# Patient Record
Sex: FEMALE | Race: WHITE | Employment: FULL TIME | URBAN - METROPOLITAN AREA
[De-identification: names, ages, dates, MRNs, and addresses within clinical notes are randomized per-mention and may not be internally consistent; named-entity substitution may affect disease eponyms.]

---

## 2017-02-23 ENCOUNTER — OFFICE VISIT (OUTPATIENT)
Dept: FAMILY MEDICINE | Facility: CLINIC | Age: 22
End: 2017-02-23
Payer: COMMERCIAL

## 2017-02-23 VITALS
BODY MASS INDEX: 34.17 KG/M2 | DIASTOLIC BLOOD PRESSURE: 70 MMHG | TEMPERATURE: 98.9 F | WEIGHT: 181 LBS | HEART RATE: 67 BPM | SYSTOLIC BLOOD PRESSURE: 110 MMHG | HEIGHT: 61 IN | OXYGEN SATURATION: 100 %

## 2017-02-23 DIAGNOSIS — B37.2 YEAST INFECTION OF THE SKIN: ICD-10-CM

## 2017-02-23 DIAGNOSIS — F32.0 MAJOR DEPRESSIVE DISORDER, SINGLE EPISODE, MILD (H): ICD-10-CM

## 2017-02-23 DIAGNOSIS — Z00.00 ROUTINE GENERAL MEDICAL EXAMINATION AT A HEALTH CARE FACILITY: Primary | ICD-10-CM

## 2017-02-23 DIAGNOSIS — E28.2 PCOS (POLYCYSTIC OVARIAN SYNDROME): ICD-10-CM

## 2017-02-23 LAB
ERYTHROCYTE [DISTWIDTH] IN BLOOD BY AUTOMATED COUNT: 12 % (ref 10–15)
HBA1C MFR BLD: 5.1 % (ref 4.3–6)
HCT VFR BLD AUTO: 42 % (ref 35–47)
HGB BLD-MCNC: 13.9 G/DL (ref 11.7–15.7)
MCH RBC QN AUTO: 32 PG (ref 26.5–33)
MCHC RBC AUTO-ENTMCNC: 33.1 G/DL (ref 31.5–36.5)
MCV RBC AUTO: 97 FL (ref 78–100)
PLATELET # BLD AUTO: 293 10E9/L (ref 150–450)
RBC # BLD AUTO: 4.35 10E12/L (ref 3.8–5.2)
WBC # BLD AUTO: 9 10E9/L (ref 4–11)

## 2017-02-23 PROCEDURE — 80053 COMPREHEN METABOLIC PANEL: CPT | Performed by: FAMILY MEDICINE

## 2017-02-23 PROCEDURE — 84443 ASSAY THYROID STIM HORMONE: CPT | Performed by: FAMILY MEDICINE

## 2017-02-23 PROCEDURE — 85027 COMPLETE CBC AUTOMATED: CPT | Performed by: FAMILY MEDICINE

## 2017-02-23 PROCEDURE — 36415 COLL VENOUS BLD VENIPUNCTURE: CPT | Performed by: FAMILY MEDICINE

## 2017-02-23 PROCEDURE — 83036 HEMOGLOBIN GLYCOSYLATED A1C: CPT | Performed by: FAMILY MEDICINE

## 2017-02-23 PROCEDURE — 99395 PREV VISIT EST AGE 18-39: CPT | Performed by: FAMILY MEDICINE

## 2017-02-23 RX ORDER — LEVONORGESTREL AND ETHINYL ESTRADIOL 0.15-0.03
1 KIT ORAL DAILY
Qty: 91 TABLET | Refills: 3 | Status: SHIPPED | OUTPATIENT
Start: 2017-02-23 | End: 2017-05-09

## 2017-02-23 RX ORDER — NYSTATIN 100000 U/G
CREAM TOPICAL 3 TIMES DAILY
Qty: 30 G | Refills: 1 | Status: SHIPPED | OUTPATIENT
Start: 2017-02-23 | End: 2017-03-09

## 2017-02-23 RX ORDER — LEVONORGESTREL AND ETHINYL ESTRADIOL 0.15-0.03
1 KIT ORAL DAILY
Qty: 91 TABLET | Refills: 3 | Status: SHIPPED | OUTPATIENT
Start: 2017-02-23 | End: 2017-02-23

## 2017-02-23 NOTE — NURSING NOTE
"Chief Complaint   Patient presents with     Physical       Initial /70  Pulse 67  Temp 98.9  F (37.2  C) (Oral)  Ht 5' 1\" (1.549 m)  Wt 181 lb (82.1 kg)  LMP 02/18/2017 (Approximate)  SpO2 100%  BMI 34.2 kg/m2 Estimated body mass index is 34.2 kg/(m^2) as calculated from the following:    Height as of this encounter: 5' 1\" (1.549 m).    Weight as of this encounter: 181 lb (82.1 kg).  Medication Reconciliation: complete   Margaret Francisco Medical Assistant      "

## 2017-02-23 NOTE — LETTER
My Depression Action Plan  Name: Rayray Contreras   Date of Birth 1995  Date: 2/23/2017    My doctor: Weiler, Karen   My clinic: [facility] [place]  [address]  [place] [address] [phone]          GREEN    ZONE   Good Control    What it looks like:     Things are going generally well. You have normal up s and down s. You may even feel depressed from time to time, but bad moods usually last less than a day.   What you need to do:  1. Continue to care for yourself (see self care plan)  2. Check your depression survival kit and update it as needed  3. Follow your physician s recommendations including any medication.  4. Do not stop taking medication unless you consult with your physician first.           YELLOW         ZONE Getting Worse    What it looks like:     Depression is starting to interfere with your life.     It may be hard to get out of bed; you may be starting to isolate yourself from others.    Symptoms of depression are starting to last most all day and this has happened for several days.     You may have suicidal thoughts but they are not constant.   What you need to do:     1. Call your care team, your response to treatment will improve if you keep your care team informed of your progress. Yellow periods are signs an adjustment may need to be made.     2. Continue your self-care, even if you have to fake it!    3. Talk to someone in your support network    4. Open up your depression survival kit           RED    ZONE Medical Alert - Get Help    What it looks like:     Depression is seriously interfering with your life.     You may experience these or other symptoms: You can t get out of bed most days, can t work or engage in other necessary activities, you have trouble taking care of basic hygiene, or basic responsibilities, thoughts of suicide or death that will not go away, self-injurious behavior.     What you need to do:  1. Call your care team and request a  same-day appointment. If they are not available (weekends or after hours) call your local crisis line, emergency room or 911.      Electronically signed by: Margaret Francisco, February 23, 2017    Depression Self Care Plan / Survival Kit    Self-Care for Depression  Here s the deal. Your body and mind are really not as separate as most people think.  What you do and think affects how you feel and how you feel influences what you do and think. This means if you do things that people who feel good do, it will help you feel better.  Sometimes this is all it takes.  There is also a place for medication and therapy depending on how severe your depression is, so be sure to consult with your medical provider and/ or Behavioral Health Consultant if your symptoms are worsening or not improving.     In order to better manage my stress, I will:    Exercise  Get some form of exercise, every day. This will help reduce pain and release endorphins, the  feel good  chemicals in your brain. This is almost as good as taking antidepressants!  This is not the same as joining a gym and then never going! (they count on that by the way ) It can be as simple as just going for a walk or doing some gardening, anything that will get you moving.      Hygiene   Maintain good hygiene (Get out of bed in the morning, Make your bed, Brush your teeth, Take a shower, and Get dressed like you were going to work, even if you are unemployed).  If your clothes don't fit try to get ones that do.    Diet  I will strive to eat foods that are good for me, drink plenty of water, and avoid excessive sugar, caffeine, alcohol, and other mood-altering substances.  Some foods that are helpful in depression are: complex carbohydrates, B vitamins, flaxseed, fish or fish oil, fresh fruits and vegetables.    Psychotherapy  I agree to participate in Individual Therapy (if recommended).    Medication  If prescribed medications, I agree to take them.  Missing doses can result  in serious side effects.  I understand that drinking alcohol, or other illicit drug use, may cause potential side effects.  I will not stop my medication abruptly without first discussing it with my provider.    Staying Connected With Others  I will stay in touch with my friends, family members, and my primary care provider/team.    Use your imagination  Be creative.  We all have a creative side; it doesn t matter if it s oil painting, sand castles, or mud pies! This will also kick up the endorphins.    Witness Beauty  (AKA stop and smell the roses) Take a look outside, even in mid-winter. Notice colors, textures. Watch the squirrels and birds.     Service to others  Be of service to others.  There is always someone else in need.  By helping others we can  get out of ourselves  and remember the really important things.  This also provides opportunities for practicing all the other parts of the program.    Humor  Laugh and be silly!  Adjust your TV habits for less news and crime-drama and more comedy.    Control your stress  Try breathing deep, massage therapy, biofeedback, and meditation. Find time to relax each day.     My support system    Clinic Contact:  Phone number:    Contact 1:  Phone number:    Contact 2:  Phone number:    Druze/:  Phone number:    Therapist:  Phone number:    Local crisis center:    Phone number:    Other community support:  Phone number:

## 2017-02-23 NOTE — MR AVS SNAPSHOT
After Visit Summary   2/23/2017    Rayray Contreras    MRN: [id number]           Patient Information     Date Of Birth          1995        Visit Information        Provider Department      2/23/2017 2:20 PM Weiler, Karen, MD [facility] [place]        Today's Diagnoses     Routine general medical examination at a health care facility    -  1    PCOS (polycystic ovarian syndrome)        Major depressive disorder, single episode, mild (H)        Yeast infection of the skin           Follow-ups after your visit        Who to contact     If you have questions or need follow up information about today's clinic visit or your schedule please contact [facility] [place] directly at [phone].  Normal or non-critical lab and imaging results will be communicated to you by MyChart, letter or phone within 4 business days after the clinic has received the results. If you do not hear from us within 7 days, please contact the clinic through [email or web address] or phone. If you have a critical or abnormal lab result, we will notify you by phone as soon as possible.  Submit refill requests through [employer] or call your pharmacy and they will forward the refill request to us. Please allow 3 business days for your refill to be completed.          Additional Information About Your Visit        MyChart Information     [employer] gives you secure access to your electronic health record. If you see a primary care provider, you can also send messages to your care team and make appointments. If you have questions, please call your primary care clinic.  If you do not have a primary care provider, please call [phone] and they will assist you.        Care EveryWhere ID     This is your Care EveryWhere ID. This could be used by other organizations to access your [place] medical records  [id number]        Your Vitals Were     Pulse Temperature Height Last Period Pulse Oximetry BMI (Body Mass Index)    67 98.9  F (37.2  C)  "(Oral) 5' 1\" (1.549 m) 02/18/2017 (Approximate) 100% 34.2 kg/m2       Blood Pressure from Last 3 Encounters:   02/23/17 110/70   12/29/16 129/86   08/15/16 112/79    Weight from Last 3 Encounters:   02/23/17 181 lb (82.1 kg)   12/29/16 180 lb (81.6 kg)   08/15/16 184 lb (83.5 kg)              We Performed the Following     CBC with platelets     Comprehensive metabolic panel (BMP + Alb, Alk Phos, ALT, AST, Total. Bili, TP)     DEPRESSION ACTION PLAN (DAP)     Hemoglobin A1c     TSH with free T4 reflex          Today's Medication Changes          These changes are accurate as of: 2/23/17 11:59 PM.  If you have any questions, ask your nurse or doctor.               Start taking these medicines.        Dose/Directions    levonorgestrel-ethinyl estradiol 0.15-0.03 MG per tablet   Commonly known as:  SEASONALE   Used for:  PCOS (polycystic ovarian syndrome)   Started by:  Weiler, Karen, MD        Dose:  1 tablet   Take 1 tablet by mouth daily Must make appointment for future refills.   Quantity:  91 tablet   Refills:  3       nystatin cream   Commonly known as:  MYCOSTATIN   Used for:  Yeast infection of the skin   Started by:  Weiler, Karen, MD        Apply topically 3 times daily for 14 days   Quantity:  30 g   Refills:  1            Where to get your medicines      [facility] [address] [place] [address] AT [facility] [address] [address], [facility] [address]    Hours:  24-hours Phone:  [phone]     levonorgestrel-ethinyl estradiol 0.15-0.03 MG per tablet    nystatin cream                Primary Care Provider Office Phone # Fax #    Karen Weiler, [phone]       [facility] [address]  [place] [address]        Thank you!     Thank you for choosing [facility]  for your care. Our goal is always to provide you with excellent care. Hearing back from our patients is one way we can continue to " improve our services. Please take a few minutes to complete the written survey that you may receive in the mail after your visit with us. Thank you!             Your Updated Medication List - Protect others around you: Learn how to safely use, store and throw away your medicines at www.disposemymeds.org.          This list is accurate as of: 2/23/17 11:59 PM.  Always use your most recent med list.                   Brand Name Dispense Instructions for use    levonorgestrel-ethinyl estradiol 0.15-0.03 MG per tablet    SEASONALE    91 tablet    Take 1 tablet by mouth daily Must make appointment for future refills.       nystatin cream    MYCOSTATIN    30 g    Apply topically 3 times daily for 14 days

## 2017-02-23 NOTE — PROGRESS NOTES
SUBJECTIVE:     CC: Rayray Contreras is an 21 year old woman who presents for preventive health visit.     Physical   Annual:     Getting at least 3 servings of Calcium per day::  NO    Bi-annual eye exam::  Yes    Dental care twice a year::  NO    Sleep apnea or symptoms of sleep apnea::  None    Diet::  Regular (no restrictions)    Frequency of exercise::  None    Taking medications regularly::  Yes    Medication side effects::  None    Additional concerns today::  No      Would like to potentially switch her birth control.  Feels like it is causing some hair growth. Periods have been regular. Periods are not heavy.  No cramping. Has never been sexually active.  Has a boyfriend. Anticipate that she may become sexually active.    Feels like her depression is good. Has not taken Wellbutrin in a while. No SI/HI. Is trying to journal.  Is living at home.     No CP, or SOB.       Today's PHQ-2 Score: Answers for HPI/ROS submitted by the patient on 2/20/2017   PHQ-2 Depressed: Not at all, Not at all  PHQ-2 Score: 0    PHQ-2 ( 1999 Pfizer) 2/20/2017   Q1: Little interest or pleasure in doing things -   Q2: Feeling down, depressed or hopeless -   PHQ-2 Score -   Little interest or pleasure in doing things Not at all   Feeling down, depressed or hopeless Not at all   PHQ-2 Score 0       Abuse: Current or Past(Physical, Sexual or Emotional)- No  Do you feel safe in your environment - Yes    Social History   Substance Use Topics     Smoking status: Never Smoker     Smokeless tobacco: Never Used     Alcohol use No      Comment: once every few months      1 per month     Recent Labs   Lab Test  12/18/14   1148  01/15/11   1030   CHOL  146  172   HDL  55  49*   LDL  80  88   TRIG  53  178*   CHOLHDLRATIO  2.7  3.6       Reviewed orders with patient.  Reviewed health maintenance and updated orders accordingly - Yes    Mammo Decision Support:  Mammogram not appropriate for this patient based on age.    Pertinent mammograms  "are reviewed under the imaging tab.  History of abnormal Pap smear: NO - age 21-29 PAP every 3 years recommended  All Histories reviewed and updated in Epic.  ROS:  C: NEGATIVE for fever, chills, change in weight  I: NEGATIVE for worrisome rashes, moles or lesions  E: NEGATIVE for vision changes or irritation  ENT: NEGATIVE for ear, mouth and throat problems  R: NEGATIVE for significant cough or SOB  B: NEGATIVE for masses, tenderness or discharge  CV: NEGATIVE for chest pain, palpitations or peripheral edema  GI: NEGATIVE for nausea, abdominal pain, heartburn, or change in bowel habits  : NEGATIVE for unusual urinary or vaginal symptoms. Periods are regular.  M: NEGATIVE for significant arthralgias or myalgia  N: NEGATIVE for weakness, dizziness or paresthesias  P: NEGATIVE for changes in mood or affect    Problem list, Medication list, Allergies, and Medical/Social/Surgical histories reviewed in [facility] and updated as appropriate.  OBJECTIVE:     /70  Pulse 67  Temp 98.9  F (37.2  C) (Oral)  Ht 5' 1\" (1.549 m)  Wt 181 lb (82.1 kg)  LMP 02/18/2017 (Approximate)  SpO2 100%  BMI 34.2 kg/m2  EXAM:  GENERAL: healthy, alert and no distress  NECK: no adenopathy, no asymmetry, masses, or scars and thyroid normal to palpation  RESP: lungs clear to auscultation - no rales, rhonchi or wheezes  BREAST: normal without masses, tenderness or nipple discharge and no palpable axillary masses or adenopathy  CV: regular rate and rhythm, normal S1 S2, no S3 or S4, no murmur, click or rub, no peripheral edema and peripheral pulses strong  ABDOMEN: soft, nontender, no hepatosplenomegaly, no masses and bowel sounds normal  MS: no gross musculoskeletal defects noted, no edema    ASSESSMENT/PLAN:         ICD-10-CM    1. Routine general medical examination at a health care facility Z00.00 Doing well   2. PCOS (polycystic ovarian syndrome) E28.2 TSH with free T4 reflex   On OCP's,  Asymptomatic. Will check labs.   " "levonorgestrel-ethinyl estradiol (SEASONALE) 0.15-0.03 MG per tablet     CBC with platelets     Comprehensive metabolic panel (BMP + Alb, Alk Phos, ALT, AST, Total. Bili, TP)     Hemoglobin A1c     DISCONTINUED: levonorgestrel-ethinyl estradiol (SEASONALE) 0.15-0.03 MG per tablet   3. Major depressive disorder, single episode, mild (H) F32.0 Doing well.  Patient does not feel like she needs to be on medication at this time.    4. Yeast infection of the skin B37.2 nystatin (MYCOSTATIN) cream       COUNSELING:  Reviewed preventive health counseling, as reflected in patient instructions       Regular exercise       Healthy diet/nutrition         reports that she has never smoked. She has never used smokeless tobacco.    Estimated body mass index is 34.2 kg/(m^2) as calculated from the following:    Height as of this encounter: 5' 1\" (1.549 m).    Weight as of this encounter: 181 lb (82.1 kg).   Weight management plan: Discussed healthy diet and exercise guidelines and patient will follow up in 12 months in clinic to re-evaluate.    Counseling Resources:  ATP IV Guidelines  Pooled Cohorts Equation Calculator  Breast Cancer Risk Calculator  FRAX Risk Assessment  ICSI Preventive Guidelines  Dietary Guidelines for Americans, 2010  USDA's MyPlate  ASA Prophylaxis  Lung CA Screening    Karen Weiler, MD  [facility]  "

## 2017-02-24 LAB
ALBUMIN SERPL-MCNC: 4.3 G/DL (ref 3.4–5)
ALP SERPL-CCNC: 45 U/L (ref 40–150)
ALT SERPL W P-5'-P-CCNC: 15 U/L (ref 0–50)
ANION GAP SERPL CALCULATED.3IONS-SCNC: 5 MMOL/L (ref 3–14)
AST SERPL W P-5'-P-CCNC: 7 U/L (ref 0–45)
BILIRUB SERPL-MCNC: 0.4 MG/DL (ref 0.2–1.3)
BUN SERPL-MCNC: 12 MG/DL (ref 7–30)
CALCIUM SERPL-MCNC: 9.5 MG/DL (ref 8.5–10.1)
CHLORIDE SERPL-SCNC: 106 MMOL/L (ref 94–109)
CO2 SERPL-SCNC: 29 MMOL/L (ref 20–32)
CREAT SERPL-MCNC: 0.82 MG/DL (ref 0.52–1.04)
GFR SERPL CREATININE-BSD FRML MDRD: 86 ML/MIN/1.7M2
GLUCOSE SERPL-MCNC: 60 MG/DL (ref 70–99)
POTASSIUM SERPL-SCNC: 3.9 MMOL/L (ref 3.4–5.3)
PROT SERPL-MCNC: 8.5 G/DL (ref 6.8–8.8)
SODIUM SERPL-SCNC: 140 MMOL/L (ref 133–144)
TSH SERPL DL<=0.005 MIU/L-ACNC: 0.68 MU/L (ref 0.4–4)

## 2017-04-06 ENCOUNTER — TELEPHONE (OUTPATIENT)
Dept: FAMILY MEDICINE | Facility: CLINIC | Age: 22
End: 2017-04-06

## 2017-05-09 DIAGNOSIS — E28.2 PCOS (POLYCYSTIC OVARIAN SYNDROME): ICD-10-CM

## 2017-05-09 RX ORDER — LEVONORGESTREL AND ETHINYL ESTRADIOL 0.15-0.03
1 KIT ORAL DAILY
Qty: 91 TABLET | Refills: 2 | Status: SHIPPED | OUTPATIENT
Start: 2017-05-09 | End: 2018-03-14

## 2017-05-09 NOTE — TELEPHONE ENCOUNTER
Prescription approved per [facility] Refill Protocol.  Sophie Lugo, RN, BSN  [facility]

## 2017-05-09 NOTE — TELEPHONE ENCOUNTER
Name of caller: Rayray  Relationship to Patient: self    Reason for Call:  Med refill-BC medication    Best phone number to reach pt at is: [phone]  Ok to leave a message with medical info? yes    Pharmacy preferred (if calling for a refill): [facility] [place]

## 2017-05-09 NOTE — TELEPHONE ENCOUNTER
levonorgestrel-ethinyl estradiol (SEASONALE) 0.15-0.03 MG per tablet  New Pharmacy  Last Written Prescription Date: 2/23/2017  Last Fill Quantity: 91 tablet,  # refills: 3   Last Office Visit with FMCLAUDIA, ROSAP or [facility] prescribing provider: 2/23/2017

## 2017-06-26 ENCOUNTER — OFFICE VISIT (OUTPATIENT)
Dept: FAMILY MEDICINE | Facility: CLINIC | Age: 22
End: 2017-06-26
Payer: COMMERCIAL

## 2017-06-26 VITALS
OXYGEN SATURATION: 100 % | HEIGHT: 61 IN | SYSTOLIC BLOOD PRESSURE: 130 MMHG | HEART RATE: 78 BPM | DIASTOLIC BLOOD PRESSURE: 70 MMHG | WEIGHT: 180.7 LBS | TEMPERATURE: 98.1 F | BODY MASS INDEX: 34.11 KG/M2

## 2017-06-26 DIAGNOSIS — M79.641 PAIN OF RIGHT HAND: ICD-10-CM

## 2017-06-26 DIAGNOSIS — R20.0 NUMBNESS OF RIGHT HAND: Primary | ICD-10-CM

## 2017-06-26 PROCEDURE — 99213 OFFICE O/P EST LOW 20 MIN: CPT | Performed by: PHYSICIAN ASSISTANT

## 2017-06-26 NOTE — MR AVS SNAPSHOT
After Visit Summary   6/26/2017    Rayray Contreras    MRN: [id number]           Patient Information     Date Of Birth          1995        Visit Information        Provider Department      6/26/2017 1:40 PM Melanie Parra PA-C [facility]        Today's Diagnoses     Numbness of right hand    -  1      Care Instructions      AOLIKES Wrist Sprains Support Hand Brace Carpal Tunnel Steel Splint-Arthritis,Right Hand color  Available at [facility] also has braces  Carpal Tunnel Syndrome    Carpal tunnel syndrome is a painful condition of the wrist and arm. It is caused by pressure on the median nerve.  The median nerve is one of the nerves that give feeling and movement to the hand. It passes through a tunnel in the wrist called the carpal tunnel. This tunnel is made up of bones and ligaments. Narrowing of this tunnel or swelling of the tissues inside the tunnel puts pressure on the median nerve. This causes numbness, pins and needles, or electric shooting pains in your hand and forearm. Often the pain is worse at night and may wake you when you are asleep.  Carpal tunnel syndrome may occur during pregnancy and with use of birth control pills. It is more common in workers who must often bend their wrists. It is also common in people who work with power tools that cause strong vibrations.  Home care    Rest the painful wrist. Avoid repeated bending of the wrist back and forth. This puts pressure on the median nerve. Avoid using power tools with strong vibrations.    If you were given a splint, wear it at night while you sleep. You may also wear it during the day for comfort.    Move your fingers and wrists often to avoid stiffness.    Elevate your arms on pillows when you lie down.    Try using the unaffected hand more.    Try not to hold your wrists in a bent, downward position.    Sometimes changes in the work place may ease symptoms. If you type most of the day, it may help to  change the position of your keyboard or add a wrist support. Your wrist should be in a neutral position and not bent back when typing.    You may use over-the-counter pain medicine to treat pain and inflammation, unless another medicine was prescribed. Anti-inflammatory pain medicines, such as ibuprofen or naproxen may be more effective than acetaminophen, which treats pain, but not inflammation. If you have chronic liver or kidney disease or ever had a stomach ulcer or GI bleeding, talk with your doctor before using these medicines.    Opioid pain medicine will only give temporary relief and does not treat the problem. If pain continues, you may need a shot of a steroid drug into your wrist.    If the above methods fail, you may need surgery. This will open the carpal tunnel and release the pressure on the trapped nerve.  Follow-up care  Follow up with your healthcare provider, or as advised, if the pain doesn t begin to improve within the next week.  If X-rays were taken, you will be notified of any new findings that may affect your care.  When to seek medical advice  Call your healthcare provider right away if any of these occur:    Pain not improving with the above treatment    Fingers or hand become cold, blue, numb, or tingly    Your whole arm becomes swollen or weak  Date Last Reviewed: 11/23/2015 2000-2017 [employer]. [address] [place] [address], [place] [id number]. All rights reserved. This information is not intended as a substitute for professional medical care. Always follow your healthcare professional's instructions.                Follow-ups after your visit        Additional Services     ORTHO  REFERRAL       [facility] is referring you to the Orthopedic  Services at [facility].       The  Representative will assist you in the coordination of your Orthopedic and Musculoskeletal Care as prescribed by your physician.    The  [facility] Representative will call you within 1 business day to help schedule your appointment, or you may contact the  Representative at:    All areas ~ [phone]     Type of Referral : Non Surgical       Timeframe requested: Within 2 weeks    MAY NEED EMG/NERVE CONDUCTION STUDY TO ASSESS FOR CARPAL TUNNEL    Coverage of these services is subject to the terms and limitations of your health insurance plan.  Please call member services at your health plan with any benefit or coverage questions.      If X-rays, CT or MRI's have been performed, please contact the facility where they were done to arrange for , prior to your scheduled appointment.  Please bring this referral request to your appointment and present it to your specialist.                  Who to contact     If you have questions or need follow up information about today's clinic visit or your schedule please contact [facility] directly at [phone].  Normal or non-critical lab and imaging results will be communicated to you by MyChart, letter or phone within 4 business days after the clinic has received the results. If you do not hear from us within 7 days, please contact the clinic through [employer] or phone. If you have a critical or abnormal lab result, we will notify you by phone as soon as possible.  Submit refill requests through [employer] or call your pharmacy and they will forward the refill request to us. Please allow 3 business days for your refill to be completed.          Additional Information About Your Visit        [employer] Information     [employer] gives you secure access to your electronic health record. If you see a primary care provider, you can also send messages to your care team and make appointments. If you have questions, please call your primary care clinic.  If you do not have a primary care provider, please call [phone] and they will assist you.        Care EveryWhere ID     This is your Care  "EveryWhere ID. This could be used by other organizations to access your [facility] medical records  [id number]        Your Vitals Were     Pulse Temperature Height Last Period Pulse Oximetry Breastfeeding?    78 98.1  F (36.7  C) (Tympanic) 5' 1\" (1.549 m) 05/21/2017 100% No    BMI (Body Mass Index)                   34.14 kg/m2            Blood Pressure from Last 3 Encounters:   06/26/17 130/70   02/23/17 110/70   12/29/16 129/86    Weight from Last 3 Encounters:   06/26/17 180 lb 11.2 oz (82 kg)   02/23/17 181 lb (82.1 kg)   12/29/16 180 lb (81.6 kg)              We Performed the Following     ORTHO  REFERRAL        Primary Care Provider Office Phone # Fax #    Karen Weiler, [phone]       [facility] [address] [facility] [address]        Equal Access to Services     GONZALO BAUTISTA : Hadii aad ku hadasho Soomaali, waaxda luqadaha, qaybta kaalmada adeegyada, waxay idiin hayasiyan bessy [place] . So [facility] [phone].    ATENCIÓN: Si habla español, tiene a acosta disposición servicios gratuitos de asistencia lingüística. Llame al [phone].    We comply with applicable federal civil rights laws and [place] laws. We do not discriminate on the basis of race, color, national origin, age, disability sex, sexual orientation or gender identity.            Thank you!     Thank you for choosing [facility]  for your care. Our goal is always to provide you with excellent care. Hearing back from our patients is one way we can continue to improve our services. Please take a few minutes to complete the written survey that you may receive in the mail after your visit with us. Thank you!             Your Updated Medication List - Protect others around you: Learn how to safely use, store and throw away your medicines at www.disposemymeds.org.          This list is accurate as of: 6/26/17  2:13 PM.  Always use your most recent med list.                   Brand Name " Dispense Instructions for use Diagnosis    levonorgestrel-ethinyl estradiol 0.15-0.03 MG per tablet    SEASONALE    91 tablet    Take 1 tablet by mouth daily Must make appointment for future refills.    PCOS (polycystic ovarian syndrome)

## 2017-06-26 NOTE — PATIENT INSTRUCTIONS
GOLDY Wrist Sprains Support Hand Brace Carpal Tunnel Steel Splint-Arthritis,Right Hand color  Available at [facility] also has braces  Carpal Tunnel Syndrome    Carpal tunnel syndrome is a painful condition of the wrist and arm. It is caused by pressure on the median nerve.  The median nerve is one of the nerves that give feeling and movement to the hand. It passes through a tunnel in the wrist called the carpal tunnel. This tunnel is made up of bones and ligaments. Narrowing of this tunnel or swelling of the tissues inside the tunnel puts pressure on the median nerve. This causes numbness, pins and needles, or electric shooting pains in your hand and forearm. Often the pain is worse at night and may wake you when you are asleep.  Carpal tunnel syndrome may occur during pregnancy and with use of birth control pills. It is more common in workers who must often bend their wrists. It is also common in people who work with power tools that cause strong vibrations.  Home care    Rest the painful wrist. Avoid repeated bending of the wrist back and forth. This puts pressure on the median nerve. Avoid using power tools with strong vibrations.    If you were given a splint, wear it at night while you sleep. You may also wear it during the day for comfort.    Move your fingers and wrists often to avoid stiffness.    Elevate your arms on pillows when you lie down.    Try using the unaffected hand more.    Try not to hold your wrists in a bent, downward position.    Sometimes changes in the work place may ease symptoms. If you type most of the day, it may help to change the position of your keyboard or add a wrist support. Your wrist should be in a neutral position and not bent back when typing.    You may use over-the-counter pain medicine to treat pain and inflammation, unless another medicine was prescribed. Anti-inflammatory pain medicines, such as ibuprofen or naproxen may be more effective than acetaminophen,  which treats pain, but not inflammation. If you have chronic liver or kidney disease or ever had a stomach ulcer or GI bleeding, talk with your doctor before using these medicines.    Opioid pain medicine will only give temporary relief and does not treat the problem. If pain continues, you may need a shot of a steroid drug into your wrist.    If the above methods fail, you may need surgery. This will open the carpal tunnel and release the pressure on the trapped nerve.  Follow-up care  Follow up with your healthcare provider, or as advised, if the pain doesn t begin to improve within the next week.  If X-rays were taken, you will be notified of any new findings that may affect your care.  When to seek medical advice  Call your healthcare provider right away if any of these occur:    Pain not improving with the above treatment    Fingers or hand become cold, blue, numb, or tingly    Your whole arm becomes swollen or weak  Date Last Reviewed: 11/23/2015 2000-2017 [employer]. [address] [place] [address], [place] [address]. All rights reserved. This information is not intended as a substitute for professional medical care. Always follow your healthcare professional's instructions.

## 2017-06-26 NOTE — PROGRESS NOTES
SUBJECTIVE:                                                    Rayray Contreras is a 22 year old female who presents to clinic today for the following health issues:    Joint Pain    Onset: Since 2009     Description:   Location: Right hand   Character: Sharp    Intensity: 8/10    Progression of Symptoms: same    Accompanying Signs & Symptoms:  Other symptoms: numbness    History:   Previous similar pain: YES      Precipitating factors:   Trauma or overuse: YES    Alleviating factors:  Improved by: nothing    Therapies Tried and outcome: Nothing     Patient reports a history of these symptoms since 2009. States symptoms used to be brought on by knitting and by playing video games  Thinks that symptoms may be getting worse  Working as a  now. Notices numbness in her R hand when she uses clippers (vibration).  Reports pain on palmar aspect of her hand, near thumb and index finger  States that pain is tolerable and does not interfere with her work. Thumb movements exacerbate the pain.  Numbness has made it difficult for her to work. Notices numbness in thenar eminence, but also indicates that digits 4 and 5 sometimes feel numb  Denies elbow pain  States that the pain is more frequent than the numbness  Doesn't feel that  strength has decreased    She is R handed    Denies history of neck problems  Denies any history of fractures or injuries of the hand/wirst.    Got a new clippers with a handle so she won't drop it.    Problem list and histories reviewed & adjusted, as indicated.  Additional history: as documented    Patient Active Problem List   Diagnosis     Developmental Delay     Muscle cramp     Obesity     PCOS (polycystic ovarian syndrome)     Major depressive disorder, single episode, mild (H)     Health Care Home     Concussion without loss of consciousness     Past Surgical History:   Procedure Laterality Date     NO HISTORY OF SURGERY         Social History   Substance Use Topics      "Smoking status: Never Smoker     Smokeless tobacco: Never Used     Alcohol use No      Comment: once every few months      Family History   Problem Relation Age of Onset     Colon Cancer Father      DIABETES Maternal Grandmother      DIABETES Maternal Grandfather          Current Outpatient Prescriptions   Medication Sig Dispense Refill     levonorgestrel-ethinyl estradiol (SEASONALE) 0.15-0.03 MG per tablet Take 1 tablet by mouth daily Must make appointment for future refills. 91 tablet 2     Allergies   Allergen Reactions     Chicken-Derived Products (Egg)      Other reaction(s): GI Upset     Lac Bovis      Other reaction(s): GI Upset       Reviewed and updated as needed this visit by clinical staff       Reviewed and updated as needed this visit by Provider         ROS:  CONSTITUTIONAL:NEGATIVE  for chills and fever  INTEGUMENTARY/SKIN: NEGATIVE for worrisome rashes, moles or lesions  MUSCULOSKELETAL: POSITIVE  for hand pain  NEURO: POSITIVE for numbness or tingling    OBJECTIVE:     /70 (BP Location: Right arm, Patient Position: Chair, Cuff Size: Adult Large)  Pulse 78  Temp 98.1  F (36.7  C) (Tympanic)  Ht 5' 1\" (1.549 m)  Wt 180 lb 11.2 oz (82 kg)  LMP 05/21/2017  SpO2 100%  Breastfeeding? No  BMI 34.14 kg/m2  Body mass index is 34.14 kg/(m^2).  GENERAL: healthy, alert and no distress  MS: No pain with wrist flexion/extension. Pain in wrist with abduction/adduction. Carpal tunnel compression test: reports numbness in index finger. Phalen's test: reports numbness in palm of hand and digits 2-4  SKIN: no suspicious lesions or rashes    Diagnostic Test Results:  none     ASSESSMENT/PLAN:     1. Numbness of right hand  Possible carpal tunnel syndrome. Reports numbness in digits 2-4, but states she also sometimes gets numbness in 5th digit. May benefit from EMG/NCS for further evaluation. Recommend wearing wrist brace at night while sleeping, as well as during the day as needed. Discussed that " physical therapy may also be advised to manage symptoms. Will refer to ortho to determine if EMG/NCS is indicated at this time; start using brace in the meantime  - ORTHO  REFERRAL    2. Pain of right hand  Does endorse pain in the thenar eminence. With concomitant numbness, suspect carpal tunnel syndrome. See above.    See Patient Instructions    Melanie Parra PA-C  [facility]

## 2017-06-26 NOTE — NURSING NOTE
"Chief Complaint   Patient presents with     Musculoskeletal Problem       Initial /70 (BP Location: Right arm, Patient Position: Chair, Cuff Size: Adult Large)  Pulse 78  Temp 98.1  F (36.7  C) (Tympanic)  Ht 5' 1\" (1.549 m)  Wt 180 lb 11.2 oz (82 kg)  LMP 05/21/2017  SpO2 100%  Breastfeeding? No  BMI 34.14 kg/m2 Estimated body mass index is 34.14 kg/(m^2) as calculated from the following:    Height as of this encounter: 5' 1\" (1.549 m).    Weight as of this encounter: 180 lb 11.2 oz (82 kg).  Medication Reconciliation: complete     Belen Davies MA     "

## 2017-07-07 ENCOUNTER — OFFICE VISIT (OUTPATIENT)
Dept: ORTHOPEDICS | Facility: CLINIC | Age: 22
End: 2017-07-07
Payer: COMMERCIAL

## 2017-07-07 VITALS
HEIGHT: 62 IN | WEIGHT: 182 LBS | SYSTOLIC BLOOD PRESSURE: 116 MMHG | BODY MASS INDEX: 33.49 KG/M2 | DIASTOLIC BLOOD PRESSURE: 84 MMHG

## 2017-07-07 DIAGNOSIS — R20.0 NUMBNESS OF RIGHT HAND: Primary | ICD-10-CM

## 2017-07-07 PROCEDURE — 99244 OFF/OP CNSLTJ NEW/EST MOD 40: CPT | Performed by: FAMILY MEDICINE

## 2017-07-07 RX ORDER — PREDNISONE 20 MG/1
20 TABLET ORAL DAILY
Qty: 5 TABLET | Refills: 0 | Status: SHIPPED | OUTPATIENT
Start: 2017-07-07 | End: 2017-12-21

## 2017-07-07 NOTE — MR AVS SNAPSHOT
After Visit Summary   7/7/2017    Rayray Contreras    MRN: [id number]           Patient Information     Date Of Birth          1995        Visit Information        Provider Department      7/7/2017 2:00 PM Madelyn Ledezma DO [facility]        Today's Diagnoses     Numbness of right hand    -  1      Care Instructions    Thank you for allowing us to participate in your care today.  Please find below your visit diagnosis and the plan going forward.    1. Numbness of right hand      Hand therapy: [facility] - [phone]  EMG ordered  Prescription Medication as directed: Prednisone. Take it in the AM and with food.  Ok to wear the brace at night / after work as needed    Follow up 5 days after the date of your EMG to discuss results and next steps. Call direct clinic number [[phone]] at any time with questions or concerns.    Madelyn Ledezma DO [facility]  Website: [email or web address]  Twitter: [email or web address]            Follow-ups after your visit        Additional Services     [facility] PT, HAND, AND CHIROPRACTIC REFERRAL       **This order will print in the [facility] Scheduling Office**    Physical Therapy, Hand Therapy and Chiropractic Care are available through:    *[facility]  *[facility]  *[facility]    Call one number to schedule at any of the above locations: [phone].    Your provider has referred you to: Hand Therapy    Indication/Reason for Referral: Carpal Tunnel  Onset of Illness: see chart  Therapy Orders: Evaluate and Treat  Special Programs: None  Special Request: None    Additional Comments for the Therapist or Chiropractor:     Please be aware that coverage of these services is subject to the terms and limitations of your health insurance plan.  Call member services at your health plan with any benefit or coverage questions.      Please  bring the following to your appointment:    *Your personal calendar for scheduling future appointments  *Comfortable clothing            NEUROLOGY ADULT REFERRAL       Your provider has referred you to:  Dr. Onesimo Loyola  [facility]  [address] [id number] [phone]  Toll Free: [phone]  Fax: [phone]    EMG Procedure/Referral: EMG Procedure Only, access for right median/ulnar neuropathy     Please be aware that coverage of these services is subject to the terms and limitations of your health insurance plan.  Call member services at your health plan with any benefit or coverage questions.      Please bring the following with you to your appointment:    (1) Any X-Rays, CTs or MRIs which have been performed.  Contact the facility where they were done to arrange for  prior to your scheduled appointment.  Any new CT, MRI or other procedures ordered by your specialist must be performed at a [facility] facility or coordinated by your clinic's referral office.  (2) List of current medications  (3) This referral request   (4) Any documents/labs given to you for this referral                  Who to contact     If you have questions or need follow up information about today's clinic visit or your schedule please contact [facility] directly at [phone].  Normal or non-critical lab and imaging results will be communicated to you by MyChart, letter or phone within 4 business days after the clinic has received the results. If you do not hear from us within 7 days, please contact the clinic through MyChart or phone. If you have a critical or abnormal lab result, we will notify you by phone as soon as possible.  Submit refill requests through [employer] or call your pharmacy and they will forward the refill request to us. Please allow 3 business days for your refill to be completed.          Additional Information About Your Visit       "  MyChart Information     [employer] gives you secure access to your electronic health record. If you see a primary care provider, you can also send messages to your care team and make appointments. If you have questions, please call your primary care clinic.  If you do not have a primary care provider, please call [phone] and they will assist you.        Care EveryWhere ID     This is your Care EveryWhere ID. This could be used by other organizations to access your [facility] medical records  [id number]        Your Vitals Were     Height Last Period BMI (Body Mass Index)             5' 1.5\" (1.562 m) 05/21/2017 33.83 kg/m2          Blood Pressure from Last 3 Encounters:   07/07/17 116/84   06/26/17 130/70   02/23/17 110/70    Weight from Last 3 Encounters:   07/07/17 182 lb (82.6 kg)   06/26/17 180 lb 11.2 oz (82 kg)   02/23/17 181 lb (82.1 kg)              We Performed the Following     [facility] PT, HAND, AND CHIROPRACTIC REFERRAL     NEUROLOGY ADULT REFERRAL          Today's Medication Changes          These changes are accurate as of: 7/7/17  2:33 PM.  If you have any questions, ask your nurse or doctor.               Start taking these medicines.        Dose/Directions    predniSONE 20 MG tablet   Commonly known as:  DELTASONE   Used for:  Numbness of right hand   Started by:  Madelyn Ledezma DO        Dose:  20 mg   Take 1 tablet (20 mg) by mouth daily   Quantity:  5 tablet   Refills:  0            Where to get your medicines      These medications were sent to [facility] [address] [facility] [employer] [facility] [address] [facility] [address]  [address] [facility] [address], [place] [address]     Phone:  [phone]     predniSONE 20 MG tablet                Primary Care Provider Office Phone # Fax #    Karen Weiler, [phone]       [facility] [address] [facility] [address]        Equal Access to Services     GONZALO BAUTISTA AH: Hadii uzair gosso Soomaali, waaxda luqadaha, qaybta kaalmada camilo burris " xiomara rodriguezcollin laNashlucía ah. So [facility] [phone].    ATENCIÓN: Si habla carlos, tiene a acosta disposición servicios gratuitos de asistencia lingüística. Flavia al [phone].    We comply with applicable federal civil rights laws and [place] laws. We do not discriminate on the basis of race, color, national origin, age, disability sex, sexual orientation or gender identity.            Thank you!     Thank you for choosing [facility]  for your care. Our goal is always to provide you with excellent care. Hearing back from our patients is one way we can continue to improve our services. Please take a few minutes to complete the written survey that you may receive in the mail after your visit with us. Thank you!             Your Updated Medication List - Protect others around you: Learn how to safely use, store and throw away your medicines at www.disposemymeds.org.          This list is accurate as of: 7/7/17  2:33 PM.  Always use your most recent med list.                   Brand Name Dispense Instructions for use Diagnosis    levonorgestrel-ethinyl estradiol 0.15-0.03 MG per tablet    SEASONALE    91 tablet    Take 1 tablet by mouth daily Must make appointment for future refills.    PCOS (polycystic ovarian syndrome)       predniSONE 20 MG tablet    DELTASONE    5 tablet    Take 1 tablet (20 mg) by mouth daily    Numbness of right hand

## 2017-07-07 NOTE — LETTER
7/7/2017         RE: Rayray Contreras  [address] [facility] [address] [place] [id number]        Dear Colleague,    Thank you for referring your patient, Rayray Contreras, to the [facility]. Please see a copy of my visit note below.    ASSESSMENT & PLAN    ICD-10-CM    1. Numbness of right hand R20.0 NEUROLOGY ADULT REFERRAL     [facility], HAND, AND CHIROPRACTIC REFERRAL     predniSONE (DELTASONE) 20 MG tablet   median and ulnar neuropathy  Hand therapy: [facility] - [phone]  EMG ordered  Prescription Medication as directed: Prednisone. Take it in the AM and with food.  Ok to wear the brace at night / after work as needed    Follow up 5 days after the date of your EMG to discuss results and next steps. Call direct clinic number [[phone]] at any time with questions or concerns.    -----    SUBJECTIVE  Rayray Contreras is a/an 22 year old right hand dominant female who is seen in consultation at the request of Dr. Parra for evaluation of right thenar eminance hand pain. The patient is seen with their mother.    Onset: 8 years(s) ago. Reports insidious onset without acute precipitating event. 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   Worsened by: gripping things such as her clippers while at work, playing video games, cold  Better with: streching  Quality: numb in 4-5th digits, tingling sensations  Pain Scale (maximum/current)/10: 8/10/7/10  Treatments tried: no treatment tried to date  Orthopedic history: NO  Relevant surgical history: NO  Patient Social History: works at hair stylist    Patient's past medical, surgical, social, and family histories were reviewed today and no changes are noted.    REVIEW OF SYSTEMS:  10 point ROS is negative other than symptoms noted above in HPI, Past Medical History or as stated below  Constitutional: NEGATIVE for fever, chills, change in weight  Skin: NEGATIVE for worrisome rashes, moles or  "lesions  GI/: NEGATIVE for bowel or bladder changes  Neuro: NEGATIVE for weakness, dizziness or paresthesias    OBJECTIVE:  /84  Ht 5' 1.5\" (1.562 m)  Wt 182 lb (82.6 kg)  LMP 05/21/2017  BMI 33.83 kg/m2   General: healthy, alert and in no distress  HEENT: no scleral icterus or conjunctival erythema  Skin: no suspicious lesions or rash. No jaundice.  CV: regular rhythm by palpation  Resp: normal respiratory effort without conversational dyspnea   Psych: normal mood and affect  Gait: normal steady gait with appropriate coordination and balance  Neuro: normal light touch sensory exam of the bilateral hands although she appreciates numbness in all her digits.  MSK:  RIGHT HAND  Inspection:    No swelling or obvious deformity or asymmetry  Palpation:  Range of Motion:    Full active flexion and extension at MCP, PIP, and DIP joints; normal finger cascade without malrotation.  Wrist pronation, supination, and ulnar/radial deviation normal.  Strength:     full, extension full, flexion full  Special Tests:    Positive: Equivocal Phalen's    Negative: palpable triggering, Tinel's (carpal and cubital tunnel), Finkelstein's    Independent visualization of the below image:  None indicated at this time    Patient's conditions were thoroughly discussed during today's visit with greater than 50% of the visit spent counseling the patient with total time spent face-to-face with the patient being 15 minutes.    Madelyn Ledezma DO [facility]      Again, thank you for allowing me to participate in the care of your patient.        Sincerely,        Madelyn Ledezma, DO    "

## 2017-07-07 NOTE — PROGRESS NOTES
"ASSESSMENT & PLAN    ICD-10-CM    1. Numbness of right hand R20.0 NEUROLOGY ADULT REFERRAL     [facility], HAND, AND CHIROPRACTIC REFERRAL     predniSONE (DELTASONE) 20 MG tablet   median and ulnar neuropathy  Hand therapy: [facility] - [phone]  EMG ordered  Prescription Medication as directed: Prednisone. Take it in the AM and with food.  Ok to wear the brace at night / after work as needed    Follow up 5 days after the date of your EMG to discuss results and next steps. Call direct clinic number [[phone]] at any time with questions or concerns.    -----    SUBJECTIVE  Rayray Contreras is a/an 22 year old right hand dominant female who is seen in consultation at the request of Dr. Parra for evaluation of right thenar eminance hand pain. The patient is seen with their mother.    Onset: 8 years(s) ago. Reports insidious onset without acute precipitating event. 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   Worsened by: gripping things such as her clippers while at work, playing video games, cold  Better with: streching  Quality: numb in 4-5th digits, tingling sensations  Pain Scale (maximum/current)/10: 8/10/7/10  Treatments tried: no treatment tried to date  Orthopedic history: NO  Relevant surgical history: NO  Patient Social History: works at hair stylist    Patient's past medical, surgical, social, and family histories were reviewed today and no changes are noted.    REVIEW OF SYSTEMS:  10 point ROS is negative other than symptoms noted above in HPI, Past Medical History or as stated below  Constitutional: NEGATIVE for fever, chills, change in weight  Skin: NEGATIVE for worrisome rashes, moles or lesions  GI/: NEGATIVE for bowel or bladder changes  Neuro: NEGATIVE for weakness, dizziness or paresthesias    OBJECTIVE:  /84  Ht 5' 1.5\" (1.562 m)  Wt 182 lb (82.6 kg)  LMP 05/21/2017  BMI 33.83 kg/m2   General: healthy, alert and in no distress  HEENT: " no scleral icterus or conjunctival erythema  Skin: no suspicious lesions or rash. No jaundice.  CV: regular rhythm by palpation  Resp: normal respiratory effort without conversational dyspnea   Psych: normal mood and affect  Gait: normal steady gait with appropriate coordination and balance  Neuro: normal light touch sensory exam of the bilateral hands although she appreciates numbness in all her digits.  MSK:  RIGHT HAND  Inspection:    No swelling or obvious deformity or asymmetry  Palpation:  Range of Motion:    Full active flexion and extension at MCP, PIP, and DIP joints; normal finger cascade without malrotation.  Wrist pronation, supination, and ulnar/radial deviation normal.  Strength:     full, extension full, flexion full  Special Tests:    Positive: Equivocal Phalen's    Negative: palpable triggering, Tinel's (carpal and cubital tunnel), Finkelstein's    Independent visualization of the below image:  None indicated at this time    Patient's conditions were thoroughly discussed during today's visit with greater than 50% of the visit spent counseling the patient with total time spent face-to-face with the patient being 15 minutes.    Madelyn Ledezma DO [facility]

## 2017-07-07 NOTE — PATIENT INSTRUCTIONS
Thank you for allowing us to participate in your care today.  Please find below your visit diagnosis and the plan going forward.    1. Numbness of right hand      Hand therapy: [facility] - [phone]  EMG ordered  Prescription Medication as directed: Prednisone. Take it in the AM and with food.  Ok to wear the brace at night / after work as needed    Follow up 5 days after the date of your EMG to discuss results and next steps. Call direct clinic number [[phone]] at any time with questions or concerns.    Madelyn Ledezma DO CAQSM  [facility]  Website: [email or web address]  Twitter: [email or web address]

## 2017-07-12 ENCOUNTER — TRANSFERRED RECORDS (OUTPATIENT)
Dept: HEALTH INFORMATION MANAGEMENT | Facility: CLINIC | Age: 22
End: 2017-07-12

## 2017-07-17 ENCOUNTER — OFFICE VISIT (OUTPATIENT)
Dept: ORTHOPEDICS | Facility: CLINIC | Age: 22
End: 2017-07-17
Payer: COMMERCIAL

## 2017-07-17 VITALS — BODY MASS INDEX: 33.49 KG/M2 | HEIGHT: 62 IN | WEIGHT: 182 LBS

## 2017-07-17 DIAGNOSIS — R20.0 NUMBNESS OF RIGHT HAND: Primary | ICD-10-CM

## 2017-07-17 PROCEDURE — 99213 OFFICE O/P EST LOW 20 MIN: CPT | Performed by: FAMILY MEDICINE

## 2017-07-17 NOTE — NURSING NOTE
"Chief Complaint   Patient presents with     RECHECK       Initial Ht 5' 2\" (1.575 m)  Wt 182 lb (82.6 kg)  LMP 05/21/2017  BMI 33.29 kg/m2 Estimated body mass index is 33.29 kg/(m^2) as calculated from the following:    Height as of this encounter: 5' 2\" (1.575 m).    Weight as of this encounter: 182 lb (82.6 kg).  Medication Reconciliation: complete     Romel Perera ATC    "

## 2017-07-17 NOTE — MR AVS SNAPSHOT
After Visit Summary   7/17/2017    Rayray Contreras    MRN: [id number]           Patient Information     Date Of Birth          1995        Visit Information        Provider Department      7/17/2017 1:00 PM Madelyn Ledezma DO [facility]        Today's Diagnoses     Numbness of right hand    -  1      Care Instructions    Thank you for allowing us to participate in your care today.  Please find below your visit diagnosis and the plan going forward.    1. Numbness of right hand      Discussed EMG which was negative  Recommend following through with hand therapy  May need to wear brace during the day but will see how you progress with hand therapy    Follow up after 4-6 hand therapy sessions if not improving. Call direct clinic number [[phone]] at any time with questions or concerns.    Madelyn Ledezma DO [facility]  Website: [email or web address]  Twitter: [email or web address]            Follow-ups after your visit        Who to contact     If you have questions or need follow up information about today's clinic visit or your schedule please contact [facility] directly at [phone].  Normal or non-critical lab and imaging results will be communicated to you by MyChart, letter or phone within 4 business days after the clinic has received the results. If you do not hear from us within 7 days, please contact the clinic through [employer] or phone. If you have a critical or abnormal lab result, we will notify you by phone as soon as possible.  Submit refill requests through [employer] or call your pharmacy and they will forward the refill request to us. Please allow 3 business days for your refill to be completed.          Additional Information About Your Visit        MyChart Information     [employer] gives you secure access to your electronic health record. If you see a primary care provider, you can also send messages to  "your care team and make appointments. If you have questions, please call your primary care clinic.  If you do not have a primary care provider, please call [phone] and they will assist you.        Care EveryWhere ID     This is your Care EveryWhere ID. This could be used by other organizations to access your [place] medical records  [id number]        Your Vitals Were     Height Last Period BMI (Body Mass Index)             5' 2\" (1.575 m) 05/21/2017 33.29 kg/m2          Blood Pressure from Last 3 Encounters:   07/07/17 116/84   06/26/17 130/70   02/23/17 110/70    Weight from Last 3 Encounters:   07/17/17 182 lb (82.6 kg)   07/07/17 182 lb (82.6 kg)   06/26/17 180 lb 11.2 oz (82 kg)              Today, you had the following     No orders found for display       Primary Care Provider Office Phone # Fax #    Karen Weiler, [phone]       [facility] [address] [facility] [address]        Equal Access to Services     [facility]: Hadii aad ku hadasho Soomaali, waaxda luqadaha, qaybta kaalmada adeegyadanii, camilo rutherford . So [facility] [phone].    ATENCIÓN: Si habla español, tiene a acosta disposición servicios gratuitos de asistencia lingüística. Flavia al [phone].    We comply with applicable federal civil rights laws and [place] laws. We do not discriminate on the basis of race, color, national origin, age, disability sex, sexual orientation or gender identity.            Thank you!     Thank you for choosing [facility]  for your care. Our goal is always to provide you with excellent care. Hearing back from our patients is one way we can continue to improve our services. Please take a few minutes to complete the written survey that you may receive in the mail after your visit with us. Thank you!             Your Updated Medication List - Protect others around you: Learn how to safely use, store and throw away your medicines at " www.disposemymeds.org.          This list is accurate as of: 7/17/17  1:22 PM.  Always use your most recent med list.                   Brand Name Dispense Instructions for use Diagnosis    levonorgestrel-ethinyl estradiol 0.15-0.03 MG per tablet    SEASONALE    91 tablet    Take 1 tablet by mouth daily Must make appointment for future refills.    PCOS (polycystic ovarian syndrome)       predniSONE 20 MG tablet    DELTASONE    5 tablet    Take 1 tablet (20 mg) by mouth daily    Numbness of right hand

## 2017-07-17 NOTE — PROGRESS NOTES
"ASSESSMENT & PLAN    ICD-10-CM    1. Numbness of right hand R20.0    Negative EMG  Recommend following through with hand therapy  May need to wear brace during the day but will see how you progress with hand therapy. Works as  which is likely exacerbating her symptoms.    Follow up after 4-6 hand therapy sessions if not improving. Call direct clinic number [[phone]] at any time with questions or concerns. Instructed to call the office if the condition evolves or worsens.    -----    SUBJECTIVE:  Rayray Contreras is a 22 year old female who is seen in follow-up for numbness of right hand. They were last seen 7/7/2017.     They indicate that their pain level is 7/10. They have tried rest/activity avoidance (not playing as much video games as she likes to), previous imaging (Other EMG) and casting/splinting/bracing which she is only using at night. No significant improvement with prednisone.    Patient's past medical, surgical, social, and family histories were reviewed today and no changes are noted.    REVIEW OF SYSTEMS:  Constitutional: NEGATIVE for fever, chills, change in weight  Skin: NEGATIVE for worrisome rashes, moles or lesions  GI/: NEGATIVE for bowel or bladder changes  Neuro: NEGATIVE for weakness, dizziness or paresthesias    OBJECTIVE:  Ht 5' 2\" (1.575 m)  Wt 182 lb (82.6 kg)  LMP 05/21/2017  BMI 33.29 kg/m2   General: healthy, alert and in no distress  HEENT: no scleral icterus or conjunctival erythema  Skin: no suspicious lesions or rash. No jaundice.  CV: regular rhythm by palpation, no pedal edema  Resp: normal respiratory effort without conversational dyspnea   Psych: normal mood and affect  Gait: normal steady gait with appropriate coordination and balance  MSK:  Deferred    EMG (Dr. Loyola) dated 7/12/17  No abnormalities present.     Patient's conditions were thoroughly discussed during today's visit with greater than 50% of the visit spent counseling the patient with total " time spent face-to-face with the patient being 15 minutes.    Madelyn Ledezma DO [facility]

## 2017-07-17 NOTE — PATIENT INSTRUCTIONS
Thank you for allowing us to participate in your care today.  Please find below your visit diagnosis and the plan going forward.    1. Numbness of right hand      Discussed EMG which was negative  Recommend following through with hand therapy  May need to wear brace during the day but will see how you progress with hand therapy    Follow up after 4-6 hand therapy sessions if not improving. Call direct clinic number [[phone]] at any time with questions or concerns.    Madelyn Ledezma DO CAM  [facility]  Website: [email or web address]  Twitter: [email or web address]

## 2017-11-27 ENCOUNTER — TELEPHONE (OUTPATIENT)
Dept: ORTHOPEDICS | Facility: CLINIC | Age: 22
End: 2017-11-27

## 2017-11-27 NOTE — TELEPHONE ENCOUNTER
Medication request received:        Disp Refills Start End HOMER   predniSONE (DELTASONE) 20 MG tablet 5 tablet 0 7/7/2017  --   Sig: Take 1 tablet (20 mg) by mouth daily     Selected pharmacy in [facility]     Please advise   Case Perry, ATC, ATR

## 2017-12-21 ENCOUNTER — HOSPITAL ENCOUNTER (EMERGENCY)
Facility: CLINIC | Age: 22
Discharge: HOME | End: 2017-12-21
Attending: PHYSICIAN ASSISTANT | Admitting: PHYSICIAN ASSISTANT
Payer: COMMERCIAL

## 2017-12-21 VITALS
SYSTOLIC BLOOD PRESSURE: 134 MMHG | OXYGEN SATURATION: 100 % | HEART RATE: 94 BPM | DIASTOLIC BLOOD PRESSURE: 93 MMHG | RESPIRATION RATE: 18 BRPM | TEMPERATURE: 98.9 F

## 2017-12-21 DIAGNOSIS — R04.0 EPISTAXIS: ICD-10-CM

## 2017-12-21 DIAGNOSIS — R03.0 ELEVATED BLOOD PRESSURE READING: ICD-10-CM

## 2017-12-21 PROCEDURE — 99282 EMERGENCY DEPT VISIT SF MDM: CPT

## 2017-12-21 RX ORDER — OXYMETAZOLINE HYDROCHLORIDE 0.05 G/100ML
2 SPRAY NASAL ONCE
Status: DISCONTINUED | OUTPATIENT
Start: 2017-12-21 | End: 2017-12-21 | Stop reason: HOSPADM

## 2017-12-21 RX ORDER — TRANEXAMIC ACID 100 MG/ML
500 INJECTION, SOLUTION INTRAVENOUS ONCE
Status: DISCONTINUED | OUTPATIENT
Start: 2017-12-21 | End: 2017-12-21

## 2017-12-21 ASSESSMENT — ENCOUNTER SYMPTOMS
DIZZINESS: 0
ABDOMINAL PAIN: 0
LIGHT-HEADEDNESS: 1
FATIGUE: 1

## 2017-12-21 NOTE — ED AVS SNAPSHOT
[facility] Emergency Department    [address]    [facility] [address]    Phone:  [phone]    Fax:  [phone]                                       Rayray Contreras   MRN: [id number]    Department:  [facility] Emergency Department   Date of Visit:  12/21/2017           Patient Information     Date Of Birth          1995        Your diagnoses for this visit were:     Epistaxis     Elevated blood pressure reading        You were seen by Nahed Singleton PA-C.      Follow-up Information     Follow up with Weiler, Karen, MD In 2 days.    Specialty:  Family Practice    Contact information:    [address] [place] [address]  [phone]          Follow up with [facility] Emergency Department.    Specialty:  EMERGENCY MEDICINE    Why:  As needed    Contact information:    [address]  [facility] [address] [phone]        Follow up with [facility].    Contact information:    [address]  [place] [address] [phone]        Discharge Instructions       Use vaseline to nares twice a day.  Invest in a humidifier for your room.  Avoid blowing your nose today and avoid alcohol or warm drinks for the next two days, as this may cause your nose bleed to return.  Follow-up with your primary MD in two days for a recheck.  Return to ED with return of uncontrolled nose bleed lasting longer than 15-30 minutes, or if you feel faint or lightheaded.        Nosebleed (Adult)    Bleeding from the nose most commonly occurs because of injury or drying and cracking of the inner lining of the nose. Most nosebleeds are because of dry air or nose-picking. They can occur during a common cold or an allergy attack. They can also occur on a very hot day, or from dry air in the winter.  If the bleeding site is found, it may be cauterized. This means it is treated to cause a blood clot to form. This may  be done with a chemical, heat, or electricity. If the bleeding continues after the site is cauterized, or if the site cannot be found, packing may be put in your nose. This is to apply pressure and stop the bleeding. The packing may be made of gauze or sponge. A small balloon catheter is sometimes used. These must be removed by your doctor. Some types of packing dissolve on their own.  Home care    If packing was put in your nose, unless told otherwise, do not pull on it or try to remove it yourself. You will be given an appointment to have it removed. You may also have been given antibiotics to prevent a sinus infection. If so, finish all of the medicine.    Do not blow your nose for 12 hours after the bleeding stops. This will allow a strong blood clot to form. Do not pick your nose. This may restart bleeding.    Avoid drinking alcohol and hot liquids for the next 2 days. Alcohol or hot liquids in your mouth can dilate blood vessels in your nose. This can cause bleeding to start again.    Do not take ibuprofen, naproxen, or medicines that contain aspirin. These thin the blood and may cause your nose to bleed. You may take acetaminophen for pain, unless another pain medicine was prescribed.    If the bleeding starts again, sit up and lean forward to prevent swallowing blood. Pinch your nose tightly on both sides, as shown above, for 10 to 15 minutes. Time yourself. Don t release the pressure on your nose until 10 minutes is up. If bleeding does not stop, continue to pinch your nose and call your healthcare provider or return to this facility.    If you have a cold, allergies, or dry nasal membranes, lubricate the nasal passages. Apply a small amount of petroleum jelly inside the nose with a cotton swab twice a day (morning and night).    Avoid overheating your home. This can dry the air and make your condition worse.    Put a humidifier in the room where you sleep. This will add moisture to the air.    Use a saline  nasal spray to keep nasal passages moist.    Do not pick your nose. Keep fingernails trimmed to decrease risk of bleeds.    Do not smoke.  Follow-up care  Follow up with your healthcare provider, or as advised. Nasal packing should be rechecked or removed within 2 to 3 days.  When to seek medical advice  Call your healthcare provider right away if any of these occur.    You have another nosebleed that you cannot control    Dizziness, weakness, or fainting    You become tired or confused    Fever of 100.4 F (38 C) or higher, or as directed by your healthcare provider    Headache    Sinus or facial pain    Shortness of breath or trouble breathing  Date Last Reviewed: 3/22/2015    7247-0739 [employer]. [address] [place] [address], [place] [address]. All rights reserved. This information is not intended as a substitute for professional medical care. Always follow your healthcare professional's instructions.          Nosebleed  The skin inside your nose is fragile and filled with blood vessels. That's why even a slight injury to your nose sometimes may cause bleeding. Hard nose blowing, dry winter air, colds, and nose-picking can also cause nosebleeds. Medicines such as warfarin, aspirin, and other blood thinners can make it more likely to have a nosebleed that is difficult to stop. Normally, nosebleeds aren't a cause for concern. But in some cases, they can mean that you have a more serious health problem. Know when to seek medical care for a nosebleed.  When to go to the emergency room (ER)  Most nosebleeds aren t a medical emergency. In fact, you often can treat them yourself. But see your healthcare provider if you have nosebleeds often. And seek care right away if you:    Have a head injury    Have bleeding that lasts more than 15 to 30 minutes or is severe    Feel weak or faint    Have trouble breathing  What to expect in the ER    You will be examined and may have blood tests.    You may be given  medicated nose drops to stop the nosebleed.    The doctor may pack gauze into your nose to put pressure on the vessel and help stop bleeding.    The bleeding vessel may be cauterized. During this procedure, the vessel is burned with an electrical device or chemical. Your nose is first numbed so you won t feel any pain.    In rare cases, you may need surgery to control the bleeding.  Home care for a nosebleed    Don't blow your nose for 12 hours after the bleeding stops. This will allow a strong blood clot to form. Don't pick your nose. This may restart bleeding.    Don't drink alcohol or hot liquids for the next 2 days. Alcohol and hot liquids can dilate blood vessels in your nose. This can cause bleeding to start again.    Don't take ibuprofen, naproxen, or medicines that contain aspirin. These thin the blood and may cause your nose to bleed. You may take acetaminophen for pain, unless another pain medicine was prescribed.    If the bleeding starts again, sit up and lean forward to prevent swallowing blood. Pinch your nose tightly on both sides for 10 to 15 minutes. Time yourself. Don t release the pressure on your nose until 10 minutes is up. If bleeding doesn't stop, continue to pinch your nose. Call your healthcare provider.    If you have a cold, allergies, or dry nasal membranes, lubricate the nasal passages. Apply a small amount of petroleum jelly inside the nose with a cotton swab twice a day (morning and night).    Don't overheat your home. This can dry the air and make your condition worse.    Put a humidifier in the room where you sleep. This will add moisture to the air.    Use a saline nasal spray to keep nasal passages moist.    Don't pick your nose. Keep fingernails trimmed to decrease risk of bleeds.    Don't smoke.    Follow all other home care instructions from your healthcare provider.    Call your healthcare provider if you have any questions or concerns.  Date Last Reviewed: 10/1/2016     3126-5983 [employer]. [address] [place] [address], [place] [address]. All rights reserved. This information is not intended as a substitute for professional medical care. Always follow your healthcare professional's instructions.            24 Hour Appointment Hotline       To make an appointment at any [facility], call [phone] ([phone]). If you don't have a family doctor or clinic, we will help you find one. [facility] are conveniently located to serve the needs of you and your family.             Review of your medicines      Our records show that you are taking the medicines listed below. If these are incorrect, please call your family doctor or clinic.        Dose / Directions Last dose taken    levonorgestrel-ethinyl estradiol 0.15-0.03 MG per tablet   Commonly known as:  SEASONALE   Dose:  1 tablet   Quantity:  91 tablet        Take 1 tablet by mouth daily Must make appointment for future refills.   Refills:  2                Orders Needing Specimen Collection     None      Pending Results     No orders found from 12/19/2017 to 12/22/2017.            Pending Culture Results     No orders found from 12/19/2017 to 12/22/2017.            Pending Results Instructions     If you had any lab results that were not finalized at the time of your Discharge, you can call the ED Lab Result RN at [phone]. You will be contacted by this team for any positive Lab results or changes in treatment. The nurses are available 7 days a week from 10A to 6:30P.  You can leave a message 24 hours per day and they will return your call.        Test Results From Your Hospital Stay               Clinical Quality Measure: Blood Pressure Screening     Your blood pressure was checked while you were in the emergency department today. The last reading we obtained was  BP: (!) 134/93 . Please read the guidelines below about what these numbers mean and what you should do about them.  If your systolic blood  pressure (the top number) is less than 120 and your diastolic blood pressure (the bottom number) is less than 80, then your blood pressure is normal. There is nothing more that you need to do about it.  If your systolic blood pressure (the top number) is 120-139 or your diastolic blood pressure (the bottom number) is 80-89, your blood pressure may be higher than it should be. You should have your blood pressure rechecked within a year by a primary care provider.  If your systolic blood pressure (the top number) is 140 or greater or your diastolic blood pressure (the bottom number) is 90 or greater, you may have high blood pressure. High blood pressure is treatable, but if left untreated over time it can put you at risk for heart attack, stroke, or kidney failure. You should have your blood pressure rechecked by a primary care provider within the next 4 weeks.  If your provider in the emergency department today gave you specific instructions to follow-up with your doctor or provider even sooner than that, you should follow that instruction and not wait for up to 4 weeks for your follow-up visit.        Thank you for choosing [facility]       Thank you for choosing [facility] for your care. Our goal is always to provide you with excellent care. Hearing back from our patients is one way we can continue to improve our services. Please take a few minutes to complete the written survey that you may receive in the mail after you visit with us. Thank you!        [facility] Information     [facility] gives you secure access to your electronic health record. If you see a primary care provider, you can also send messages to your care team and make appointments. If you have questions, please call your primary care clinic.  If you do not have a primary care provider, please call [phone] and they will assist you.        Care EveryWhere ID     This is your Care EveryWhere ID. This could be used by other organizations to access your  [facility] medical records  [id number]        Equal Access to Services     GONZALO BAUTISTA : Sapna Vang, robles yu, candace burris, camilo pacheco. So [facility] [phone].    ATENCIÓN: Si habla español, tiene a acosta disposición servicios gratuitos de asistencia lingüística. Llame al [phone].    We comply with applicable federal civil rights laws and [place] laws. We do not discriminate on the basis of race, color, national origin, age, disability, sex, sexual orientation, or gender identity.            After Visit Summary       This is your record. Keep this with you and show to your community pharmacist(s) and doctor(s) at your next visit.

## 2017-12-21 NOTE — ED PROVIDER NOTES
History     Chief Complaint:  Epistaxis    HPI   Rayray Contreras is a non-anticoagulated 22 year old female who presents to the emergency department today for evaluation of a nosebleed. The patient reports that this morning she was getting ready for work at 1035 when she developed a nosebleed in her right nare. Since that time the patient reports that she has felt tired and slightly lightheaded and given her inability to control the bleeding she decided to come here to the emergency department. She denies any dizziness, nausea, vomiting, abnormal bruising and notes no sneeze, cough, or trauma that initially caused her nosebleed. Of note, the patient endorses a history of nosebleeds and states that she typically gets them in December.  Last nose bleed December 2016.  States she saw ENT after last nosebleed who recommended vaseline to bilateral nares daily, which she has been doing.      Allergies:  Chicken-Derived Products (Egg)  Lac Bovis    Medications:    Levonorgestrel-ethinyl estradiol     Past Medical History:    Developmental delay   Major depressive disorder, single episode, mild    PCOS (polycystic ovarian syndrome)  Carpal tunnel     Past Surgical History:    Surgical history reviewed. No pertinent surgical history.     Family History:    Father: Colon Cancer  Maternal Grandmother: Diabetes   Maternal Grandfather: Diabetes     Social History:  The patient was accompanied to the ED by her mother.  Smoking Status: Never Smoker  Smokeless Tobacco: Never Used  Alcohol Use: Negative   Marital Status: Single      Review of Systems   Constitutional: Positive for fatigue.   HENT: Positive for nosebleeds.    Gastrointestinal: Negative for abdominal pain.   Neurological: Positive for light-headedness. Negative for dizziness.   All other systems reviewed and are negative.    Physical Exam     Patient Vitals for the past 24 hrs:   BP Temp Temp src Pulse Resp SpO2   12/21/17 1220 (!) 134/93 - - 94 18 100 %    12/21/17 1110 (!) 150/94 98.9  F (37.2  C) Temporal 81 18 100 %     Physical Exam  Constitutional: Alert, attentive, GCS 15.    HENT:  Oropharynx is clear without erythema or exudates, mucous membranes are moist. No blood noted in posterior pharynx. Ears: TMs gray, translucent, with normal light reflex; landmarks present.  External canals normal. Nose: Blood noted in right nare.  Unable to visualize active bleed.  Left nare clear and patent.  Eyes: EOM are normal. Pupils are equal, round, and reactive to light. Conjunctiva pink, no scleral icterus or conjunctival injection  CV: regular rate and rhythm; no murmurs, rubs or gallups.  Radial pulses 2+ bilaterally.  Cap refill <3 seconds.  Respiratory: Effort normal. Lungs clear to auscultation bilaterally. No crackles/rubs/wheezes.  Good air movement.  Neurological: Alert, attentive.  Skin: Skin is warm and dry.  No rashes, bruising or petechiae.  Psychiatric: Normal affect.    Emergency Department Course   Procedures:  Epistaxis Management  Performed by: Jennifer Yancey NP and CATHY Easton    Technique:  Afrin nasal was sprayed in the nares bilaterally. On examination, a small area of slow venous bleeding was isolated to the right anterior septum.  Silver nitrate cautery was then applied to the area of bleeding. A nasal clamp was placed. On re-examination 20 minutes later there is no residual bleeding externally at the naris or in the posterior pharynx.    Complications: none    Interventions:    oxymetazoline (AFRIN) 0.05 % spray 2 spray   silver nitrate (ARZOL) Misc 2 applicators     Emergency Department Course:    1112 Nursing notes and vitals reviewed.    1113 I performed an exam of the patient as documented above.     1130 CATHY Forrest in to evaluate patient    1140 Epistaxis management as described above    I personally reviewed the procedure results with the patient and answered all related questions prior to discharge.    Impression & Plan      Medical Decision  Making:  Rayray Contreras is a 22 year old female who presents for evaluation of epistaxis. Silver nitrate was used to cauterize the bleeding source, see above procedure note.  The bleeding stopped and did not restart here in ED, therefore no nasal packing is indicated.  There are no signs of coagulopathy causing the bleeding or a general medical condition (thrombocytopenia, DIC, leukemia, etc) causing the bleeding today. Patient is appropriate for outpatient management.  She was instructed to increase vaseline to nares to twice daily and invest in a humidifier, which she states she can afford.  She will follow-up with her primary MD as needed and return to ED with recurrence of uncontrolled bleeding or any further concerns. All questions answered prior to discharge.    Diagnosis:    ICD-10-CM    1. Epistaxis R04.0    2. Elevated blood pressure reading R03.0      Disposition:   The patient is discharged to home.    Scribe Disclosure:  I, Anish Estrada, am serving as a scribe at 11:10 AM on 12/21/2017 to document services personally performed by ELKIN Cobian CNP, based on my observations and the provider's statements to me.    Emergency Department Attending Supervision Note  12/21/2017  12:35 PM      I evaluated this patient in conjunction with Jennifer Pizano CNP.       Briefly, the patient presented with epistaxis involving the right nare. No trauma. History of nosebleeds in winter, last one year ago. Not on blood thinners.       On my exam, small amount of oozing to anterior septum of right nare; otherwise well appearing, non toxic, MMM, no pallor.        ED course:  I evaluated the patient. The above procedure was performed by CNP with my oversight. Patient was monitored here for total of 30 min without recurrence in bleeding. She was discharged with all questions answered.     My impression is epistaxis, right nare. This was controlled after silver nitrate cautery. She was monitored here without recurrence of  bleeding. Given this, no indication for nasal packing and I feel she is safe for discharge home. Advised if recurrent bleeding at home, to hold pressure/nasal clamp x 20 minutes and if still bleeding, she is to present here for further management. Low suspicion for significant blood loss and given no blood thinners and well appearance, no indication for blood work. Suspect related to dry nares. Advised to increase her daily vaseline use to 2-3 times daily and follow up with ENT if nosebleeds become more frequent.     Of note, blood pressure slightly elevated here. This improved on recheck. No clinical history concerning for end organ damage. No signs of hypertensive urgency or emergency. Advised to have rechecked with PCP.     Diagnosis:    ICD-10-CM    1. Epistaxis R04.0    2. Elevated blood pressure reading R03.0      Nahed Singleton PA-C    [facility] EMERGENCY DEPARTMENT       Nahed Singleton PA-C  12/21/17 134

## 2017-12-21 NOTE — ED AVS SNAPSHOT
[facility] Emergency Department    [address]    [facility] [address]    Phone:  [phone]    Fax:  [phone]                                       Rayray Contreras   MRN: [id number]    Department:  [facility] Emergency Department   Date of Visit:  12/21/2017           After Visit Summary Signature Page     I have received my discharge instructions, and my questions have been answered. I have discussed any challenges I see with this plan with the nurse or doctor.    ..........................................................................................................................................  Patient/Patient Representative Signature      ..........................................................................................................................................  Patient Representative Print Name and Relationship to Patient    ..................................................               ................................................  Date                                            Time    ..........................................................................................................................................  Reviewed by Signature/Title    ...................................................              ..............................................  Date                                                            Time

## 2017-12-21 NOTE — LETTER
[facility] EMERGENCY DEPARTMENT  [address]  [facility] [address]  Phone: [phone]  Fax: [phone]    December 21, 2017        Rayray Contreras  [address] [facility] [address] [place] [address]          To whom it may concern:    RE: Rayray Contreras    Please excuse Karenia Ben from work today as she was seen here in the ED for nosebleed and advised to do light activity/rest the remainder of the day.     Please contact me for questions or concerns.      Sincerely,  Nahed Singleton PA-C      No name on file.

## 2017-12-21 NOTE — ED NOTES
Patient presents to the ED with epistaxis in right nare x 1 hour. Reports history of frequent bloody noses.

## 2017-12-21 NOTE — LETTER
[facility] EMERGENCY DEPARTMENT  [address]  [facility] [address]  Phone: [phone]  Fax: [phone]    December 21, 2017        Rayray Contreras  [address] [facility] [address] [place] [address]          To whom it may concern:    RE: Rayray Contreras    Please excuse Rayray's friend from work as she accompanied Rayray to the emergency department.     Please contact me for questions or concerns.      Sincerely,  Nahed Singleton PA-C

## 2018-01-20 ENCOUNTER — OFFICE VISIT (OUTPATIENT)
Dept: URGENT CARE | Facility: URGENT CARE | Age: 23
End: 2018-01-20
Payer: COMMERCIAL

## 2018-01-20 VITALS
DIASTOLIC BLOOD PRESSURE: 78 MMHG | OXYGEN SATURATION: 97 % | TEMPERATURE: 98.8 F | BODY MASS INDEX: 33.29 KG/M2 | WEIGHT: 182 LBS | SYSTOLIC BLOOD PRESSURE: 118 MMHG | HEART RATE: 65 BPM

## 2018-01-20 DIAGNOSIS — J01.90 ACUTE SINUSITIS WITH SYMPTOMS > 10 DAYS: ICD-10-CM

## 2018-01-20 DIAGNOSIS — J20.9 ACUTE BRONCHITIS, UNSPECIFIED ORGANISM: Primary | ICD-10-CM

## 2018-01-20 PROCEDURE — 99214 OFFICE O/P EST MOD 30 MIN: CPT | Performed by: FAMILY MEDICINE

## 2018-01-20 RX ORDER — FLUTICASONE PROPIONATE 50 MCG
1-2 SPRAY, SUSPENSION (ML) NASAL DAILY
Qty: 16 G | Refills: 0 | Status: SHIPPED | OUTPATIENT
Start: 2018-01-20 | End: 2018-03-14

## 2018-01-20 RX ORDER — DOXYCYCLINE 100 MG/1
100 CAPSULE ORAL 2 TIMES DAILY
Qty: 20 CAPSULE | Refills: 0 | Status: SHIPPED | OUTPATIENT
Start: 2018-01-20 | End: 2018-01-30

## 2018-01-20 NOTE — MR AVS SNAPSHOT
After Visit Summary   1/20/2018    Rayray Contreras    MRN: [id number]           Patient Information     Date Of Birth          1995        Visit Information        Provider Department      1/20/2018 3:50 PM Kathya Bradley MD [facility]        Today's Diagnoses     Acute bronchitis, unspecified organism    -  1    Acute sinusitis with symptoms > 10 days          Care Instructions      What Is Acute Bronchitis?  Acute bronchitis is when the airways in your lungs (bronchial tubes) become red and swollen (inflamed). It is usually caused by a viral infection. But it can also occur because of a bacteria or allergen. Symptoms include a cough that produces yellow or greenish mucus and can last for days or sometimes weeks.  Inside healthy lungs    Air travels in and out of the lungs through the airways. The linings of these airways produce sticky mucus. This mucus traps particles that enter the lungs. Tiny structures called cilia then sweep the particles out of the airways.     Healthy airway: Airways are normally open. Air moves in and out easily.      Healthy cilia: Tiny, hairlike cilia sweep mucus and particles up and out of the airways.   Lungs with bronchitis  Bronchitis often occurs with a cold or the flu virus. The airways become inflamed (red and swollen). There is a deep hacking cough from the extra mucus. Other symptoms may include:    Wheezing    Chest discomfort    Shortness of breath    Mild fever  A second infection, this time due to bacteria, may then occur. And airways irritated by allergens or smoke are more likely to get infected.        Inflamed airway: Inflammation and extra mucus narrow the airway, causing shortness of breath.      Impaired cilia: Extra mucus impairs cilia, causing congestion and wheezing. Smoking makes the problem worse.   Making a diagnosis  A physical exam, health history, and certain tests help your healthcare provider make the  diagnosis.  Health history  Your healthcare provider will ask you about your symptoms.  The exam  Your provider listens to your chest for signs of congestion. He or she may also check your ears, nose, and throat.  Possible tests    A sputum test for bacteria. This requires a sample of mucus from your lungs.    A nasal or throat swab. This tests to see if you have a bacterial infection.    A chest X-ray. This is done if your healthcare provider thinks you have pneumonia.    Tests to check for an underlying condition. Other tests may be done to check for things such as allergies, asthma, or COPD (chronic obstructive pulmonary disease). You may need to see a specialist for more lung function testing.  Treating a cough  The main treatment for bronchitis is easing symptoms. Avoiding smoke, allergens, and other things that trigger coughing can often help. If the infection is bacterial, you may be given antibiotics. During the illness, it's important to get plenty of sleep. To ease symptoms:    Don t smoke. Also avoid secondhand smoke.    Use a humidifier. Or try breathing in steam from a hot shower. This may help loosen mucus.    Drink a lot of water and juice. They can soothe the throat and may help thin mucus.    Sit up or use extra pillows when in bed. This helps to lessen coughing and congestion.    Ask your provider about using medicine. Ask about using cough medicine, pain and fever medicine, or a decongestant.  Antibiotics  Most cases of bronchitis are caused by cold or flu viruses. They don t need antibiotics to treat them, even if your mucus is thick and green or yellow. Antibiotics don t treat viral illness and antibiotics have not been shown to have any benefit in cases of acute bronchitis. Taking antibiotics when they are not needed increases your risk of getting an infection later that is antibiotic-resistant. Antibiotics can also cause severe cases of diarrhea that require other antibiotics to treat.  It is  important that you accept your healthcare provider's opinion to not use antibiotics. Your provider will prescribe antibiotics if the infection is caused by bacteria. If they are prescribed:    Take all of the medicine. Take the medicine until it is used up, even if symptoms have improved. If you don t, the bronchitis may come back.    Take the medicines as directed. For instance, some medicines should be taken with food.    Ask about side effects. Ask your provider or pharmacist what side effects are common, and what to do about them.  Follow-up care  You should see your provider again in 2 to 3 weeks. By this time, symptoms should have improved. An infection that lasts longer may mean you have a more serious problem.  Prevention    Avoid tobacco smoke. If you smoke, quit. Stay away from smoky places. Ask friends and family not to smoke around you, or in your home or car.    Get checked for allergies.    Ask your provider about getting a yearly flu shot. Also ask about pneumococcal or pneumonia shots.    Wash your hands often. This helps reduce the chance of picking up viruses that cause colds and flu.  Call your healthcare provider if:    Symptoms worsen, or you have new symptoms    Breathing problems worsen or  become severe    Symptoms don t get better within a week, or within 3 days of taking antibiotics   Date Last Reviewed: 2/1/2017 2000-2017 [employer]. [address] [place] [address], [place] [id number]. All rights reserved. This information is not intended as a substitute for professional medical care. Always follow your healthcare professional's instructions.        Acute Sinusitis    Acute sinusitis is irritation and swelling of the sinuses. It is usually caused by a viral infection after a common cold. Your doctor can help you find relief.  What is acute sinusitis?  Sinuses are air-filled spaces in the skull behind the face. They are kept moist and clean by a lining of mucosa. Things such as  pollen, smoke, and chemical fumes can irritate the mucosa. It can then swell up. As a response to irritation, the mucosa makes more mucus and other fluids. Tiny hairlike cilia cover the mucosa. Cilia help carry mucus toward the opening of the sinus. Too much mucus may cause the cilia to stop working. This blocks the sinus opening. A buildup of fluid in the sinuses then causes pain and pressure. It can also encourage bacteria to grow in the sinuses.  Common symptoms of acute sinusitis  You may have:    Facial soreness pain    Headache    Fever    Fluid draining in the back of the throat (postnasal drip)    Congestion    Drainage that is thick and colored, instead of clear    Cough  Diagnosing acute sinusitis  Your doctor will ask about your symptoms and health history. He or she will look at your ear, nose, and throat. You usually won't need to have X-rays taken.    The doctor may take a sample of mucus to check for bacteria. If you have sinusitis that keeps coming back, you may need imaging tests such as X-rays or CAT scans. This will help your doctor check for a structural problem that may be causing the infection.  Treating acute sinusitis  Treatment is aimed at unblocking the sinus opening and helping the cilia work again. You may need to take antihistamine and decongestant medicine. These can reduce inflammation and decrease the amount of fluid your sinuses make. If you have a bacterial infection, you will need to take antibiotic medicine for 10 to 14 days. Take this medicine until it is gone, even if you feel better.  Date Last Reviewed: 10/1/2016    7975-8112 [employer]. [address] [place] [address], [place] [address]. All rights reserved. This information is not intended as a substitute for professional medical care. Always follow your healthcare professional's instructions.                Follow-ups after your visit        Who to contact     If you have questions or need follow up information about  today's clinic visit or your schedule please contact [facility] directly at [phone].  Normal or non-critical lab and imaging results will be communicated to you by [employer], letter or phone within 4 business days after the clinic has received the results. If you do not hear from us within 7 days, please contact the clinic through [employer]Planhart or phone. If you have a critical or abnormal lab result, we will notify you by phone as soon as possible.  Submit refill requests through [employer]volver or call your pharmacy and they will forward the refill request to us. Please allow 3 business days for your refill to be completed.          Additional Information About Your Visit        [employer] Information     [employer] gives you secure access to your electronic health record. If you see a primary care provider, you can also send messages to your care team and make appointments. If you have questions, please call your primary care clinic.  If you do not have a primary care provider, please call [phone] and they will assist you.        Care EveryWhere ID     This is your Care EveryWhere ID. This could be used by other organizations to access your [facility] medical records  [id number]        Your Vitals Were     Pulse Temperature Pulse Oximetry BMI (Body Mass Index)          65 98.8  F (37.1  C) (Oral) 97% 33.29 kg/m2         Blood Pressure from Last 3 Encounters:   01/20/18 118/78   12/21/17 (!) 134/93   07/07/17 116/84    Weight from Last 3 Encounters:   01/20/18 182 lb (82.6 kg)   07/17/17 182 lb (82.6 kg)   07/07/17 182 lb (82.6 kg)              Today, you had the following     No orders found for display         Today's Medication Changes          These changes are accurate as of: 1/20/18  5:43 PM.  If you have any questions, ask your nurse or doctor.               Start taking these medicines.        Dose/Directions    doxycycline 100 MG capsule   Commonly known as:  VIBRAMYCIN   Used for:  Acute sinusitis  with symptoms > 10 days, Acute bronchitis, unspecified organism   Started by:  Kathya Bradley MD        Dose:  100 mg   Take 1 capsule (100 mg) by mouth 2 times daily for 10 days   Quantity:  20 capsule   Refills:  0       fluticasone 50 MCG/ACT spray   Commonly known as:  FLONASE   Used for:  Acute sinusitis with symptoms > 10 days   Started by:  Kathya Bradley MD        Dose:  1-2 spray   Spray 1-2 sprays into both nostrils daily   Quantity:  16 g   Refills:  0            Where to get your medicines      These medications were sent to [facility] [address] [facility] [address] [facility] [address], [facility] [address]    Hours:  24-hours Phone:  [phone]     doxycycline 100 MG capsule    fluticasone 50 MCG/ACT spray                Primary Care Provider Office Phone # Fax #    Karen Weiler, [phone] [address]  [place] [address]        Equal Access to Services     [facility] AH: Hadii aad ku hadasho Soomaali, waaxda luqadaha, qaybta kaalmada adeegyada, waxay idiin hayasiyan bessy [place] . So [facility] [phone].    ATENCIÓN: Si habla español, tiene a acosta disposición servicios gratuitos de asistencia lingüística. Llame al [phone].    We comply with applicable federal civil rights laws and [place] laws. We do not discriminate on the basis of race, color, national origin, age, disability, sex, sexual orientation, or gender identity.            Thank you!     Thank you for choosing [facility]  for your care. Our goal is always to provide you with excellent care. Hearing back from our patients is one way we can continue to improve our services. Please take a few minutes to complete the written survey that you may receive in the mail after your visit with us. Thank you!             Your Updated Medication List - Protect others around you: Learn how to safely use, store and throw away your  medicines at www.disposemymeds.org.          This list is accurate as of: 1/20/18  5:43 PM.  Always use your most recent med list.                   Brand Name Dispense Instructions for use Diagnosis    doxycycline 100 MG capsule    VIBRAMYCIN    20 capsule    Take 1 capsule (100 mg) by mouth 2 times daily for 10 days    Acute sinusitis with symptoms > 10 days, Acute bronchitis, unspecified organism       fluticasone 50 MCG/ACT spray    FLONASE    16 g    Spray 1-2 sprays into both nostrils daily    Acute sinusitis with symptoms > 10 days       levonorgestrel-ethinyl estradiol 0.15-0.03 MG per tablet    SEASONALE    91 tablet    Take 1 tablet by mouth daily Must make appointment for future refills.    PCOS (polycystic ovarian syndrome)

## 2018-01-20 NOTE — PATIENT INSTRUCTIONS
What Is Acute Bronchitis?  Acute bronchitis is when the airways in your lungs (bronchial tubes) become red and swollen (inflamed). It is usually caused by a viral infection. But it can also occur because of a bacteria or allergen. Symptoms include a cough that produces yellow or greenish mucus and can last for days or sometimes weeks.  Inside healthy lungs    Air travels in and out of the lungs through the airways. The linings of these airways produce sticky mucus. This mucus traps particles that enter the lungs. Tiny structures called cilia then sweep the particles out of the airways.     Healthy airway: Airways are normally open. Air moves in and out easily.      Healthy cilia: Tiny, hairlike cilia sweep mucus and particles up and out of the airways.   Lungs with bronchitis  Bronchitis often occurs with a cold or the flu virus. The airways become inflamed (red and swollen). There is a deep hacking cough from the extra mucus. Other symptoms may include:    Wheezing    Chest discomfort    Shortness of breath    Mild fever  A second infection, this time due to bacteria, may then occur. And airways irritated by allergens or smoke are more likely to get infected.        Inflamed airway: Inflammation and extra mucus narrow the airway, causing shortness of breath.      Impaired cilia: Extra mucus impairs cilia, causing congestion and wheezing. Smoking makes the problem worse.   Making a diagnosis  A physical exam, health history, and certain tests help your healthcare provider make the diagnosis.  Health history  Your healthcare provider will ask you about your symptoms.  The exam  Your provider listens to your chest for signs of congestion. He or she may also check your ears, nose, and throat.  Possible tests    A sputum test for bacteria. This requires a sample of mucus from your lungs.    A nasal or throat swab. This tests to see if you have a bacterial infection.    A chest X-ray. This is done if your healthcare  provider thinks you have pneumonia.    Tests to check for an underlying condition. Other tests may be done to check for things such as allergies, asthma, or COPD (chronic obstructive pulmonary disease). You may need to see a specialist for more lung function testing.  Treating a cough  The main treatment for bronchitis is easing symptoms. Avoiding smoke, allergens, and other things that trigger coughing can often help. If the infection is bacterial, you may be given antibiotics. During the illness, it's important to get plenty of sleep. To ease symptoms:    Don t smoke. Also avoid secondhand smoke.    Use a humidifier. Or try breathing in steam from a hot shower. This may help loosen mucus.    Drink a lot of water and juice. They can soothe the throat and may help thin mucus.    Sit up or use extra pillows when in bed. This helps to lessen coughing and congestion.    Ask your provider about using medicine. Ask about using cough medicine, pain and fever medicine, or a decongestant.  Antibiotics  Most cases of bronchitis are caused by cold or flu viruses. They don t need antibiotics to treat them, even if your mucus is thick and green or yellow. Antibiotics don t treat viral illness and antibiotics have not been shown to have any benefit in cases of acute bronchitis. Taking antibiotics when they are not needed increases your risk of getting an infection later that is antibiotic-resistant. Antibiotics can also cause severe cases of diarrhea that require other antibiotics to treat.  It is important that you accept your healthcare provider's opinion to not use antibiotics. Your provider will prescribe antibiotics if the infection is caused by bacteria. If they are prescribed:    Take all of the medicine. Take the medicine until it is used up, even if symptoms have improved. If you don t, the bronchitis may come back.    Take the medicines as directed. For instance, some medicines should be taken with food.    Ask about  side effects. Ask your provider or pharmacist what side effects are common, and what to do about them.  Follow-up care  You should see your provider again in 2 to 3 weeks. By this time, symptoms should have improved. An infection that lasts longer may mean you have a more serious problem.  Prevention    Avoid tobacco smoke. If you smoke, quit. Stay away from smoky places. Ask friends and family not to smoke around you, or in your home or car.    Get checked for allergies.    Ask your provider about getting a yearly flu shot. Also ask about pneumococcal or pneumonia shots.    Wash your hands often. This helps reduce the chance of picking up viruses that cause colds and flu.  Call your healthcare provider if:    Symptoms worsen, or you have new symptoms    Breathing problems worsen or  become severe    Symptoms don t get better within a week, or within 3 days of taking antibiotics   Date Last Reviewed: 2/1/2017 2000-2017 The "[employer]. [address] [place] [address]. All rights reserved. This information is not intended as a substitute for professional medical care. Always follow your healthcare professional's instructions.        Acute Sinusitis    Acute sinusitis is irritation and swelling of the sinuses. It is usually caused by a viral infection after a common cold. Your doctor can help you find relief.  What is acute sinusitis?  Sinuses are air-filled spaces in the skull behind the face. They are kept moist and clean by a lining of mucosa. Things such as pollen, smoke, and chemical fumes can irritate the mucosa. It can then swell up. As a response to irritation, the mucosa makes more mucus and other fluids. Tiny hairlike cilia cover the mucosa. Cilia help carry mucus toward the opening of the sinus. Too much mucus may cause the cilia to stop working. This blocks the sinus opening. A buildup of fluid in the sinuses then causes pain and pressure. It can also encourage bacteria to grow in  the sinuses.  Common symptoms of acute sinusitis  You may have:    Facial soreness pain    Headache    Fever    Fluid draining in the back of the throat (postnasal drip)    Congestion    Drainage that is thick and colored, instead of clear    Cough  Diagnosing acute sinusitis  Your doctor will ask about your symptoms and health history. He or she will look at your ear, nose, and throat. You usually won't need to have X-rays taken.    The doctor may take a sample of mucus to check for bacteria. If you have sinusitis that keeps coming back, you may need imaging tests such as X-rays or CAT scans. This will help your doctor check for a structural problem that may be causing the infection.  Treating acute sinusitis  Treatment is aimed at unblocking the sinus opening and helping the cilia work again. You may need to take antihistamine and decongestant medicine. These can reduce inflammation and decrease the amount of fluid your sinuses make. If you have a bacterial infection, you will need to take antibiotic medicine for 10 to 14 days. Take this medicine until it is gone, even if you feel better.  Date Last Reviewed: 10/1/2016    3638-0860 [employer]. [address] [place] [address], [place] [address]. All rights reserved. This information is not intended as a substitute for professional medical care. Always follow your healthcare professional's instructions.

## 2018-01-21 NOTE — PROGRESS NOTES
SUBJECTIVE:  Chief Complaint   Patient presents with     Urgent Care     URI     cough, chest congestion, back pain, sinus congestion, chronic sinus infections      Rayray Contreras is a 22 year old female here with concerns about  cough  and central chest pain. and sinus pressure and pain.    Patient denies pleuritic chest pain and wheezing.   She states onset of symptoms were 2 week(s) ago.  Her cough is described as persistent, daytime, nightime and productive of yellow sputum.    The patient's respiratory symptoms are moderate and worsening.      The patient's respiratory symptoms are exacerbated by no particular triggers    She has had maxillary, frontal pressure.   Course of the sinus symptoms  is worsening and the severity is moderate  Predisposing factors for developing sinus symptoms include HX of recurrent sinusitis and recent illness.  Current and Associated symptoms:  nasal congestion, facial pain/pressure and headache  Recent treatment has included: OTC meds      Past Medical History:   Diagnosis Date     Developmental delay      Major depressive disorder, single episode, mild (H) 7/15/2010     PCOS (polycystic ovarian syndrome) 7/15/2010       ALLERGIES:  Chicken-derived products (egg) and Lac bovis      Current Outpatient Prescriptions on File Prior to Visit:  levonorgestrel-ethinyl estradiol (SEASONALE) 0.15-0.03 MG per tablet Take 1 tablet by mouth daily Must make appointment for future refills.     No current facility-administered medications on file prior to visit.     Social History   Substance Use Topics     Smoking status: Never Smoker     Smokeless tobacco: Never Used     Alcohol use No      Comment: once every few months        Family History   Problem Relation Age of Onset     Colon Cancer Father      DIABETES Maternal Grandmother      DIABETES Maternal Grandfather        ROS:  CONSTITUTIONAL:NEGATIVE for fever, chills,    INTEGUMENTARY/SKIN: NEGATIVE for worrisome rashes,    EYES: NEGATIVE  for vision changes or irritation  GI: NEGATIVE for nausea, abdominal pain,      OBJECTIVE:  /78  Pulse 65  Temp 98.8  F (37.1  C) (Oral)  Wt 182 lb (82.6 kg)  SpO2 97%  BMI 33.29 kg/m2  Exam:GENERAL APPEARANCE: alert, moderate distress and cooperative  EYES: EOMI,  PERRL, conjunctiva clear  HENT: ear canals and TM's normal.  Nose and mouth without ulcers, erythema or lesions  HENT: frontal sinus tenderness  and maxillary sinus tenderness   NECK: supple, nontender, no lymphadenopathy  RESP: lungs clear to auscultation - no rales, rhonchi or wheezes  CV: regular rates and rhythm, normal S1 S2, no murmur noted  NEURO: Normal strength and tone, sensory exam grossly normal,  normal speech and mentation  SKIN: no suspicious lesions or rashes       ASSESSMENT:  Acute bronchitis, unspecified organism     - doxycycline (VIBRAMYCIN) 100 MG capsule; Take 1 capsule (100 mg) by mouth 2 times daily for 10 days      Symptomatic measures for cough and chest congestion are encouraged, humidified air, plenty of fluids.  Patient may consider OTC expectorant and/or cough suppressant to treat symptoms.    Acute sinusitis with symptoms > 10 days     - doxycycline (VIBRAMYCIN) 100 MG capsule; Take 1 capsule (100 mg) by mouth 2 times daily for 10 days  - fluticasone (FLONASE) 50 MCG/ACT spray; Spray 1-2 sprays into both nostrils daily        We discussed the primary importance of home cares to promote drainage and ventilation of the sinuses to decrease symptoms of sinus pressure and to eliminate infectious drainage from the sinuses.  I encouraged the use of saline nasal spray as needed to promote cleaning of the nasal passages and to promote drainage of the sinuses.  Allergy medications and steroid nasal spray help reduce swelling within the nasal tissue and may help open drainage/ ventilation passages to the sinuses.  Expectorants are recommended rather than decongestants to help promote sinus drainage.        Follow up with  primary clinic if not improving

## 2018-01-23 ENCOUNTER — TELEPHONE (OUTPATIENT)
Dept: FAMILY MEDICINE | Facility: CLINIC | Age: 23
End: 2018-01-23

## 2018-01-23 ENCOUNTER — OFFICE VISIT (OUTPATIENT)
Dept: FAMILY MEDICINE | Facility: CLINIC | Age: 23
End: 2018-01-23
Payer: COMMERCIAL

## 2018-01-23 VITALS
OXYGEN SATURATION: 100 % | WEIGHT: 177 LBS | HEART RATE: 66 BPM | TEMPERATURE: 98.8 F | DIASTOLIC BLOOD PRESSURE: 76 MMHG | BODY MASS INDEX: 32.57 KG/M2 | HEIGHT: 62 IN | SYSTOLIC BLOOD PRESSURE: 110 MMHG

## 2018-01-23 DIAGNOSIS — M94.0 COSTOCHONDRITIS: ICD-10-CM

## 2018-01-23 DIAGNOSIS — J20.9 ACUTE BRONCHITIS, UNSPECIFIED ORGANISM: Primary | ICD-10-CM

## 2018-01-23 PROCEDURE — 99213 OFFICE O/P EST LOW 20 MIN: CPT | Performed by: PHYSICIAN ASSISTANT

## 2018-01-23 NOTE — MR AVS SNAPSHOT
After Visit Summary   1/23/2018    Rayray Contreras    MRN: [id number]           Patient Information     Date Of Birth          1995        Visit Information        Provider Department      1/23/2018 2:20 PM eYsi Dumont PA-C [facility]        Care Instructions      For the muscle aches and pains, can tylenol for the pain.      Costochondritis    Costochondritis is inflammation of a rib or the cartilage that connects a rib to your breastbone (sternum). It causes tenderness, and sometimes chest pain may be sharp or aching, or it may feel like pressure. Pain may get worse with deep breathing, movement, or exercise. In some cases, the pain is mistaken for a heart attack. Despite this, the condition is not serious. Read on to learn more about the condition and how it can be treated.  What causes costochondritis?  The cause of costochondritis is not completely clear, but it may happen after a chest injury, chest infection, or coughing episode. Some physical activities can sometimes lead to costochondritis. Large-breasted women may be more likely to have the condition. Often, the reason for the inflammation is unknown.  Diagnosing costochondritis  There is no test for costochondritis. The condition is diagnosed by the symptoms you have. Your healthcare provider will perform a physical exam. He or she will ask you about your symptoms and examine your chest for tenderness. In some cases, tests are done to rule out more serious problems. These tests may include imaging tests such as chest X-ray, CT scan, or an ECG.  Treating costochondritis  If an underlying cause is found, treatment for that will likely relieve the problem. Costochondritis often goes away on its own. The course of the condition varies from person to person. It usually lasts from weeks to months. In some cases, mild symptoms continue for months to years. To ease symptoms:    Take medicine as directed. These relieve  pain and swelling. Ibuprofen or other NSAIDs are often recommended. In some cases, you may be given prescription medicine, such as muscle relaxants.    Avoid activities that put stress on the chest or spine.    Apply a heating pad (set to warm, not too high, heat) to the breastbone several times a day.    Perform stretching exercises as directed.  Call the healthcare provider right away if you have any of the following:    Pain that is not relieved by medicine    Shortness of breath    Lightheadedness, dizziness, or fainting    Feeling of irregular heartbeat or fast pulse  Anyone with chest pain should see a healthcare provider, especially those who are older and may be at risk for heart disease.   Date Last Reviewed: 10/1/2016    4011-7110 [employer]. [address] [place] [address], [place] [address]. All rights reserved. This information is not intended as a substitute for professional medical care. Always follow your healthcare professional's instructions.                Follow-ups after your visit        Who to contact     If you have questions or need follow up information about today's clinic visit or your schedule please contact [facility] directly at [phone].  Normal or non-critical lab and imaging results will be communicated to you by FTBprohart, letter or phone within 4 business days after the clinic has received the results. If you do not hear from us within 7 days, please contact the clinic through [employer]t or phone. If you have a critical or abnormal lab result, we will notify you by phone as soon as possible.  Submit refill requests through [employer] or call your pharmacy and they will forward the refill request to us. Please allow 3 business days for your refill to be completed.          Additional Information About Your Visit        Mov[employer] Information     [employer] gives you secure access to your electronic health record. If you see a primary care provider, you can also send  "messages to your care team and make appointments. If you have questions, please call your primary care clinic.  If you do not have a primary care provider, please call [phone] and they will assist you.        Care EveryWhere ID     This is your Care EveryWhere ID. This could be used by other organizations to access your [facility] medical records  [id number]        Your Vitals Were     Pulse Temperature Height Pulse Oximetry Breastfeeding? BMI (Body Mass Index)    66 98.8  F (37.1  C) (Oral) 5' 2\" (1.575 m) 100% No 32.37 kg/m2       Blood Pressure from Last 3 Encounters:   01/23/18 110/76   01/20/18 118/78   12/21/17 (!) 134/93    Weight from Last 3 Encounters:   01/23/18 177 lb (80.3 kg)   01/20/18 182 lb (82.6 kg)   07/17/17 182 lb (82.6 kg)              Today, you had the following     No orders found for display       Primary Care Provider Office Phone # Fax #    [facility] [phone] [address]  [place] [address]        Equal Access to Services     [facility] : Hadii aad ku hadasho Sohectorali, waaxda luqadaha, qaybta kaalmada adeegyada, camilo eastonn bessy rutherford . So [facility] [place] [phone].    ATENCIÓN: Si habla español, tiene a acosta disposición servicios gratuitos de asistencia lingüística. Llame al [phone].    We comply with applicable federal civil rights laws and [place] laws. We do not discriminate on the basis of race, color, national origin, age, disability, sex, sexual orientation, or gender identity.            Thank you!     Thank you for choosing [facility]  for your care. Our goal is always to provide you with excellent care. Hearing back from our patients is one way we can continue to improve our services. Please take a few minutes to complete the written survey that you may receive in the mail after your visit with us. Thank you!             Your Updated Medication List - Protect others around you: Learn how to safely use, " store and throw away your medicines at www.disposemymeds.org.          This list is accurate as of: 1/23/18  3:17 PM.  Always use your most recent med list.                   Brand Name Dispense Instructions for use Diagnosis    doxycycline 100 MG capsule    VIBRAMYCIN    20 capsule    Take 1 capsule (100 mg) by mouth 2 times daily for 10 days    Acute sinusitis with symptoms > 10 days, Acute bronchitis, unspecified organism       fluticasone 50 MCG/ACT spray    FLONASE    16 g    Spray 1-2 sprays into both nostrils daily    Acute sinusitis with symptoms > 10 days       levonorgestrel-ethinyl estradiol 0.15-0.03 MG per tablet    SEASONALE    91 tablet    Take 1 tablet by mouth daily Must make appointment for future refills.    PCOS (polycystic ovarian syndrome)

## 2018-01-23 NOTE — TELEPHONE ENCOUNTER
"Patient called,     Feels SOB especially after shoveling yesterday. Coughing but not \"too deep of a cough\". Cough- non productive. No audible wheezing overheard to triage.  Also feels pain underneath bilateral chest after taking a deep breath. Denies heart palpitations or chest pain. Feels body pain but does not believe this is d/t cough. Feels body pain has worsen after shoveling. She is currently at work covering for     Chart reviewed. Seen in  for acute bronchitis and placed on abx. Patient reports no improvement.    Advised patient to be seen in clinic again. If able to be seen in Primary care clinic today or tomorrow (If sx are not worsening). Patient agreed to plan and stated she was trying to do so but was transferred to [facility] because she was seen here. She will schedule through her primary clinic.     Tiana THOMAS, RN, BSN, PHN  [place] [facility]    "

## 2018-01-23 NOTE — NURSING NOTE
"Chief Complaint   Patient presents with     Cough       Initial /76 (BP Location: Left arm, Patient Position: Chair, Cuff Size: Adult Large)  Pulse 66  Temp 98.8  F (37.1  C) (Oral)  Ht 5' 2\" (1.575 m)  Wt 177 lb (80.3 kg)  SpO2 100%  Breastfeeding? No  BMI 32.37 kg/m2 Estimated body mass index is 32.37 kg/(m^2) as calculated from the following:    Height as of this encounter: 5' 2\" (1.575 m).    Weight as of this encounter: 177 lb (80.3 kg).  Medication Reconciliation: complete   Csaba Mlnarik CMA    "

## 2018-01-23 NOTE — PATIENT INSTRUCTIONS
For the muscle aches and pains, can tylenol for the pain.      Costochondritis    Costochondritis is inflammation of a rib or the cartilage that connects a rib to your breastbone (sternum). It causes tenderness, and sometimes chest pain may be sharp or aching, or it may feel like pressure. Pain may get worse with deep breathing, movement, or exercise. In some cases, the pain is mistaken for a heart attack. Despite this, the condition is not serious. Read on to learn more about the condition and how it can be treated.  What causes costochondritis?  The cause of costochondritis is not completely clear, but it may happen after a chest injury, chest infection, or coughing episode. Some physical activities can sometimes lead to costochondritis. Large-breasted women may be more likely to have the condition. Often, the reason for the inflammation is unknown.  Diagnosing costochondritis  There is no test for costochondritis. The condition is diagnosed by the symptoms you have. Your healthcare provider will perform a physical exam. He or she will ask you about your symptoms and examine your chest for tenderness. In some cases, tests are done to rule out more serious problems. These tests may include imaging tests such as chest X-ray, CT scan, or an ECG.  Treating costochondritis  If an underlying cause is found, treatment for that will likely relieve the problem. Costochondritis often goes away on its own. The course of the condition varies from person to person. It usually lasts from weeks to months. In some cases, mild symptoms continue for months to years. To ease symptoms:    Take medicine as directed. These relieve pain and swelling. Ibuprofen or other NSAIDs are often recommended. In some cases, you may be given prescription medicine, such as muscle relaxants.    Avoid activities that put stress on the chest or spine.    Apply a heating pad (set to warm, not too high, heat) to the breastbone several times a  day.    Perform stretching exercises as directed.  Call the healthcare provider right away if you have any of the following:    Pain that is not relieved by medicine    Shortness of breath    Lightheadedness, dizziness, or fainting    Feeling of irregular heartbeat or fast pulse  Anyone with chest pain should see a healthcare provider, especially those who are older and may be at risk for heart disease.   Date Last Reviewed: 10/1/2016    2362-3114 [employer]. [address] [place] [address], [place] [address]. All rights reserved. This information is not intended as a substitute for professional medical care. Always follow your healthcare professional's instructions.

## 2018-01-23 NOTE — PROGRESS NOTES
"  SUBJECTIVE:                                                    Rayray Contreras is a 22 year old female who presents to clinic today for the following health issues:      ED/UC Followup:    Facility: [facility]  Date of visit: 01/20/2018  Reason for visit: cough, chest congestion, back pain, sinus congestion, chronic sinus infections   Chart reviewed. Seen in UC for acute bronchitis and placed on abx. Patient reports no improvement.    The patient does not feel improved since UC visit 3 days ago. She has been taking doxycycline and flonase as prescribed and thinks the doxy is making her nauseated. She has not vomited and denies any loose stools.  The patient is able to eat without vomiting. She continues to have a productive cough and facial congestion. Additionally, the patient has pain below her breasts and upper chest that is worse when she presses on the area or takes a deep breath. She felt short of breath when shoveling yesterday. No fever or chills.        Problem list and histories reviewed & adjusted, as indicated.  Additional history: as documented    ROS:  Constitutional, HEENT, cardiovascular, pulmonary, GI, , musculoskeletal, neuro, skin, endocrine and psych systems are negative, except as otherwise noted.    OBJECTIVE:                                                    /76 (BP Location: Left arm, Patient Position: Chair, Cuff Size: Adult Large)  Pulse 66  Temp 98.8  F (37.1  C) (Oral)  Ht 5' 2\" (1.575 m)  Wt 177 lb (80.3 kg)  SpO2 100%  Breastfeeding? No  BMI 32.37 kg/m2  Body mass index is 32.37 kg/(m^2).  GENERAL: healthy, alert and no distress  HENT: ear canal occluded with cerumen, nose and mouth without ulcers or lesions, maxillary facial tenderness  NECK: no adenopathy, no asymmetry, masses, or scars and thyroid normal to palpation  RESP: lungs clear to auscultation - no rales, rhonchi or wheezes  CV: regular rate and rhythm, normal S1 S2, no S3 or S4, no murmur, " click or rub, no peripheral edema and peripheral pulses strong  MS: pain to palpation over sternum, no gross musculoskeletal defects noted, no edema  LYMPH: no cervical, supraclavicular, or axillary adenopathy    Diagnostic Test Results:  none      ASSESSMENT/PLAN:                                                      There are no diagnoses linked to this encounter.    Acute bronchitis  Costochondritis  Patient has only been on doxycycline for 3 days, so this is too soon to say treatment as failed. Her nausea is mild so recommended she continue the antibiotic course. The patient's pain is likely musculoskeletal due to persistent coughing. Lungs and heart reassuring on exam. Recommended she take tylenol as needed, as patient was told to avoid ibuprofen due to frequent nose bleeds. Patient should follow-up if worsening shortness of breath, chest pain, develops fevers, or no improvement with antibiotic course.     See Patient Instructions    - I have discussed the patient's diagnosis, and my plan of treatment with the patient and/or family. Patient is aware to followup if symptoms do not improve.  Patient has been advised to be seen sooner or seek more immediate care if symptoms change or worsen.  Patient agrees with and understands the plan today.     Kristina MAHONEY acted as a student for me today and accurately reflected my words and actions.    I agree with above History, Review of Systems, Physical exam and Plan.  I have reviewed the content of the documentation and have edited it as needed. I have personally performed the services documented here and the documentation accurately represents those services and the decisions I have made.          Yesi Dumont PA-C    [facility]

## 2018-01-31 ENCOUNTER — OFFICE VISIT (OUTPATIENT)
Dept: FAMILY MEDICINE | Facility: CLINIC | Age: 23
End: 2018-01-31
Payer: COMMERCIAL

## 2018-01-31 VITALS
TEMPERATURE: 99.1 F | DIASTOLIC BLOOD PRESSURE: 80 MMHG | OXYGEN SATURATION: 100 % | BODY MASS INDEX: 32.39 KG/M2 | HEIGHT: 62 IN | HEART RATE: 91 BPM | WEIGHT: 176 LBS | SYSTOLIC BLOOD PRESSURE: 122 MMHG

## 2018-01-31 DIAGNOSIS — R11.0 NAUSEA: Primary | ICD-10-CM

## 2018-01-31 DIAGNOSIS — R10.84 ABDOMINAL PAIN, GENERALIZED: ICD-10-CM

## 2018-01-31 LAB
BASOPHILS # BLD AUTO: 0 10E9/L (ref 0–0.2)
BASOPHILS NFR BLD AUTO: 0.5 %
CRP SERPL-MCNC: <2.9 MG/L (ref 0–8)
DEPRECATED S PYO AG THROAT QL EIA: NORMAL
DIFFERENTIAL METHOD BLD: NORMAL
EOSINOPHIL # BLD AUTO: 0.1 10E9/L (ref 0–0.7)
EOSINOPHIL NFR BLD AUTO: 1.1 %
ERYTHROCYTE [DISTWIDTH] IN BLOOD BY AUTOMATED COUNT: 11.9 % (ref 10–15)
ERYTHROCYTE [SEDIMENTATION RATE] IN BLOOD BY WESTERGREN METHOD: 11 MM/H (ref 0–20)
HCT VFR BLD AUTO: 42.5 % (ref 35–47)
HETEROPH AB SER QL: NEGATIVE
HGB BLD-MCNC: 14.3 G/DL (ref 11.7–15.7)
LIPASE SERPL-CCNC: 135 U/L (ref 73–393)
LYMPHOCYTES # BLD AUTO: 1.8 10E9/L (ref 0.8–5.3)
LYMPHOCYTES NFR BLD AUTO: 21.8 %
MCH RBC QN AUTO: 32.1 PG (ref 26.5–33)
MCHC RBC AUTO-ENTMCNC: 33.6 G/DL (ref 31.5–36.5)
MCV RBC AUTO: 95 FL (ref 78–100)
MONOCYTES # BLD AUTO: 0.8 10E9/L (ref 0–1.3)
MONOCYTES NFR BLD AUTO: 9.4 %
NEUTROPHILS # BLD AUTO: 5.6 10E9/L (ref 1.6–8.3)
NEUTROPHILS NFR BLD AUTO: 67.2 %
PLATELET # BLD AUTO: 285 10E9/L (ref 150–450)
RBC # BLD AUTO: 4.46 10E12/L (ref 3.8–5.2)
SPECIMEN SOURCE: NORMAL
WBC # BLD AUTO: 8.3 10E9/L (ref 4–11)

## 2018-01-31 PROCEDURE — 86308 HETEROPHILE ANTIBODY SCREEN: CPT | Performed by: PHYSICIAN ASSISTANT

## 2018-01-31 PROCEDURE — 80076 HEPATIC FUNCTION PANEL: CPT | Performed by: PHYSICIAN ASSISTANT

## 2018-01-31 PROCEDURE — 86140 C-REACTIVE PROTEIN: CPT | Performed by: PHYSICIAN ASSISTANT

## 2018-01-31 PROCEDURE — 99213 OFFICE O/P EST LOW 20 MIN: CPT | Performed by: PHYSICIAN ASSISTANT

## 2018-01-31 PROCEDURE — 87081 CULTURE SCREEN ONLY: CPT | Performed by: PHYSICIAN ASSISTANT

## 2018-01-31 PROCEDURE — 36415 COLL VENOUS BLD VENIPUNCTURE: CPT | Performed by: PHYSICIAN ASSISTANT

## 2018-01-31 PROCEDURE — 85025 COMPLETE CBC W/AUTO DIFF WBC: CPT | Performed by: PHYSICIAN ASSISTANT

## 2018-01-31 PROCEDURE — 85652 RBC SED RATE AUTOMATED: CPT | Performed by: PHYSICIAN ASSISTANT

## 2018-01-31 PROCEDURE — 87880 STREP A ASSAY W/OPTIC: CPT | Performed by: PHYSICIAN ASSISTANT

## 2018-01-31 PROCEDURE — 83690 ASSAY OF LIPASE: CPT | Performed by: PHYSICIAN ASSISTANT

## 2018-01-31 NOTE — MR AVS SNAPSHOT
"              After Visit Summary   1/31/2018    Rayray Contreras    MRN: [id number]           Patient Information     Date Of Birth          1995        Visit Information        Provider Department      1/31/2018 10:20 AM Yesi Dumont PA-C [facility]        Today's Diagnoses     Nausea    -  1    Abdominal pain, generalized           Follow-ups after your visit        Who to contact     If you have questions or need follow up information about today's clinic visit or your schedule please contact [facility] directly at [phone].  Normal or non-critical lab and imaging results will be communicated to you by [employer], letter or phone within 4 business days after the clinic has received the results. If you do not hear from us within 7 days, please contact the clinic through [employer] or phone. If you have a critical or abnormal lab result, we will notify you by phone as soon as possible.  Submit refill requests through [employer] or call your pharmacy and they will forward the refill request to us. Please allow 3 business days for your refill to be completed.          Additional Information About Your Visit        MyChart Information     Retrieve gives you secure access to your electronic health record. If you see a primary care provider, you can also send messages to your care team and make appointments. If you have questions, please call your primary care clinic.  If you do not have a primary care provider, please call [phone] and they will assist you.        Care EveryWhere ID     This is your Care EveryWhere ID. This could be used by other organizations to access your [facility] medical records  [id number]        Your Vitals Were     Pulse Temperature Height Pulse Oximetry Breastfeeding? BMI (Body Mass Index)    91 99.1  F (37.3  C) (Oral) 5' 2\" (1.575 m) 100% No 32.19 kg/m2       Blood Pressure from Last 3 Encounters:   01/31/18 122/80   01/23/18 110/76   01/20/18 " 118/78    Weight from Last 3 Encounters:   01/31/18 176 lb (79.8 kg)   01/23/18 177 lb (80.3 kg)   01/20/18 182 lb (82.6 kg)              We Performed the Following     Beta strep group A culture     CBC with platelets differential     CRP, inflammation     ESR: Erythrocyte sedimentation rate     Hepatic panel (Albumin, ALT, AST, Bili, Alk Phos, TP)     Lipase     Mononucleosis screen     Strep, Rapid Screen        Primary Care Provider Office Phone # Fax #    [facility] [phone] [address]  [place] [address]        Equal Access to Services     [facility] : Hadii uzair maynard hadasho Soomaali, waaxda luqadaha, qaybta kaalmada [place], camilo rutherford . So [facility] [phone].    ATENCIÓN: Si habla español, tiene a acosta disposición servicios gratuitos de asistencia lingüística. LlTri[facility] [phone].    We comply with applicable federal civil rights laws and [place] laws. We do not discriminate on the basis of race, color, national origin, age, disability, sex, sexual orientation, or gender identity.            Thank you!     Thank you for choosing [facility]  for your care. Our goal is always to provide you with excellent care. Hearing back from our patients is one way we can continue to improve our services. Please take a few minutes to complete the written survey that you may receive in the mail after your visit with us. Thank you!             Your Updated Medication List - Protect others around you: Learn how to safely use, store and throw away your medicines at www.disposemymeds.org.          This list is accurate as of 1/31/18 11:44 AM.  Always use your most recent med list.                   Brand Name Dispense Instructions for use Diagnosis    fluticasone 50 MCG/ACT spray    FLONASE    16 g    Spray 1-2 sprays into both nostrils daily    Acute sinusitis with symptoms > 10 days       levonorgestrel-ethinyl estradiol 0.15-0.03 MG per  tablet    SEASONALE    91 tablet    Take 1 tablet by mouth daily Must make appointment for future refills.    PCOS (polycystic ovarian syndrome)

## 2018-01-31 NOTE — NURSING NOTE
"Chief Complaint   Patient presents with     Cough       Initial /80 (BP Location: Right arm, Patient Position: Chair, Cuff Size: Adult Large)  Pulse 91  Temp 99.1  F (37.3  C) (Oral)  Ht 5' 2\" (1.575 m)  Wt 176 lb (79.8 kg)  SpO2 100%  Breastfeeding? No  BMI 32.19 kg/m2 Estimated body mass index is 32.19 kg/(m^2) as calculated from the following:    Height as of this encounter: 5' 2\" (1.575 m).    Weight as of this encounter: 176 lb (79.8 kg).  Medication Reconciliation: complete   Csaba Mlnarik CMA    "

## 2018-01-31 NOTE — LETTER
[facility]  [address] [place] [address]                                                                                                       [phone]    January 31, 2018    Rayray Contreras  [address]  [place] [address]      To Whom it May Concern:    The above patient is unable to attend work for 01.30.2018-01.31.2018 due to a medical issue. Please contact me with questions or concerns.      Sincerely,      Yesi Dumont PA-C

## 2018-01-31 NOTE — PROGRESS NOTES
"  SUBJECTIVE:                                                    Rayray Contreras is a 22 year old female who presents to clinic today for the following health issues:    The patient was seen in  on 1/20/18 and started on Doxycycline for acute bronchtitis. She was then seen in clinic on 1/23/18 as symptoms had not improved. She was thought to have costochondritis due to persistent coughing. The patient had mild nausea at this time, which she associated to the Doxycycline.     Currently, the patient presents with worsening nausea. She has been able to eat (oatmeal and [employer] nuggets yesterday) without vomiting but feels nauseated throughout the entire day. The patient had a fever of 102F on Monday, normal Tuesday, and 101F this AM. She had a loose stool this morning but was not watery or with blood. The patient continues to have a cough and facial pain. Her cough is dry and slightly improved. She feels fatigued but denies body aches or urinary symptoms. The patent's mother and sister have both recently had similar symptoms and recovering from the \"stomach flu\".     Problem list and histories reviewed & adjusted, as indicated.  Additional history: as documented    ROS:  Constitutional, HEENT, cardiovascular, pulmonary, GI, , musculoskeletal, neuro, skin, endocrine and psych systems are negative, except as otherwise noted.    OBJECTIVE:                                                    /80 (BP Location: Right arm, Patient Position: Chair, Cuff Size: Adult Large)  Pulse 91  Temp 99.1  F (37.3  C) (Oral)  Ht 5' 2\" (1.575 m)  Wt 176 lb (79.8 kg)  SpO2 100%  Breastfeeding? No  BMI 32.19 kg/m2  Body mass index is 32.19 kg/(m^2).  GENERAL: healthy, alert and no distress  HENT: ear canals and TM's normal, nose and mouth without ulcers or lesions  NECK: no adenopathy, no asymmetry, masses, or scars and thyroid normal to palpation  RESP: lungs clear to auscultation - no rales, rhonchi or " wheezes  CV: regular rate and rhythm, normal S1 S2, no S3 or S4, no murmur, click or rub, no peripheral edema and peripheral pulses strong  ABDOMEN: soft, diffuse tenderness to palpation mostly in lower quadrants, no hepatosplenomegaly, no masses and bowel sounds normal  MS: no gross musculoskeletal defects noted, no edema  BACK: no CVA tenderness, no paralumbar tenderness    Diagnostic Test Results:  Results for orders placed or performed in visit on 01/31/18 (from the past 24 hour(s))   CBC with platelets differential   Result Value Ref Range    WBC 8.3 4.0 - 11.0 10e9/L    RBC Count 4.46 3.8 - 5.2 10e12/L    Hemoglobin 14.3 11.7 - 15.7 g/dL    Hematocrit 42.5 35.0 - 47.0 %    MCV 95 78 - 100 fl    MCH 32.1 26.5 - 33.0 pg    MCHC 33.6 31.5 - 36.5 g/dL    RDW 11.9 10.0 - 15.0 %    Platelet Count 285 150 - 450 10e9/L    Diff Method Automated Method     % Neutrophils 67.2 %    % Lymphocytes 21.8 %    % Monocytes 9.4 %    % Eosinophils 1.1 %    % Basophils 0.5 %    Absolute Neutrophil 5.6 1.6 - 8.3 10e9/L    Absolute Lymphocytes 1.8 0.8 - 5.3 10e9/L    Absolute Monocytes 0.8 0.0 - 1.3 10e9/L    Absolute Eosinophils 0.1 0.0 - 0.7 10e9/L    Absolute Basophils 0.0 0.0 - 0.2 10e9/L   ESR: Erythrocyte sedimentation rate   Result Value Ref Range    Sed Rate 11 0 - 20 mm/h   Mononucleosis screen   Result Value Ref Range    Mononucleosis Screen Negative NEG^Negative   Strep, Rapid Screen   Result Value Ref Range    Specimen Description Throat     Rapid Strep A Screen       NEGATIVE: No Group A streptococcal antigen detected by immunoassay, await culture report.        ASSESSMENT/PLAN:                                                      Rayray was seen today for cough.    Diagnoses and all orders for this visit:    Abdominal pain, generalized  Nausea  Patient likely has a mild gastroenteritis due to recent exposure. She has persistent nausea, although is able to eat without problem. Exam findings today are unremarkable and  she does not have evidence of an acute abdomen.  The CBC and ESR normal. Strep and Mono also negative. Advised patient to rest, stay hydrated and monitor symptoms. She should follow-up if unable to eat/drink , vomiting, or fever persists. Otherwise expect patient's symptoms should resolve with time. Will await hepatic panel and lipase and update patient on results.   -     Strep, Rapid Screen  -     Lipase  -     CBC with platelets differential  -     Hepatic panel (Albumin, ALT, AST, Bili, Alk Phos, TP)  -     ESR: Erythrocyte sedimentation rate  -     CRP, inflammation  -     Mononucleosis screen  -     Beta strep group A culture    See Patient Instructions    -- I have discussed the patient's diagnosis, and my plan of treatment with the patient and/or family. Patient is aware to followup if symptoms do not improve.  Patient has been advised to be seen sooner or seek more immediate care if symptoms change or worsen.  Patient agrees with and understands the plan today.     Kristina MAHOENY acted as a student for me today and accurately reflected my words and actions.    I agree with above History, Review of Systems, Physical exam and Plan.  I have reviewed the content of the documentation and have edited it as needed. I have personally performed the services documented here and the documentation accurately represents those services and the decisions I have made.            Yesi Dumont PA-C    [facility]

## 2018-02-01 LAB
ALBUMIN SERPL-MCNC: 4.5 G/DL (ref 3.4–5)
ALP SERPL-CCNC: 33 U/L (ref 40–150)
ALT SERPL W P-5'-P-CCNC: 120 U/L (ref 0–50)
AST SERPL W P-5'-P-CCNC: 47 U/L (ref 0–45)
BACTERIA SPEC CULT: NORMAL
BILIRUB DIRECT SERPL-MCNC: 0.2 MG/DL (ref 0–0.2)
BILIRUB SERPL-MCNC: 0.6 MG/DL (ref 0.2–1.3)
PROT SERPL-MCNC: 8.5 G/DL (ref 6.8–8.8)
SPECIMEN SOURCE: NORMAL

## 2018-02-05 DIAGNOSIS — R74.8 ELEVATED LIVER ENZYMES: Primary | ICD-10-CM

## 2018-02-06 ENCOUNTER — OFFICE VISIT (OUTPATIENT)
Dept: FAMILY MEDICINE | Facility: CLINIC | Age: 23
End: 2018-02-06
Payer: COMMERCIAL

## 2018-02-06 VITALS
HEART RATE: 85 BPM | OXYGEN SATURATION: 98 % | DIASTOLIC BLOOD PRESSURE: 82 MMHG | BODY MASS INDEX: 32.59 KG/M2 | TEMPERATURE: 96.9 F | SYSTOLIC BLOOD PRESSURE: 118 MMHG | HEIGHT: 62 IN | WEIGHT: 177.13 LBS

## 2018-02-06 DIAGNOSIS — R19.7 DIARRHEA, UNSPECIFIED TYPE: ICD-10-CM

## 2018-02-06 DIAGNOSIS — R79.89 ELEVATED LFTS: ICD-10-CM

## 2018-02-06 DIAGNOSIS — R82.998 DARK BROWN URINE: ICD-10-CM

## 2018-02-06 DIAGNOSIS — R11.0 NAUSEA: ICD-10-CM

## 2018-02-06 DIAGNOSIS — Z32.00 PREGNANCY EXAMINATION OR TEST, PREGNANCY UNCONFIRMED: Primary | ICD-10-CM

## 2018-02-06 LAB
ALBUMIN UR-MCNC: NEGATIVE MG/DL
APPEARANCE UR: ABNORMAL
BACTERIA #/AREA URNS HPF: ABNORMAL /HPF
BETA HCG QUAL IFA URINE: NEGATIVE
BILIRUB UR QL STRIP: NEGATIVE
COLOR UR AUTO: YELLOW
FLUAV+FLUBV AG SPEC QL: NEGATIVE
FLUAV+FLUBV AG SPEC QL: NEGATIVE
GLUCOSE UR STRIP-MCNC: NEGATIVE MG/DL
HAV IGG SER QL IA: REACTIVE
HAV IGM SERPL QL IA: NONREACTIVE
HBV CORE AB SERPL QL IA: NONREACTIVE
HBV SURFACE AB SERPL IA-ACNC: 17.12 M[IU]/ML
HBV SURFACE AG SERPL QL IA: NONREACTIVE
HGB UR QL STRIP: ABNORMAL
KETONES UR STRIP-MCNC: NEGATIVE MG/DL
LEUKOCYTE ESTERASE UR QL STRIP: NEGATIVE
LIPASE SERPL-CCNC: 135 U/L (ref 73–393)
NITRATE UR QL: NEGATIVE
NON-SQ EPI CELLS #/AREA URNS LPF: ABNORMAL /LPF
PH UR STRIP: 6.5 PH (ref 5–7)
RBC #/AREA URNS AUTO: ABNORMAL /HPF
SOURCE: ABNORMAL
SP GR UR STRIP: 1.02 (ref 1–1.03)
SPECIMEN SOURCE: NORMAL
UROBILINOGEN UR STRIP-ACNC: 0.2 EU/DL (ref 0.2–1)
WBC #/AREA URNS AUTO: ABNORMAL /HPF

## 2018-02-06 PROCEDURE — 80076 HEPATIC FUNCTION PANEL: CPT | Performed by: PHYSICIAN ASSISTANT

## 2018-02-06 PROCEDURE — 86665 EPSTEIN-BARR CAPSID VCA: CPT | Mod: 59 | Performed by: PHYSICIAN ASSISTANT

## 2018-02-06 PROCEDURE — 84703 CHORIONIC GONADOTROPIN ASSAY: CPT | Performed by: PHYSICIAN ASSISTANT

## 2018-02-06 PROCEDURE — 86706 HEP B SURFACE ANTIBODY: CPT | Performed by: PHYSICIAN ASSISTANT

## 2018-02-06 PROCEDURE — 82977 ASSAY OF GGT: CPT | Performed by: PHYSICIAN ASSISTANT

## 2018-02-06 PROCEDURE — 86704 HEP B CORE ANTIBODY TOTAL: CPT | Performed by: PHYSICIAN ASSISTANT

## 2018-02-06 PROCEDURE — 86645 CMV ANTIBODY IGM: CPT | Performed by: PHYSICIAN ASSISTANT

## 2018-02-06 PROCEDURE — 36415 COLL VENOUS BLD VENIPUNCTURE: CPT | Performed by: PHYSICIAN ASSISTANT

## 2018-02-06 PROCEDURE — 81001 URINALYSIS AUTO W/SCOPE: CPT | Performed by: PHYSICIAN ASSISTANT

## 2018-02-06 PROCEDURE — 83690 ASSAY OF LIPASE: CPT | Performed by: PHYSICIAN ASSISTANT

## 2018-02-06 PROCEDURE — 86803 HEPATITIS C AB TEST: CPT | Performed by: PHYSICIAN ASSISTANT

## 2018-02-06 PROCEDURE — 86708 HEPATITIS A ANTIBODY: CPT | Performed by: PHYSICIAN ASSISTANT

## 2018-02-06 PROCEDURE — 86665 EPSTEIN-BARR CAPSID VCA: CPT | Performed by: PHYSICIAN ASSISTANT

## 2018-02-06 PROCEDURE — 87804 INFLUENZA ASSAY W/OPTIC: CPT | Performed by: PHYSICIAN ASSISTANT

## 2018-02-06 PROCEDURE — 86644 CMV ANTIBODY: CPT | Performed by: PHYSICIAN ASSISTANT

## 2018-02-06 PROCEDURE — 87340 HEPATITIS B SURFACE AG IA: CPT | Performed by: PHYSICIAN ASSISTANT

## 2018-02-06 PROCEDURE — 99214 OFFICE O/P EST MOD 30 MIN: CPT | Performed by: PHYSICIAN ASSISTANT

## 2018-02-06 PROCEDURE — 80048 BASIC METABOLIC PNL TOTAL CA: CPT | Performed by: PHYSICIAN ASSISTANT

## 2018-02-06 PROCEDURE — 86709 HEPATITIS A IGM ANTIBODY: CPT | Performed by: PHYSICIAN ASSISTANT

## 2018-02-06 ASSESSMENT — ANXIETY QUESTIONNAIRES
5. BEING SO RESTLESS THAT IT IS HARD TO SIT STILL: NOT AT ALL
3. WORRYING TOO MUCH ABOUT DIFFERENT THINGS: SEVERAL DAYS
1. FEELING NERVOUS, ANXIOUS, OR ON EDGE: SEVERAL DAYS
GAD7 TOTAL SCORE: 3
2. NOT BEING ABLE TO STOP OR CONTROL WORRYING: NOT AT ALL
IF YOU CHECKED OFF ANY PROBLEMS ON THIS QUESTIONNAIRE, HOW DIFFICULT HAVE THESE PROBLEMS MADE IT FOR YOU TO DO YOUR WORK, TAKE CARE OF THINGS AT HOME, OR GET ALONG WITH OTHER PEOPLE: NOT DIFFICULT AT ALL
6. BECOMING EASILY ANNOYED OR IRRITABLE: SEVERAL DAYS
7. FEELING AFRAID AS IF SOMETHING AWFUL MIGHT HAPPEN: NOT AT ALL

## 2018-02-06 ASSESSMENT — PATIENT HEALTH QUESTIONNAIRE - PHQ9: 5. POOR APPETITE OR OVEREATING: NOT AT ALL

## 2018-02-06 NOTE — NURSING NOTE
"Chief Complaint   Patient presents with     Nausea     Nausea ~18 days. Wants pregnancy test. LMP unknown. Diarrhea ~1 week.        Initial /82  Pulse 85  Temp 96.9  F (36.1  C) (Tympanic)  Ht 5' 2\" (1.575 m)  Wt 177 lb 2 oz (80.3 kg)  LMP  (LMP Unknown)  SpO2 98%  BMI 32.4 kg/m2 Estimated body mass index is 32.4 kg/(m^2) as calculated from the following:    Height as of this encounter: 5' 2\" (1.575 m).    Weight as of this encounter: 177 lb 2 oz (80.3 kg).  Medication Reconciliation: complete    "

## 2018-02-06 NOTE — PROGRESS NOTES
SUBJECTIVE:   Rayray Contreras is a 22 year old female who presents to clinic today for the following health issues:    Nausea  Rayray presents to clinic reporting symptoms of nausea that have been ongoing for approximately 2-3 weeks. She has been seen by Yesi Dumont on 01/23 and 01/31 and had a negative workup for strep, rapid mono, CBC, lipase, and CRP. She did have slightly elevated LFT and she denies significant alcohol intake. She reports that since the nausea onset it has not fluctuated in severity and she has not had any occurrences of emesis. She reports frequent dry heaving. She has been treating her symptoms with sprite and gingerale but has been unsuccessful. In addition to the nausea she reports symptoms of diarrhea that onset 1 week ago, stomach and back discomfort, and upper abdominal/chest discomfort. Her symptoms seem to be aggravated when laying down and reports sharp stabbing pains in her stomach and back. She had fevers measured at home last week but has been afebrile for at least one week. She states that she has been having normal urination but that it might be more cloudy than normal. She denies dark or cola colored urine. She also denies abnormal colored stools but they have been loose. 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 She still has both her gallbladder and appendix.     Liver Function Studies -   Recent Labs   Lab Test  01/31/18   1110   PROTTOTAL  8.5   ALBUMIN  4.5   BILITOTAL  0.6   ALKPHOS  33*   AST  47*   ALT  120*     Problem list and histories reviewed & adjusted, as indicated.  Additional history: as documented    Patient Active Problem List   Diagnosis     Developmental Delay     Muscle cramp     Obesity     PCOS (polycystic ovarian syndrome)     Major depressive disorder, single episode, mild (H)     Health Care Home     Concussion without loss of consciousness     Past Surgical History:   Procedure  Laterality Date     NO HISTORY OF SURGERY         Social History   Substance Use Topics     Smoking status: Never Smoker     Smokeless tobacco: Never Used     Alcohol use No      Comment: once every few months      Family History   Problem Relation Age of Onset     Arthritis Mother      DIABETES Maternal Grandmother      Colon Cancer Maternal Grandmother      passed from - around 80 years old     Arthritis Maternal Grandmother      DIABETES Maternal Grandfather          Current Outpatient Prescriptions   Medication Sig Dispense Refill     lidocaine (viscous) 15 mL, alum & mag hydroxide-simethicone 15 mL GI Cocktail Take 30 mLs by mouth once for 1 dose 30 mL 0     omeprazole (PRILOSEC) 20 MG CR capsule Take 1 capsule (20 mg) by mouth 2 times daily (before meals) for 14 days 28 capsule 0     fluticasone (FLONASE) 50 MCG/ACT spray Spray 1-2 sprays into both nostrils daily 16 g 0     levonorgestrel-ethinyl estradiol (SEASONALE) 0.15-0.03 MG per tablet Take 1 tablet by mouth daily Must make appointment for future refills. 91 tablet 2     Allergies   Allergen Reactions     Chicken-Derived Products (Egg)      Other reaction(s): GI Upset     Lac Bovis      Other reaction(s): GI Upset       Reviewed and updated as needed this visit by clinical staff  Tobacco  Allergies  Meds  Med Hx  Surg Hx  Fam Hx  Soc Hx      Reviewed and updated as needed this visit by Provider  Tobacco  Med Hx  Surg Hx  Fam Hx  Soc Hx        ROS:  Constitutional, HEENT, cardiovascular, pulmonary, GI, , musculoskeletal, neuro, skin, endocrine and psych systems are negative, except as otherwise noted.    This document serves as a record of the services and decisions personally performed and made by Jennifer Aguilar PA-C. It was created on her behalf by Juvenal Robles, a trained medical scribe. The creation of this document is based on the provider's statements to the medical scribe.  Juvenal Robles 10:22 AM February 6,  "2018    OBJECTIVE:   /82  Pulse 85  Temp 96.9  F (36.1  C) (Tympanic)  Ht 5' 2\" (1.575 m)  Wt 177 lb 2 oz (80.3 kg)  LMP  (LMP Unknown)  SpO2 98%  BMI 32.4 kg/m2  Body mass index is 32.4 kg/(m^2).  GENERAL: healthy, alert and no distress  HENT: partial cerumen impaction bilaterally, otherwise ear canals and TM's normal, nose and mouth without ulcers or lesions  NECK: no adenopathy, no asymmetry, masses, or scars and thyroid normal to palpation  RESP: lungs clear to auscultation - no rales, rhonchi or wheezes  CV: regular rate and rhythm, normal S1 S2, no S3 or S4, no murmur, click or rub, no peripheral edema and peripheral pulses strong  ABDOMEN: epigastric and LLQ abdominal tenderness, otherwise soft, nontender, no hepatosplenomegaly, no masses and bowel sounds normal  SKIN: no suspicious lesions or rashes   PSYCH: mentation appears normal, affect normal/bright     Diagnostic Test Results:  Results for orders placed or performed in visit on 02/06/18 (from the past 24 hour(s))   Influenza A/B antigen   Result Value Ref Range    Influenza A/B Agn Specimen Nasopharyngeal     Influenza A Negative NEG^Negative    Influenza B Negative NEG^Negative   Beta HCG qual IFA urine - [facility] [place]   Result Value Ref Range    Beta HCG Qual IFA Urine Negative NEG^Negative      *UA reflex to Microscopic and Culture ([place] and [facility] (except [place] and [place])   Result Value Ref Range    Color Urine Yellow     Appearance Urine Slightly Cloudy     Glucose Urine Negative NEG^Negative mg/dL    Bilirubin Urine Negative NEG^Negative    Ketones Urine Negative NEG^Negative mg/dL    Specific Gravity Urine 1.020 1.003 - 1.035    Blood Urine Small (A) NEG^Negative    pH Urine 6.5 5.0 - 7.0 pH    Protein Albumin Urine Negative NEG^Negative mg/dL    Urobilinogen Urine 0.2 0.2 - 1.0 EU/dL    Nitrite Urine Negative NEG^Negative    Leukocyte Esterase Urine Negative NEG^Negative    Source Midstream Urine    Urine " Microscopic   Result Value Ref Range    WBC Urine O - 2 OTO2^O - 2 /HPF    RBC Urine 2-5 (A) OTO2^O - 2 /HPF    Squamous Epithelial /LPF Urine Many (A) FEW^Few /LPF    Bacteria Urine Few (A) NEG^Negative /HPF       ASSESSMENT/PLAN:   Rayray was seen today for nausea.    Diagnoses and all orders for this visit:    Pregnancy examination or test, pregnancy unconfirmed  Urine pregnancy screen negative.  -     Beta HCG qual IFA urine - [facility] and [place]    Nausea, Elevated LFTs, Diarrhea, unspecified type, Dark brown urine  GI cocktail given at clinic, patient did not report improvement of symptoms. Start omeprazole in an attempt to treat nausea and abdominal discomfort. Discussed with patient her previous labs indicated elevated LFTs and provided possible explanations. Will provide further workup for further diagnostics, patient voiced agreement. Will monitor labs and update patient with results as they become available. Abdominal CT ordered for further diagnostic imaging. Follow up as needed.  -     Influenza A/B antigen  -     lidocaine (viscous) 15 mL, alum & mag hydroxide-simethicone 15 mL GI Cocktail; Take 30 mLs by mouth once for 1 dose  -     Basic metabolic panel  (Ca, Cl, CO2, Creat, Gluc, K, Na, BUN)  -     CT Abdomen Pelvis w Contrast; Future  -     omeprazole (PRILOSEC) 20 MG CR capsule; Take 1 capsule (20 mg) by mouth 2 times daily (before meals) for 14 days  -     Hepatic panel (Albumin, ALT, AST, Bili, Alk Phos, TP)  -     Lipase  -     GGT  -     EBV Capsid Antibody IgG  -     EBV Capsid Antibody IgM  -     CMV Antibody IgG  -     CMV antibody IgM  -     Hepatitis A Antibody IgG  -     Hepatitis B Surface Antibody  -     Hepatitis B core antibody  -     Hepatitis B surface antigen  -     Hepatitis C Screen Reflex to HCV RNA Quant and Genotype  -     Hepatitis A antibody IgM  -     CT Abdomen Pelvis w Contrast; Future  -     *UA reflex to Microscopic and Culture ([place] and [facility] (except  [place]    The information in this document, created by the medical scribe for me, accurately reflects the services I personally performed and the decisions made by me. I have reviewed and approved this document for accuracy prior to leaving the patient care area.  February 6, 2018 10:22 AM    Jennifer Aguilar PA-C  [facility] [place]

## 2018-02-06 NOTE — PROGRESS NOTES
Note to staff: Please call the patient to explain results.    The results from your recent lab work are within normal limits.    -All of your labs are normal, but the liver numbers are mildly elevated.  I recommend that we have this re-checked in one month.  Please make a lab only appointment to have this lab re-checked in about 4 weeks.      Please followup in clinic for an office visit sooner if symptoms are not improving, or if they are changed or worsened in any way.        Thank you for choosing [facility] for your health care needs,      Yesi Dumont PA-C

## 2018-02-06 NOTE — MR AVS SNAPSHOT
After Visit Summary   2/6/2018    Rayray Contreras    MRN: [id number]           Patient Information     Date Of Birth          1995        Visit Information        Provider Department      2/6/2018 10:20 AM Jennifer Aguilar PA-C [facility]        Today's Diagnoses     Pregnancy examination or test, pregnancy unconfirmed    -  1    Nausea        Elevated LFTs        Diarrhea, unspecified type        Dark brown urine           Follow-ups after your visit        Your next 10 appointments already scheduled     Feb 07, 2018  1:30 PM CST   (Arrive by 1:15 PM)   CT ABDOMEN PELVIS W CONTRAST with [id number]   [facility] ([facility])    [address]  [place] [address] [phone]           Please bring any scans or X-rays taken at other hospitals, if similar tests were done. Also bring a list of your medicines, including vitamins, minerals and over-the-counter drugs. It is safest to leave personal items at home.  Be sure to tell your doctor:   If you have any allergies.   If there s any chance you are pregnant.   If you are breastfeeding.   If you have any special needs.  You may have contrast for this exam. To prepare:   Do not eat or drink for 2 hours before your exam. If you need to take medicine, you may take it with small sips of water. (We may ask you to take liquid medicine as well.)   The day before your exam, drink extra fluids at least six 8-ounce glasses (unless your doctor tells you to restrict your fluids).  Patients over 70 or patients with diabetes or kidney problems:   If you haven t had a blood test (creatinine test) within the last 30 days, go to your clinic or Diagnostic Imaging Department for this test.  If you have diabetes:   If your kidney function is normal, continue taking your metformin (Avandamet, Glucophage, Glucovance, Metaglip) on the day of your exam.   If your kidney function is  abnormal, wait 48 hours before restarting this medicine.  You will have oral contrast for this exam:   You will drink the contrast at home. Get this from your clinic or Diagnostic Imaging Department. Please follow the directions given.  Please wear loose clothing, such as a sweat suit or jogging clothes. Avoid snaps, zippers and other metal. We may ask you to undress and put on a hospital gown.  If you have any questions, please call the Imaging Department where you will have your exam.              Future tests that were ordered for you today     Open Future Orders        Priority Expected Expires Ordered    CT Abdomen Pelvis w Contrast Routine  2/6/2019 2/6/2018    Comprehensive metabolic panel Routine  2/5/2019 2/5/2018            Who to contact     If you have questions or need follow up information about today's clinic visit or your schedule please contact [facility] directly at [phone].  Normal or non-critical lab and imaging results will be communicated to you by MyChart, letter or phone within 4 business days after the clinic has received the results. If you do not hear from us within 7 days, please contact the clinic through [employer] or phone. If you have a critical or abnormal lab result, we will notify you by phone as soon as possible.  Submit refill requests through [employer] or call your pharmacy and they will forward the refill request to us. Please allow 3 business days for your refill to be completed.          Additional Information About Your Visit        [employer] Information     [employer] gives you secure access to your electronic health record. If you see a primary care provider, you can also send messages to your care team and make appointments. If you have questions, please call your primary care clinic.  If you do not have a primary care provider, please call [phone] and they will assist you.        Care EveryWhere ID     This is your Care EveryWhere ID. This could be used  "by other organizations to access your [facility] medical records  [id number]        Your Vitals Were     Pulse Temperature Height Last Period Pulse Oximetry BMI (Body Mass Index)    85 96.9  F (36.1  C) (Tympanic) 5' 2\" (1.575 m) (LMP Unknown) 98% 32.4 kg/m2       Blood Pressure from Last 3 Encounters:   02/06/18 118/82   01/31/18 122/80   01/23/18 110/76    Weight from Last 3 Encounters:   02/06/18 177 lb 2 oz (80.3 kg)   01/31/18 176 lb (79.8 kg)   01/23/18 177 lb (80.3 kg)              We Performed the Following     *UA reflex to Microscopic and Culture ([place] and [facility] (except [place] and [place])     Basic metabolic panel  (Ca, Cl, CO2, Creat, Gluc, K, Na, BUN)     Beta HCG qual IFA urine - [facility] and [place]     CMV Antibody IgG     CMV antibody IgM     EBV Capsid Antibody IgG     EBV Capsid Antibody IgM     GGT     Hepatic panel (Albumin, ALT, AST, Bili, Alk Phos, TP)     Hepatitis A Antibody IgG     Hepatitis A antibody IgM     Hepatitis B core antibody     Hepatitis B Surface Antibody     Hepatitis B surface antigen     Hepatitis C Screen Reflex to HCV RNA Quant and Genotype     Influenza A/B antigen     Lipase     Urine Microscopic          Today's Medication Changes          These changes are accurate as of 2/6/18 12:43 PM.  If you have any questions, ask your nurse or doctor.               Start taking these medicines.        Dose/Directions    lidocaine (viscous) 15 mL, alum & mag hydroxide-simethicone 15 mL GI Cocktail   Used for:  Nausea   Started by:  Jennifer Aguilar PA-C        Dose:  30 mL   Take 30 mLs by mouth once for 1 dose   Quantity:  30 mL   Refills:  0       omeprazole 20 MG CR capsule   Commonly known as:  priLOSEC   Used for:  Nausea   Started by:  Jennifer Aguilar PA-C        Dose:  20 mg   Take 1 capsule (20 mg) by mouth 2 times daily (before meals) for 14 days   Quantity:  28 capsule   Refills:  0            Where to get your medicines      These medications " were sent to [id number] - [place], [place] - [address] [place] [address] [address] [facility] [address], [place] [address]    Hours:  24-hours Phone:  [phone]     omeprazole 20 MG CR capsule         Some of these will need a paper prescription and others can be bought over the counter.  Ask your nurse if you have questions.     You don't need a prescription for these medications     lidocaine (viscous) 15 mL, alum & mag hydroxide-simethicone 15 mL GI Cocktail                Primary Care Provider Office Phone # Fax #    [facility] [phone] [address] [id number]        Equal Access to Services     GONZALO BAUTISTA : Hadii uzair maynard hadasho Sohectorali, waaxda luqadaha, qaybta kaalmada adeegyada, camilo pacheco. So [facility] [phone].    ATENCIÓN: Si habla español, tiene a acosta disposición servicios gratuitos de asistencia lingüística. [facility] [phone].    We comply with applicable federal civil rights laws and [place] laws. We do not discriminate on the basis of race, color, national origin, age, disability, sex, sexual orientation, or gender identity.            Thank you!     Thank you for choosing [facility]  for your care. Our goal is always to provide you with excellent care. Hearing back from our patients is one way we can continue to improve our services. Please take a few minutes to complete the written survey that you may receive in the mail after your visit with us. Thank you!             Your Updated Medication List - Protect others around you: Learn how to safely use, store and throw away your medicines at www.disposemymeds.org.          This list is accurate as of 2/6/18 12:43 PM.  Always use your most recent med list.                   Brand Name Dispense Instructions for use Diagnosis    fluticasone 50 MCG/ACT spray    FLONASE    16 g    Spray 1-2 sprays into both nostrils daily     Acute sinusitis with symptoms > 10 days       levonorgestrel-ethinyl estradiol 0.15-0.03 MG per tablet    SEASONALE    91 tablet    Take 1 tablet by mouth daily Must make appointment for future refills.    PCOS (polycystic ovarian syndrome)       lidocaine (viscous) 15 mL, alum & mag hydroxide-simethicone 15 mL GI Cocktail     30 mL    Take 30 mLs by mouth once for 1 dose    Nausea       omeprazole 20 MG CR capsule    priLOSEC    28 capsule    Take 1 capsule (20 mg) by mouth 2 times daily (before meals) for 14 days    Nausea

## 2018-02-07 ENCOUNTER — HOSPITAL ENCOUNTER (OUTPATIENT)
Dept: CT IMAGING | Facility: CLINIC | Age: 23
Discharge: HOME | End: 2018-02-07
Attending: PHYSICIAN ASSISTANT | Admitting: PHYSICIAN ASSISTANT
Payer: COMMERCIAL

## 2018-02-07 DIAGNOSIS — R11.0 NAUSEA: ICD-10-CM

## 2018-02-07 DIAGNOSIS — R19.7 DIARRHEA, UNSPECIFIED TYPE: ICD-10-CM

## 2018-02-07 DIAGNOSIS — R82.998 DARK BROWN URINE: ICD-10-CM

## 2018-02-07 DIAGNOSIS — R79.89 ELEVATED LFTS: ICD-10-CM

## 2018-02-07 LAB
ALBUMIN SERPL-MCNC: 4.4 G/DL (ref 3.4–5)
ALP SERPL-CCNC: 35 U/L (ref 40–150)
ALT SERPL W P-5'-P-CCNC: 93 U/L (ref 0–50)
ANION GAP SERPL CALCULATED.3IONS-SCNC: 9 MMOL/L (ref 3–14)
AST SERPL W P-5'-P-CCNC: 42 U/L (ref 0–45)
BILIRUB DIRECT SERPL-MCNC: 0.1 MG/DL (ref 0–0.2)
BILIRUB SERPL-MCNC: 0.4 MG/DL (ref 0.2–1.3)
BUN SERPL-MCNC: 9 MG/DL (ref 7–30)
CALCIUM SERPL-MCNC: 9.4 MG/DL (ref 8.5–10.1)
CHLORIDE SERPL-SCNC: 108 MMOL/L (ref 94–109)
CMV IGG SERPL QL IA: <0.2 AI (ref 0–0.8)
CMV IGM SERPL QL IA: <0.2 AI (ref 0–0.8)
CO2 SERPL-SCNC: 24 MMOL/L (ref 20–32)
CREAT SERPL-MCNC: 0.64 MG/DL (ref 0.52–1.04)
EBV VCA IGG SER QL IA: <0.2 AI (ref 0–0.8)
EBV VCA IGM SER QL IA: <0.2 AI (ref 0–0.8)
GFR SERPL CREATININE-BSD FRML MDRD: >90 ML/MIN/1.7M2
GGT SERPL-CCNC: 55 U/L (ref 0–40)
GLUCOSE SERPL-MCNC: 72 MG/DL (ref 70–99)
HCV AB SERPL QL IA: NONREACTIVE
POTASSIUM SERPL-SCNC: 4.2 MMOL/L (ref 3.4–5.3)
PROT SERPL-MCNC: 8.4 G/DL (ref 6.8–8.8)
SODIUM SERPL-SCNC: 141 MMOL/L (ref 133–144)

## 2018-02-07 PROCEDURE — 74177 CT ABD & PELVIS W/CONTRAST: CPT

## 2018-02-07 PROCEDURE — 25000128 H RX IP 250 OP 636: Performed by: PHYSICIAN ASSISTANT

## 2018-02-07 RX ORDER — IOPAMIDOL 755 MG/ML
500 INJECTION, SOLUTION INTRAVASCULAR ONCE
Status: COMPLETED | OUTPATIENT
Start: 2018-02-07 | End: 2018-02-07

## 2018-02-07 RX ADMIN — IOPAMIDOL 89 ML: 755 INJECTION, SOLUTION INTRAVENOUS at 13:49

## 2018-02-07 RX ADMIN — SODIUM CHLORIDE 51 ML: 9 INJECTION, SOLUTION INTRAVENOUS at 13:49

## 2018-02-07 ASSESSMENT — ANXIETY QUESTIONNAIRES: GAD7 TOTAL SCORE: 3

## 2018-02-07 ASSESSMENT — PATIENT HEALTH QUESTIONNAIRE - PHQ9: SUM OF ALL RESPONSES TO PHQ QUESTIONS 1-9: 8

## 2018-02-08 RX ORDER — ONDANSETRON 4 MG/1
4-8 TABLET, ORALLY DISINTEGRATING ORAL EVERY 8 HOURS PRN
Qty: 20 TABLET | Refills: 0 | Status: SHIPPED | OUTPATIENT
Start: 2018-02-08 | End: 2018-03-14

## 2018-02-08 NOTE — PROGRESS NOTES
Rayray  Here are your recent results.  Your liver function is improving and your do not have any evidence of any active infections (and you show immunity to Hepatitis A and B).  Your CT scan was also unremarkable.  How are you feeling?  Any improvement on the new medication?  If you have any questions please do not hesitate to contact our office via phone ([phone]) or MyChart.    Jennifer Aguilar, MS, PA-C  [facility] [place]

## 2018-02-08 NOTE — PROGRESS NOTES
Rayray  Here are your recent results.  Your CT is essentially normal.  If you pain persists please make a follow up appointment.   If you have any questions please do not hesitate to contact our office via phone ([phone]) or MyChart.    Jennifer Aguilar, MS, PAJimC  [facility] [place]

## 2018-02-12 DIAGNOSIS — R19.7 DIARRHEA, UNSPECIFIED TYPE: ICD-10-CM

## 2018-02-12 LAB
C COLI+JEJUNI+LARI FUSA STL QL NAA+PROBE: ABNORMAL
C DIFF TOX B STL QL: ABNORMAL
EC STX1 GENE STL QL NAA+PROBE: ABNORMAL
EC STX2 GENE STL QL NAA+PROBE: ABNORMAL
ENTERIC PATHOGEN COMMENT: ABNORMAL
G LAMBLIA AG STL QL IA: NORMAL
NOROV GI+II ORF1-ORF2 JNC STL QL NAA+PR: ABNORMAL
RVA NSP5 STL QL NAA+PROBE: ABNORMAL
SALMONELLA SP RPOD STL QL NAA+PROBE: ABNORMAL
SHIGELLA SP+EIEC IPAH STL QL NAA+PROBE: ABNORMAL
SPECIMEN SOURCE: ABNORMAL
SPECIMEN SOURCE: NORMAL
V CHOL+PARA RFBL+TRKH+TNAA STL QL NAA+PR: ABNORMAL
Y ENTERO RECN STL QL NAA+PROBE: ABNORMAL

## 2018-02-12 PROCEDURE — 87209 SMEAR COMPLEX STAIN: CPT | Performed by: PHYSICIAN ASSISTANT

## 2018-02-12 PROCEDURE — 87338 HPYLORI STOOL AG IA: CPT | Performed by: PHYSICIAN ASSISTANT

## 2018-02-12 PROCEDURE — 87177 OVA AND PARASITES SMEARS: CPT | Performed by: PHYSICIAN ASSISTANT

## 2018-02-13 ENCOUNTER — TELEPHONE (OUTPATIENT)
Dept: FAMILY MEDICINE | Facility: CLINIC | Age: 23
End: 2018-02-13

## 2018-02-13 DIAGNOSIS — R19.7 DIARRHEA: Primary | ICD-10-CM

## 2018-02-13 LAB
H PYLORI AG STL QL IA: NORMAL
O+P STL MICRO: NORMAL
O+P STL MICRO: NORMAL
SPECIMEN SOURCE: NORMAL
SPECIMEN SOURCE: NORMAL

## 2018-02-13 NOTE — TELEPHONE ENCOUNTER
Patient notified by phone.  She said she is still having diarrhea.  She will  new containers for stool culture. Order placed. Lab only scheduled for Friday drop off.  She said she is still having cramps and Ibuprofen not helping. Advised heat or Tylenol as needed.  Patient verbalized understanding and agreed with plan.    KEITH Jamison, RN, [facility]) [phone]

## 2018-02-13 NOTE — TELEPHONE ENCOUNTER
Name of caller: Rayray  Relationship of Patient: Self    Reason for Call: Patient received a call from triage, RN unavailable. Pt would like another call back.    Best phone number to reach pt at is: [phone]  Ok to leave a message with medical info? Yes    Pharmacy preferred (if calling for a refill): [facility]Patient [facility]

## 2018-02-13 NOTE — PROGRESS NOTES
Triage: please ensure pt is aware of results and recommendations:     Rayray  Here are your recent results.  All results are normal (however, your bacterial stool sample could not be evaluated due to contamination) - if she is still experiencing diarrhea I would advise that she recollect that specimen.  If that stool sample is normal the next step would be GI for further evaluation.  If you have any questions please do not hesitate to contact our office via phone ([phone]) or MyChart.    Jennifer Aguilar, MS, PA-C  [facility] [place]

## 2018-02-13 NOTE — TELEPHONE ENCOUNTER
Detailed message left for patient with lab result note.    Margo Linder, KEITH, RN, N  [facility]) [phone]

## 2018-02-16 DIAGNOSIS — R19.7 DIARRHEA: ICD-10-CM

## 2018-02-16 DIAGNOSIS — R19.7 DIARRHEA, UNSPECIFIED TYPE: ICD-10-CM

## 2018-02-16 PROCEDURE — 87329 GIARDIA AG IA: CPT | Performed by: PHYSICIAN ASSISTANT

## 2018-02-16 PROCEDURE — 87506 IADNA-DNA/RNA PROBE TQ 6-11: CPT | Performed by: PHYSICIAN ASSISTANT

## 2018-02-19 DIAGNOSIS — R11.0 NAUSEA: Primary | ICD-10-CM

## 2018-02-19 DIAGNOSIS — R19.7 DIARRHEA: ICD-10-CM

## 2018-02-19 DIAGNOSIS — M54.9 BACK PAIN: ICD-10-CM

## 2018-02-19 LAB
G LAMBLIA AG STL QL IA: NORMAL
SPECIMEN SOURCE: NORMAL

## 2018-02-23 ENCOUNTER — OFFICE VISIT (OUTPATIENT)
Dept: FAMILY MEDICINE | Facility: CLINIC | Age: 23
End: 2018-02-23
Payer: COMMERCIAL

## 2018-02-23 VITALS
DIASTOLIC BLOOD PRESSURE: 90 MMHG | HEART RATE: 61 BPM | SYSTOLIC BLOOD PRESSURE: 130 MMHG | WEIGHT: 178.5 LBS | HEIGHT: 62 IN | TEMPERATURE: 98.5 F | BODY MASS INDEX: 32.85 KG/M2

## 2018-02-23 DIAGNOSIS — Z23 NEED FOR PROPHYLACTIC VACCINATION AND INOCULATION AGAINST INFLUENZA: ICD-10-CM

## 2018-02-23 DIAGNOSIS — R10.84 ABDOMINAL PAIN, GENERALIZED: Primary | ICD-10-CM

## 2018-02-23 DIAGNOSIS — Z87.448 HISTORY OF BLOOD IN URINE: ICD-10-CM

## 2018-02-23 PROCEDURE — 99214 OFFICE O/P EST MOD 30 MIN: CPT | Mod: 25 | Performed by: PHYSICIAN ASSISTANT

## 2018-02-23 PROCEDURE — 90471 IMMUNIZATION ADMIN: CPT | Performed by: PHYSICIAN ASSISTANT

## 2018-02-23 PROCEDURE — 90686 IIV4 VACC NO PRSV 0.5 ML IM: CPT | Performed by: PHYSICIAN ASSISTANT

## 2018-02-23 NOTE — PROGRESS NOTES
SUBJECTIVE:                                                    Rayray Contreras is a 22 year old female who presents to clinic today for the following health issues:      Recheck labs, and flu shot.  Patient reports that her chart keeps warning her to get the flu shot, so she would like this done today.    She does have an intolerance to eggs listed on her allergies.  She reports that the reaction is increased need to have a bowel movement with eating eggs.  She says she gets the flu shot yearly and has not had a reaction to it.  She denies any swelling, rashes or hives with eggs or flu shots in the past.      Patient reports that she is still having some days of nausea.  She says that some days it is worse than others.  She takes the Zofran for the nausea.  She is not taking the Zofran every day.  She is eating and drinking, but she sometimes feels nauseated after eating and drinking.      She reports that she never did try the omeprazole as advised by Jennifer Aguilar.      Wt Readings from Last 4 Encounters:   02/23/18 178 lb 8 oz (81 kg)   02/06/18 177 lb 2 oz (80.3 kg)   01/31/18 176 lb (79.8 kg)   01/23/18 177 lb (80.3 kg)     Patient has not thrown up with her symptoms.      Click here for [place] stool scale.  She reports that her stools are a type 4 on the stool chart.    She says that when she has a bowel movement, she has some discomfort in her back.    She has not noticed blood in her stool.     She has not had fevers, but she sometimes thinks her forehead does get warm.  She denies having any chills.      Problem list and histories reviewed & adjusted, as indicated.  Additional history: as documented      ROS:  Constitutional, HEENT, cardiovascular, pulmonary, GI, , musculoskeletal, neuro, skin, endocrine and psych systems are negative, except as otherwise noted.    OBJECTIVE:                                                    /90 (BP Location: Right arm, Patient Position: Sitting, Cuff Size:  "Adult Regular)  Pulse 61  Temp 98.5  F (36.9  C) (Tympanic)  Ht 5' 2\" (1.575 m)  Wt 178 lb 8 oz (81 kg)  LMP 02/20/2018  PF 98 L/min  BMI 32.65 kg/m2  Body mass index is 32.65 kg/(m^2).  GENERAL: healthy, alert and no distress  EYES: Eyes grossly normal to inspection, PERRL and conjunctivae and sclerae normal  RESP: lungs clear to auscultation - no rales, rhonchi or wheezes  CV: regular rate and rhythm, normal S1 S2, no S3 or S4, no murmur, click or rub, no peripheral edema and peripheral pulses strong  ABDOMEN: soft, nontender, no hepatosplenomegaly, no masses and bowel sounds normal, reported diffuse tenderness to palpation of the abdomen, no guarding or rebound tenderness.    MS: no gross musculoskeletal defects noted, no edema  NEURO: Normal strength and tone, mentation intact and speech normal  BACK: no CVA tenderness, no paralumbar tenderness  PSYCH: mentation appears normal, affect normal/bright    Diagnostic Test Results:  Labs - pending     ASSESSMENT/PLAN:                                                      Rayray was seen today for recheck.    Diagnoses and all orders for this visit:    Abdominal pain, generalized  -     GASTROENTEROLOGY ADULT REF CONSULT ONLY  -     omeprazole (PRILOSEC) 20 MG CR capsule; Take 1 capsule (20 mg) by mouth daily    Need for prophylactic vaccination and inoculation against influenza  -     HC FLU VAC PRESRV FREE QUAD SPLIT VIR 3+YRS IM  [[id number]]  -          ADMIN VACCINE, FIRST [[id number]]    History of blood in urine  -     *UA reflex to Microscopic and Culture ([facility] (except [place] and [place]); Future    Other orders  -     Cancel: Lipid panel reflex to direct LDL Fasting; Future      - Exam today is unremarkable.  Patient does report to having some tenderness to palpation of her abdomen (generalized) and her lower back.  She has no guarding, wincing, or rebound tenderness noted on exam.  She also has normal range of motion and strength of " her back.    - Patient advised to try the Omeprazole.  She can take one tablet once daily about 30 minutes before eating anything for the day.  An Rx was sent to her pharmacy as she reports she does not have the prescription from Jennifer Aguilar anymore.   - Referral made for GI today for further evaluation as labs appear to be within normal limits.    - Patient's recent UA shows mild RBC, therefore repeat UA advised today.  Patient is currently menstruating and therefore was advised to have the UA repeated when she is not menstruating.  Order was placed to future status.      - Flu shot administered today.  Patient has had several vaccine in the past and reports to tolerating them without concern.  Patient states that listed allergy is a sensitivity to eggs that causes her to have increased BM's.  She denies rashes, hives, swelling or anaphylaxis.    - Patient was asked to wait 15 post injection to monitor for tolerance, she did fine.  She left the clinic without concerns.      - Patient advised to followup if symptoms do not improve.  She should seek more immediate medical attention if symptoms change or worsen in any way.      - I have discussed the patient's diagnosis, and my plan of treatment with the patient and/or family. Patient is aware to followup if symptoms do not improve.  Patient has been advised to be seen sooner or seek more immediate care if symptoms change or worsen.  Patient agrees with and understands the plan today.     See Patient Instructions:  Lets have you repeat the UA after you are done menstruating.  You can make a lab only appointment for this.     Please followup with GI for the continued complaints.      Also, lets have you try the omeprazole medication.  I sent this to your pharmacy.  You can take one pill about 30 minutes before eating anything in the morning.  Take it with water, not juice.      Please followup in clinic for your routine exam when due, and be seen sooner if  needed.    Please seek immediate medical attention if symptoms change or worsen in any way.            Yesi Dumont PA-C    [facility]

## 2018-02-23 NOTE — PROGRESS NOTES
Injectable Influenza Immunization Documentation    1.  Is the person to be vaccinated sick today?   No    2. Does the person to be vaccinated have an allergy to a component   of the vaccine?  Yes  Egg Allergy Algorithm Link    3. Has the person to be vaccinated ever had a serious reaction   to influenza vaccine in the past?   No    4. Has the person to be vaccinated ever had Guillain-Barré syndrome?   No    Form completed by Tonia Haney MA

## 2018-02-23 NOTE — LETTER
My Depression Action Plan  Name: Rayray Contreras   Date of Birth 1995  Date: 2/23/2018    My doctor: [facility]   My clinic: [address] [id number] [phone]          GREEN    ZONE   Good Control    What it looks like:     Things are going generally well. You have normal up s and down s. You may even feel depressed from time to time, but bad moods usually last less than a day.   What you need to do:  1. Continue to care for yourself (see self care plan)  2. Check your depression survival kit and update it as needed  3. Follow your physician s recommendations including any medication.  4. Do not stop taking medication unless you consult with your physician first.           YELLOW         ZONE Getting Worse    What it looks like:     Depression is starting to interfere with your life.     It may be hard to get out of bed; you may be starting to isolate yourself from others.    Symptoms of depression are starting to last most all day and this has happened for several days.     You may have suicidal thoughts but they are not constant.   What you need to do:     1. Call your care team, your response to treatment will improve if you keep your care team informed of your progress. Yellow periods are signs an adjustment may need to be made.     2. Continue your self-care, even if you have to fake it!    3. Talk to someone in your support network    4. Open up your depression survival kit           RED    ZONE Medical Alert - Get Help    What it looks like:     Depression is seriously interfering with your life.     You may experience these or other symptoms: You can t get out of bed most days, can t work or engage in other necessary activities, you have trouble taking care of basic hygiene, or basic responsibilities, thoughts of suicide or death that will not go away, self-injurious behavior.     What you need to do:  1. Call  your care team and request a same-day appointment. If they are not available (weekends or after hours) call your local crisis line, emergency room or 911.      Electronically signed by: Crista Beard, February 23, 2018    Depression Self Care Plan / Survival Kit    Self-Care for Depression  Here s the deal. Your body and mind are really not as separate as most people think.  What you do and think affects how you feel and how you feel influences what you do and think. This means if you do things that people who feel good do, it will help you feel better.  Sometimes this is all it takes.  There is also a place for medication and therapy depending on how severe your depression is, so be sure to consult with your medical provider and/ or Behavioral Health Consultant if your symptoms are worsening or not improving.     In order to better manage my stress, I will:    Exercise  Get some form of exercise, every day. This will help reduce pain and release endorphins, the  feel good  chemicals in your brain. This is almost as good as taking antidepressants!  This is not the same as joining a gym and then never going! (they count on that by the way ) It can be as simple as just going for a walk or doing some gardening, anything that will get you moving.      Hygiene   Maintain good hygiene (Get out of bed in the morning, Make your bed, Brush your teeth, Take a shower, and Get dressed like you were going to work, even if you are unemployed).  If your clothes don't fit try to get ones that do.    Diet  I will strive to eat foods that are good for me, drink plenty of water, and avoid excessive sugar, caffeine, alcohol, and other mood-altering substances.  Some foods that are helpful in depression are: complex carbohydrates, B vitamins, flaxseed, fish or fish oil, fresh fruits and vegetables.    Psychotherapy  I agree to participate in Individual Therapy (if recommended).    Medication  If prescribed medications, I agree to  take them.  Missing doses can result in serious side effects.  I understand that drinking alcohol, or other illicit drug use, may cause potential side effects.  I will not stop my medication abruptly without first discussing it with my provider.    Staying Connected With Others  I will stay in touch with my friends, family members, and my primary care provider/team.    Use your imagination  Be creative.  We all have a creative side; it doesn t matter if it s oil painting, sand castles, or mud pies! This will also kick up the endorphins.    Witness Beauty  (AKA stop and smell the roses) Take a look outside, even in mid-winter. Notice colors, textures. Watch the squirrels and birds.     Service to others  Be of service to others.  There is always someone else in need.  By helping others we can  get out of ourselves  and remember the really important things.  This also provides opportunities for practicing all the other parts of the program.    Humor  Laugh and be silly!  Adjust your TV habits for less news and crime-drama and more comedy.    Control your stress  Try breathing deep, massage therapy, biofeedback, and meditation. Find time to relax each day.     My support system    Clinic Contact:  Phone number:    Contact 1:  Phone number:    Contact 2:  Phone number:    Islam/:  Phone number:    Therapist:  Phone number:    Local crisis center:    Phone number:    Other community support:  Phone number:

## 2018-02-23 NOTE — MR AVS SNAPSHOT
After Visit Summary   2/23/2018    Rayray Contreras    MRN: [id number]           Patient Information     Date Of Birth          1995        Visit Information        Provider Department      2/23/2018 10:20 AM Yesi Dumont PA-C [facility] [place]        Today's Diagnoses     Abdominal pain, generalized    -  1    Need for prophylactic vaccination and inoculation against influenza        History of blood in urine          Care Instructions    Lets have you repeat the UA after you are done menstruating.  You can make a lab only appointment for this.     Please followup with GI for the continued complaints.      Also, lets have you try the omeprazole medication.  I sent this to your pharmacy.  You can take one pill about 30 minutes before eating anything in the morning.  Take it with water, not juice.      Please followup in clinic for your routine exam when due, and be seen sooner if needed.    Please seek immediate medical attention if symptoms change or worsen in any way.              Follow-ups after your visit        Additional Services     GASTROENTEROLOGY ADULT REF CONSULT ONLY       Preferred Location: [facility], [facility] [phone] and [place] [facility] [phone]      Please be aware that coverage of these services is subject to the terms and limitations of your health insurance plan.  Call member services at your health plan with any benefit or coverage questions.  Any procedures must be performed at a [facility] facility OR coordinated by your clinic's referral office.    Please bring the following with you to your appointment:    (1) Any X-Rays, CTs or MRIs which have been performed.  Contact the facility where they were done to arrange for  prior to your scheduled appointment.    (2) List of current medications   (3) This referral request   (4) Any documents/labs given to you for this referral                  Future tests that were ordered for you today     Open Future  "Orders        Priority Expected Expires Ordered    *UA reflex to Microscopic and Culture ([facility] (except [place] and [place]) Routine  2/23/2019 2/23/2018            Who to contact     If you have questions or need follow up information about today's clinic visit or your schedule please contact [facility] directly at [phone].  Normal or non-critical lab and imaging results will be communicated to you by MyChart, letter or phone within 4 business days after the clinic has received the results. If you do not hear from us within 7 days, please contact the clinic through [employer] or phone. If you have a critical or abnormal lab result, we will notify you by phone as soon as possible.  Submit refill requests through [employer] or call your pharmacy and they will forward the refill request to us. Please allow 3 business days for your refill to be completed.          Additional Information About Your Visit        [employer] Information     [employer] gives you secure access to your electronic health record. If you see a primary care provider, you can also send messages to your care team and make appointments. If you have questions, please call your primary care clinic.  If you do not have a primary care provider, please call [phone] and they will assist you.        Care EveryWhere ID     This is your Care EveryWhere ID. This could be used by other organizations to access your [facility] medical records  [id number]        Your Vitals Were     Pulse Temperature Height Last Period Peak Flow BMI (Body Mass Index)    61 98.5  F (36.9  C) (Tympanic) 5' 2\" (1.575 m) 02/20/2018 98 L/min 32.65 kg/m2       Blood Pressure from Last 3 Encounters:   02/23/18 130/90   02/06/18 118/82   01/31/18 122/80    Weight from Last 3 Encounters:   02/23/18 178 lb 8 oz (81 kg)   02/06/18 177 lb 2 oz (80.3 kg)   01/31/18 176 lb (79.8 kg)              We Performed the Following          ADMIN VACCINE, FIRST " [[id number]]     GASTROENTEROLOGY ADULT REF CONSULT ONLY     HC FLU VAC PRESRV FREE QUAD SPLIT VIR 3+YRS IM  [[id number]]          Today's Medication Changes          These changes are accurate as of 2/23/18 10:32 AM.  If you have any questions, ask your nurse or doctor.               Start taking these medicines.        Dose/Directions    omeprazole 20 MG CR capsule   Commonly known as:  priLOSEC   Used for:  Abdominal pain, generalized   Started by:  Yesi Dumont, JORGE        Dose:  20 mg   Take 1 capsule (20 mg) by mouth daily   Quantity:  30 capsule   Refills:  1            Where to get your medicines      These medications were sent to [facility] [id number] AT [facility] [address], [facility] [address]    Hours:  24-hours Phone:  [phone]     omeprazole 20 MG CR capsule                Primary Care Provider Office Phone # Fax #    [facility] [phone] [address]  [place] [address]        Equal Access to Services     [facility]: Hadii aad ku hadasho Soomaali, waaxda luqadaha, qaybta kaalmada adeegyada, waxay idiin hayasiyan bessy [place] . So [facility] [phone].    ATENCIÓN: Si habla español, tiene a acosta disposición servicios gratuitos de asistencia lingüística. Llame al [phone].    We comply with applicable federal civil rights laws and [place] laws. We do not discriminate on the basis of race, color, national origin, age, disability, sex, sexual orientation, or gender identity.            Thank you!     Thank you for choosing [facility]  for your care. Our goal is always to provide you with excellent care. Hearing back from our patients is one way we can continue to improve our services. Please take a few minutes to complete the written survey that you may receive in the mail after your visit with us. Thank you!             Your Updated Medication List - Protect others  around you: Learn how to safely use, store and throw away your medicines at www.disposemymeds.org.          This list is accurate as of 2/23/18 10:32 AM.  Always use your most recent med list.                   Brand Name Dispense Instructions for use Diagnosis    fluticasone 50 MCG/ACT spray    FLONASE    16 g    Spray 1-2 sprays into both nostrils daily    Acute sinusitis with symptoms > 10 days       levonorgestrel-ethinyl estradiol 0.15-0.03 MG per tablet    SEASONALE    91 tablet    Take 1 tablet by mouth daily Must make appointment for future refills.    PCOS (polycystic ovarian syndrome)       omeprazole 20 MG CR capsule    priLOSEC    30 capsule    Take 1 capsule (20 mg) by mouth daily    Abdominal pain, generalized       ondansetron 4 MG ODT tab    ZOFRAN ODT    20 tablet    Take 1-2 tablets (4-8 mg) by mouth every 8 hours as needed for nausea    Nausea

## 2018-02-23 NOTE — NURSING NOTE
"Chief Complaint   Patient presents with     RECHECK       Initial /90 (BP Location: Right arm, Patient Position: Sitting, Cuff Size: Adult Regular)  Pulse 61  Temp 98.5  F (36.9  C) (Tympanic)  Ht 5' 2\" (1.575 m)  Wt 178 lb 8 oz (81 kg)  LMP 02/20/2018  PF 98 L/min  BMI 32.65 kg/m2 Estimated body mass index is 32.65 kg/(m^2) as calculated from the following:    Height as of this encounter: 5' 2\" (1.575 m).    Weight as of this encounter: 178 lb 8 oz (81 kg).  Medication Reconciliation: complete     Tonia Haney MA     "

## 2018-02-23 NOTE — PATIENT INSTRUCTIONS
Lets have you repeat the UA after you are done menstruating.  You can make a lab only appointment for this.     Please followup with GI for the continued complaints.      Also, lets have you try the omeprazole medication.  I sent this to your pharmacy.  You can take one pill about 30 minutes before eating anything in the morning.  Take it with water, not juice.      Please followup in clinic for your routine exam when due, and be seen sooner if needed.    Please seek immediate medical attention if symptoms change or worsen in any way.

## 2018-03-14 ENCOUNTER — OFFICE VISIT (OUTPATIENT)
Dept: FAMILY MEDICINE | Facility: CLINIC | Age: 23
End: 2018-03-14
Payer: COMMERCIAL

## 2018-03-14 VITALS
OXYGEN SATURATION: 99 % | TEMPERATURE: 98.4 F | BODY MASS INDEX: 32.2 KG/M2 | SYSTOLIC BLOOD PRESSURE: 118 MMHG | HEART RATE: 69 BPM | WEIGHT: 175 LBS | DIASTOLIC BLOOD PRESSURE: 74 MMHG | HEIGHT: 62 IN

## 2018-03-14 DIAGNOSIS — Z83.3 FAMILY HISTORY OF DIABETES MELLITUS: ICD-10-CM

## 2018-03-14 DIAGNOSIS — Z87.448 HISTORY OF BLOOD IN URINE: ICD-10-CM

## 2018-03-14 DIAGNOSIS — K21.9 GASTROESOPHAGEAL REFLUX DISEASE, ESOPHAGITIS PRESENCE NOT SPECIFIED: ICD-10-CM

## 2018-03-14 DIAGNOSIS — E28.2 PCOS (POLYCYSTIC OVARIAN SYNDROME): ICD-10-CM

## 2018-03-14 DIAGNOSIS — Z00.00 ROUTINE GENERAL MEDICAL EXAMINATION AT A HEALTH CARE FACILITY: Primary | ICD-10-CM

## 2018-03-14 LAB
ALBUMIN UR-MCNC: 30 MG/DL
APPEARANCE UR: CLEAR
BACTERIA #/AREA URNS HPF: ABNORMAL /HPF
BASOPHILS # BLD AUTO: 0 10E9/L (ref 0–0.2)
BASOPHILS NFR BLD AUTO: 0.4 %
BILIRUB UR QL STRIP: ABNORMAL
COLOR UR AUTO: YELLOW
DIFFERENTIAL METHOD BLD: ABNORMAL
EOSINOPHIL # BLD AUTO: 0.2 10E9/L (ref 0–0.7)
EOSINOPHIL NFR BLD AUTO: 1.4 %
ERYTHROCYTE [DISTWIDTH] IN BLOOD BY AUTOMATED COUNT: 12.1 % (ref 10–15)
GLUCOSE BLD-MCNC: 67 MG/DL (ref 70–99)
GLUCOSE UR STRIP-MCNC: NEGATIVE MG/DL
HCT VFR BLD AUTO: 45 % (ref 35–47)
HGB BLD-MCNC: 15.1 G/DL (ref 11.7–15.7)
HGB UR QL STRIP: NEGATIVE
KETONES UR STRIP-MCNC: 15 MG/DL
LEUKOCYTE ESTERASE UR QL STRIP: NEGATIVE
LYMPHOCYTES # BLD AUTO: 3.3 10E9/L (ref 0.8–5.3)
LYMPHOCYTES NFR BLD AUTO: 29 %
MCH RBC QN AUTO: 32.1 PG (ref 26.5–33)
MCHC RBC AUTO-ENTMCNC: 33.6 G/DL (ref 31.5–36.5)
MCV RBC AUTO: 96 FL (ref 78–100)
MONOCYTES # BLD AUTO: 1.1 10E9/L (ref 0–1.3)
MONOCYTES NFR BLD AUTO: 9.5 %
MUCOUS THREADS #/AREA URNS LPF: PRESENT /LPF
NEUTROPHILS # BLD AUTO: 6.8 10E9/L (ref 1.6–8.3)
NEUTROPHILS NFR BLD AUTO: 59.7 %
NITRATE UR QL: NEGATIVE
NON-SQ EPI CELLS #/AREA URNS LPF: ABNORMAL /LPF
PH UR STRIP: 6 PH (ref 5–7)
PLATELET # BLD AUTO: 304 10E9/L (ref 150–450)
RBC # BLD AUTO: 4.7 10E12/L (ref 3.8–5.2)
RBC #/AREA URNS AUTO: ABNORMAL /HPF
SOURCE: ABNORMAL
SP GR UR STRIP: 1.02 (ref 1–1.03)
UROBILINOGEN UR STRIP-ACNC: 0.2 EU/DL (ref 0.2–1)
WBC # BLD AUTO: 11.4 10E9/L (ref 4–11)
WBC #/AREA URNS AUTO: ABNORMAL /HPF

## 2018-03-14 PROCEDURE — 84443 ASSAY THYROID STIM HORMONE: CPT | Performed by: PHYSICIAN ASSISTANT

## 2018-03-14 PROCEDURE — 36415 COLL VENOUS BLD VENIPUNCTURE: CPT | Performed by: PHYSICIAN ASSISTANT

## 2018-03-14 PROCEDURE — 82947 ASSAY GLUCOSE BLOOD QUANT: CPT | Performed by: PHYSICIAN ASSISTANT

## 2018-03-14 PROCEDURE — 99395 PREV VISIT EST AGE 18-39: CPT | Performed by: PHYSICIAN ASSISTANT

## 2018-03-14 PROCEDURE — 81001 URINALYSIS AUTO W/SCOPE: CPT | Performed by: PHYSICIAN ASSISTANT

## 2018-03-14 PROCEDURE — 80061 LIPID PANEL: CPT | Performed by: PHYSICIAN ASSISTANT

## 2018-03-14 PROCEDURE — 85025 COMPLETE CBC W/AUTO DIFF WBC: CPT | Performed by: PHYSICIAN ASSISTANT

## 2018-03-14 RX ORDER — LEVONORGESTREL AND ETHINYL ESTRADIOL 0.15-0.03
1 KIT ORAL DAILY
Qty: 91 TABLET | Refills: 3 | Status: SHIPPED | OUTPATIENT
Start: 2018-03-14 | End: 2018-12-13

## 2018-03-14 NOTE — PROGRESS NOTES
SUBJECTIVE:   CC: Rayray Contreras is an 22 year old woman who presents for preventive health visit.     Physical   Annual:     Getting at least 3 servings of Calcium per day::  NO    Bi-annual eye exam::  NO    Dental care twice a year::  NO    Sleep apnea or symptoms of sleep apnea::  None    Diet::  Regular (no restrictions)    Frequency of exercise::  1 day/week    Duration of exercise::  Less than 15 minutes    Taking medications regularly::  Yes    Medication side effects::  None    Additional concerns today::  YES          Only concern today is there is a family history with grandparents of heart attack and stroke.  Patient solange not know at what ages these events occurred.  Her Mom's dad is alive at age 83 and Dad's mom is alive at age 85.  Mom's mom passed away from colon cancer and she does not know what her dad's dad  of.        She has been taking the omeprazole for the stomach issues and says that symptoms are improving.  She has not gotten in to see GI yet, but will make that appointment today.      She decided to be seen today because she needs a refill of her birth control.       Medication Followup of [place]    Taking Medication as prescribed: yes    Side Effects:  None    Medication Helping Symptoms:  yes      Today's PHQ-2 Score: 0-0  PHQ-2 (  [employer]) 3/11/2018   Q1: Little interest or pleasure in doing things 0   Q2: Feeling down, depressed or hopeless 0   PHQ-2 Score 0   Q1: Little interest or pleasure in doing things Not at all   Q2: Feeling down, depressed or hopeless Not at all   PHQ-2 Score 0       Abuse: Current or Past(Physical, Sexual or Emotional)- No  Do you feel safe in your environment - Yes    Social History   Substance Use Topics     Smoking status: Never Smoker     Smokeless tobacco: Never Used     Alcohol use No      Comment: once every few months      No flowsheet data found.    Reviewed orders with patient.  Reviewed health maintenance and updated orders accordingly  "- Yes  Labs reviewed in EPIC    Mammogram not appropriate for this patient based on age.    Pertinent mammograms are reviewed under the imaging tab.  History of abnormal Pap smear:   Last 3 Pap Results:   PAP (no units)   Date Value   10/13/2015 NIL       Reviewed and updated as needed this visit by clinical staff         Reviewed and updated as needed this visit by Provider            Review of Systems  C: NEGATIVE for fever, chills, change in weight  I: NEGATIVE for worrisome rashes, moles or lesions  E: NEGATIVE for vision changes or irritation  ENT: NEGATIVE for ear, mouth and throat problems  R: NEGATIVE for significant cough or SOB  B: NEGATIVE for masses, tenderness or discharge  CV: NEGATIVE for chest pain, palpitations or peripheral edema  GI: NEGATIVE for nausea, abdominal pain, heartburn, or change in bowel habits  : NEGATIVE for unusual urinary or vaginal symptoms. Periods are regular.  M: NEGATIVE for significant arthralgias or myalgia  N: NEGATIVE for weakness, dizziness or paresthesias  P: NEGATIVE for changes in mood or affect     OBJECTIVE:   /74 (BP Location: Left arm, Patient Position: Chair, Cuff Size: Adult Large)  Pulse 69  Temp 98.4  F (36.9  C) (Oral)  Ht 5' 2\" (1.575 m)  Wt 175 lb (79.4 kg)  LMP 02/20/2018  SpO2 99%  Breastfeeding? No  BMI 32.01 kg/m2  Physical Exam  GENERAL: healthy, alert and no distress  EYES: Eyes grossly normal to inspection, PERRL and conjunctivae and sclerae normal  HENT: ear canals and TM's normal, nose and mouth without ulcers or lesions  NECK: no adenopathy, no asymmetry, masses, or scars and thyroid normal to palpation  RESP: lungs clear to auscultation - no rales, rhonchi or wheezes  BREAST: normal without masses, tenderness or nipple discharge and no palpable axillary masses or adenopathy  CV: regular rate and rhythm, normal S1 S2, no S3 or S4, no murmur, click or rub, no peripheral edema and peripheral pulses strong  ABDOMEN: soft, nontender, no " "hepatosplenomegaly, no masses and bowel sounds normal  MS: no gross musculoskeletal defects noted, no edema  SKIN: no suspicious lesions or rashes  NEURO: Normal strength and tone, mentation intact and speech normal  PSYCH: mentation appears normal, affect normal/bright    ASSESSMENT/PLAN:   1. Routine general medical examination at a health care facility    - Lipid panel reflex to direct LDL Fasting  - TSH with free T4 reflex  - CBC with platelets and differential  - Urine Microscopic    2. History of blood in urine    - *UA reflex to Microscopic and Culture ([facility] (except [place] and [place])    3. Family history of diabetes mellitus    - Glucose, whole blood    4. PCOS (polycystic ovarian syndrome)    - levonorgestrel-ethinyl estradiol (SEASONALE) 0.15-0.03 MG per tablet; Take 1 tablet by mouth daily Must make appointment for future refills.  Dispense: 91 tablet; Refill: 3    5. Gastroesophageal reflux disease, esophagitis presence not specified    - Patient will continue the Omeprazole as she is improved on this medication.    - Patient has referral to GI, and will make appointment to see them as soon as possible.  She does report improvement of symptoms since last office visit.        COUNSELING:  Reviewed preventive health counseling, as reflected in patient instructions         reports that she has never smoked. She has never used smokeless tobacco.    Estimated body mass index is 32.65 kg/(m^2) as calculated from the following:    Height as of 2/23/18: 5' 2\" (1.575 m).    Weight as of 2/23/18: 178 lb 8 oz (81 kg).   Weight management plan: Discussed healthy diet and exercise guidelines and patient will follow up in 12 months in clinic to re-evaluate.    Counseling Resources:  ATP IV Guidelines  Pooled Cohorts Equation Calculator  Breast Cancer Risk Calculator  FRAX Risk Assessment  ICSI Preventive Guidelines  Dietary Guidelines for Americans, 2010  USDA's MyPlate  ASA " Prophylaxis  Lung CA Screening    Yesi Dumont PA-C  [facility]  Answers for HPI/ROS submitted by the patient on 3/11/2018   PHQ-2 Score: 0    Answers for HPI/ROS submitted by the patient on 3/11/2018   PHQ-2 Score: 0

## 2018-03-14 NOTE — MR AVS SNAPSHOT
After Visit Summary   3/14/2018    Rayray Contreras    MRN: [id number]           Patient Information     Date Of Birth          1995        Visit Information        Provider Department      3/14/2018 2:20 PM Yesi Dumont PA-C [facility]        Today's Diagnoses     Routine general medical examination at a health care facility    -  1    History of blood in urine        Family history of diabetes mellitus        PCOS (polycystic ovarian syndrome)          Care Instructions      Preventive Health Recommendations  Female Ages 18 to 25     Yearly exam:     See your health care provider every year in order to  o Review health changes.   o Discuss preventive care.    o Review your medicines if your doctor has prescribed any.      You should be tested each year for STDs (sexually transmitted diseases).       After age 20, talk to your provider about how often you should have cholesterol testing.      Starting at age 21, get a Pap test every three years. If you have an abnormal result, your doctor may have you test more often.      If you are at risk for diabetes, you should have a diabetes test (fasting glucose).     Shots:     Get a flu shot each year.     Get a tetanus shot every 10 years.     Consider getting the shot (vaccine) that prevents cervical cancer (Gardasil).    Nutrition:     Eat at least 5 servings of fruits and vegetables each day.    Eat whole-grain bread, whole-wheat pasta and brown rice instead of white grains and rice.    Talk to your provider about Calcium and Vitamin D.     Lifestyle    Exercise at least 150 minutes a week each week (30 minutes a day, 5 days a week). This will help you control your weight and prevent disease.    Limit alcohol to one drink per day.    No smoking.     Wear sunscreen to prevent skin cancer.    See your dentist every six months for an exam and cleaning.          Follow-ups after your visit        Who to contact     If you have  "questions or need follow up information about today's clinic visit or your schedule please contact [facility] directly at [phone].  Normal or non-critical lab and imaging results will be communicated to you by MyChart, letter or phone within 4 business days after the clinic has received the results. If you do not hear from us within 7 days, please contact the clinic through [employer] or phone. If you have a critical or abnormal lab result, we will notify you by phone as soon as possible.  Submit refill requests through [employer] or call your pharmacy and they will forward the refill request to us. Please allow 3 business days for your refill to be completed.          Additional Information About Your Visit        [employer][x+1] Information     [employer] gives you secure access to your electronic health record. If you see a primary care provider, you can also send messages to your care team and make appointments. If you have questions, please call your primary care clinic.  If you do not have a primary care provider, please call [phone] and they will assist you.        Care EveryWhere ID     This is your Care EveryWhere ID. This could be used by other organizations to access your [facility] medical records  [id number]        Your Vitals Were     Pulse Temperature Height Last Period Pulse Oximetry Breastfeeding?    69 98.4  F (36.9  C) (Oral) 5' 2\" (1.575 m) 02/20/2018 99% No    BMI (Body Mass Index)                   32.01 kg/m2            Blood Pressure from Last 3 Encounters:   03/14/18 118/74   02/23/18 130/90   02/06/18 118/82    Weight from Last 3 Encounters:   03/14/18 175 lb (79.4 kg)   02/23/18 178 lb 8 oz (81 kg)   02/06/18 177 lb 2 oz (80.3 kg)              We Performed the Following     *UA reflex to Microscopic and Culture ([place] [facility] (except [place] and [place])     CBC with platelets and differential     Glucose, whole blood     Lipid panel reflex to direct LDL Fasting  "    TSH with free T4 reflex     Urine Microscopic          Where to get your medicines      These medications were sent to [facility] [address] [address] AT [address] [address], [facility] [address]    Hours:  24-hours Phone:  [phone]     levonorgestrel-ethinyl estradiol 0.15-0.03 MG per tablet          Primary Care Provider Office Phone # Fax #    [facility] [phone] [address]  [place] [address]        Equal Access to Services     GONZALO BAUTISTA : Hadii uzair maynard hadasho Soomaali, waaxda luqadaha, qaybta kaalmada bessyyada, camilo rutherford . So [facility] [phone].    ATENCIÓN: Si habla español, tiene a acosta disposición servicios gratuitos de asistencia lingüística. [facility] [phone].    We comply with applicable federal civil rights laws and [place] laws. We do not discriminate on the basis of race, color, national origin, age, disability, sex, sexual orientation, or gender identity.            Thank you!     Thank you for choosing [facility]  for your care. Our goal is always to provide you with excellent care. Hearing back from our patients is one way we can continue to improve our services. Please take a few minutes to complete the written survey that you may receive in the mail after your visit with us. Thank you!             Your Updated Medication List - Protect others around you: Learn how to safely use, store and throw away your medicines at www.disposemymeds.org.          This list is accurate as of 3/14/18  3:11 PM.  Always use your most recent med list.                   Brand Name Dispense Instructions for use Diagnosis    levonorgestrel-ethinyl estradiol 0.15-0.03 MG per tablet    SEASONALE    91 tablet    Take 1 tablet by mouth daily Must make appointment for future refills.    PCOS (polycystic ovarian syndrome)       omeprazole 20 MG CR capsule     priLOSEC    30 capsule    Take 1 capsule (20 mg) by mouth daily    Abdominal pain, generalized

## 2018-03-14 NOTE — NURSING NOTE
"Chief Complaint   Patient presents with     Physical       Initial /74 (BP Location: Left arm, Patient Position: Chair, Cuff Size: Adult Large)  Pulse 69  Temp 98.4  F (36.9  C) (Oral)  Ht 5' 2\" (1.575 m)  Wt 175 lb (79.4 kg)  LMP 02/20/2018  SpO2 99%  Breastfeeding? No  BMI 32.01 kg/m2 Estimated body mass index is 32.01 kg/(m^2) as calculated from the following:    Height as of this encounter: 5' 2\" (1.575 m).    Weight as of this encounter: 175 lb (79.4 kg).  Medication Reconciliation: complete   Csaba Mlnarik CMA    "

## 2018-03-15 LAB
CHOLEST SERPL-MCNC: 179 MG/DL
HDLC SERPL-MCNC: 60 MG/DL
LDLC SERPL CALC-MCNC: 107 MG/DL
NONHDLC SERPL-MCNC: 119 MG/DL
TRIGL SERPL-MCNC: 62 MG/DL
TSH SERPL DL<=0.005 MIU/L-ACNC: 0.79 MU/L (ref 0.4–4)

## 2018-03-17 DIAGNOSIS — D72.829 LEUKOCYTOSIS, UNSPECIFIED TYPE: Primary | ICD-10-CM

## 2018-03-17 NOTE — PROGRESS NOTES
Diana Seo ,    The results from your recent lab work are within normal limits.    - The white blood cell count is mildly elevated.  Please followup in clinic if you have symptoms of infection or being ill.  Also, we will re-check this lab in about 2 weeks.  Please make a lab only appointment to have another CBC done for about 2 weeks from now.     - Please be seen sooner if needed.        Thank you for choosing [facility] for your health care needs,      Yesi Dumont PA-C

## 2018-04-19 ENCOUNTER — MYC MEDICAL ADVICE (OUTPATIENT)
Dept: ORTHOPEDICS | Facility: CLINIC | Age: 23
End: 2018-04-19

## 2018-04-19 DIAGNOSIS — R20.0 NUMBNESS OF RIGHT HAND: Primary | ICD-10-CM

## 2018-04-20 NOTE — TELEPHONE ENCOUNTER
Please see [employer] message requesting second opinion with neurology.     Order started and pending for completion.     ESTELLA Buckner RN

## 2018-05-01 ENCOUNTER — TRANSFERRED RECORDS (OUTPATIENT)
Dept: HEALTH INFORMATION MANAGEMENT | Facility: CLINIC | Age: 23
End: 2018-05-01

## 2018-05-15 ENCOUNTER — OFFICE VISIT (OUTPATIENT)
Dept: FAMILY MEDICINE | Facility: CLINIC | Age: 23
End: 2018-05-15
Payer: COMMERCIAL

## 2018-05-15 VITALS
SYSTOLIC BLOOD PRESSURE: 110 MMHG | HEART RATE: 64 BPM | OXYGEN SATURATION: 99 % | BODY MASS INDEX: 32.57 KG/M2 | TEMPERATURE: 98.9 F | HEIGHT: 62 IN | DIASTOLIC BLOOD PRESSURE: 68 MMHG | WEIGHT: 177 LBS

## 2018-05-15 DIAGNOSIS — Z12.4 SCREENING FOR MALIGNANT NEOPLASM OF CERVIX: Primary | ICD-10-CM

## 2018-05-15 DIAGNOSIS — R10.84 ABDOMINAL PAIN, GENERALIZED: ICD-10-CM

## 2018-05-15 PROCEDURE — 99213 OFFICE O/P EST LOW 20 MIN: CPT | Performed by: PHYSICIAN ASSISTANT

## 2018-05-15 PROCEDURE — G0145 SCR C/V CYTO,THINLAYER,RESCR: HCPCS | Performed by: PHYSICIAN ASSISTANT

## 2018-05-15 ASSESSMENT — ANXIETY QUESTIONNAIRES
3. WORRYING TOO MUCH ABOUT DIFFERENT THINGS: NOT AT ALL
GAD7 TOTAL SCORE: 2
2. NOT BEING ABLE TO STOP OR CONTROL WORRYING: SEVERAL DAYS
1. FEELING NERVOUS, ANXIOUS, OR ON EDGE: NOT AT ALL
6. BECOMING EASILY ANNOYED OR IRRITABLE: SEVERAL DAYS
IF YOU CHECKED OFF ANY PROBLEMS ON THIS QUESTIONNAIRE, HOW DIFFICULT HAVE THESE PROBLEMS MADE IT FOR YOU TO DO YOUR WORK, TAKE CARE OF THINGS AT HOME, OR GET ALONG WITH OTHER PEOPLE: NOT DIFFICULT AT ALL
5. BEING SO RESTLESS THAT IT IS HARD TO SIT STILL: NOT AT ALL
7. FEELING AFRAID AS IF SOMETHING AWFUL MIGHT HAPPEN: NOT AT ALL

## 2018-05-15 ASSESSMENT — PATIENT HEALTH QUESTIONNAIRE - PHQ9: 5. POOR APPETITE OR OVEREATING: NOT AT ALL

## 2018-05-15 NOTE — PROGRESS NOTES
"  SUBJECTIVE:                                                    Rayray Contreras is a 22 year old female who presents to clinic today for the following health issues:      Due for pap. Also requesting thyroid checked, has been having pain on Right side of neck - had upper endoscopy 05/01/2018 - pain ever since.    Patient denies nay new symptoms.      Lab Results   Component Value Date    PAP NIL 10/13/2015       Problem list and histories reviewed & adjusted, as indicated.  Additional history: as documented      ROS:  Constitutional, HEENT, cardiovascular, pulmonary, GI, , musculoskeletal, neuro, skin, endocrine and psych systems are negative, except as otherwise noted.    OBJECTIVE:                                                    /68 (BP Location: Left arm, Patient Position: Chair, Cuff Size: Adult Regular)  Pulse 64  Temp 98.9  F (37.2  C) (Oral)  Ht 5' 2\" (1.575 m)  Wt 177 lb (80.3 kg)  LMP 04/27/2018 (Approximate)  SpO2 99%  Breastfeeding? No  BMI 32.37 kg/m2  Body mass index is 32.37 kg/(m^2).  GENERAL: healthy, alert and no distress  EYES: Eyes grossly normal to inspection, PERRL and conjunctivae and sclerae normal  ABDOMEN: soft, nontender, no hepatosplenomegaly, no masses and bowel sounds normal   (female): normal female external genitalia, normal urethral meatus, vaginal mucosa, normal cervix/adnexa/uterus without masses or discharge  MS: no gross musculoskeletal defects noted, no edema  NEURO: Normal strength and tone, mentation intact and speech normal  PSYCH: mentation appears normal, affect normal/bright    Diagnostic Test Results:  PAP - pending     ASSESSMENT/PLAN:                                                      Rayray was seen today for gyn exam.    Diagnoses and all orders for this visit:    Screening for malignant neoplasm of cervix  -     Pap imaged thin layer screen only - recommended age 21 - 24 years    Abdominal pain, generalized    Other orders  -     Cancel: " TSH with free T4 reflex    - Patient had a normal TSH done in March.  No new symptoms and therefore can cancel this order.  She did not realize that this has been done.   - PAP done today.      -- I have discussed the patient's diagnosis, and my plan of treatment with the patient and/or family. Patient is aware to followup if symptoms do not improve.  Patient has been advised to be seen sooner or seek more immediate care if symptoms change or worsen.  Patient agrees with and understands the plan today.      See Patient Instructions        Yesi Dumont PA-C    [facility]

## 2018-05-15 NOTE — MR AVS SNAPSHOT
After Visit Summary   5/15/2018    Rayray Contreras    MRN: [id number]           Patient Information     Date Of Birth          1995        Visit Information        Provider Department      5/15/2018 9:20 AM Yesi Dumont PA-C [facility]        Today's Diagnoses     Screening for malignant neoplasm of cervix    -  1    Abdominal pain, generalized           Follow-ups after your visit        Follow-up notes from your care team     Return in about 1 year (around 5/15/2019) for Physical Exam, Routine Visit.      Who to contact     If you have questions or need follow up information about today's clinic visit or your schedule please contact [facility] directly at [phone].  Normal or non-critical lab and imaging results will be communicated to you by MyChart, letter or phone within 4 business days after the clinic has received the results. If you do not hear from us within 7 days, please contact the clinic through [employer] or phone. If you have a critical or abnormal lab result, we will notify you by phone as soon as possible.  Submit refill requests through [employer] or call your pharmacy and they will forward the refill request to us. Please allow 3 business days for your refill to be completed.          Additional Information About Your Visit        MyChart Information     [employer] gives you secure access to your electronic health record. If you see a primary care provider, you can also send messages to your care team and make appointments. If you have questions, please call your primary care clinic.  If you do not have a primary care provider, please call [phone] and they will assist you.        Care EveryWhere ID     This is your Care EveryWhere ID. This could be used by other organizations to access your [facility] medical records  [id number]        Your Vitals Were     Pulse Temperature Height Last Period Pulse Oximetry Breastfeeding?    64 98.9  F  "(37.2  C) (Oral) 5' 2\" (1.575 m) 04/27/2018 (Approximate) 99% No    BMI (Body Mass Index)                   32.37 kg/m2            Blood Pressure from Last 3 Encounters:   05/15/18 110/68   03/14/18 118/74   02/23/18 130/90    Weight from Last 3 Encounters:   05/15/18 177 lb (80.3 kg)   03/14/18 175 lb (79.4 kg)   02/23/18 178 lb 8 oz (81 kg)              We Performed the Following     Pap imaged thin layer screen only - recommended age 21 - 24 years          Today's Medication Changes          These changes are accurate as of 5/15/18 11:59 PM.  If you have any questions, ask your nurse or doctor.               These medicines have changed or have updated prescriptions.        Dose/Directions    omeprazole 20 MG CR capsule   Commonly known as:  priLOSEC   This may have changed:  how much to take   Used for:  Abdominal pain, generalized   Changed by:  Yesi Dumont, JORGE        Dose:  40 mg   Take 2 capsules (40 mg) by mouth daily   Quantity:  30 capsule   Refills:  1                Primary Care Provider Office Phone # Fax #    [facility] [phone] [address]  [place] [address]        Equal Access to Services     GONZALO BAUTISTA AH: Hadii uzair maynard hadasho Sohectorali, waaxda luqadaha, qaybta kaalmada adeegyada, waxay ed pacheco. So [facility] [phone].    ATENCIÓN: Si habla español, tiene a acosta disposición servicios gratuitos de asistencia lingüística. Llame al [phone].    We comply with applicable federal civil rights laws and [place] laws. We do not discriminate on the basis of race, color, national origin, age, disability, sex, sexual orientation, or gender identity.            Thank you!     Thank you for choosing [facility]  for your care. Our goal is always to provide you with excellent care. Hearing back from our patients is one way we can continue to improve our services. Please take a few minutes to complete the written survey " that you may receive in the mail after your visit with us. Thank you!             Your Updated Medication List - Protect others around you: Learn how to safely use, store and throw away your medicines at www.disposemymeds.org.          This list is accurate as of 5/15/18 11:59 PM.  Always use your most recent med list.                   Brand Name Dispense Instructions for use Diagnosis    levonorgestrel-ethinyl estradiol 0.15-0.03 MG per tablet    SEASONALE    91 tablet    Take 1 tablet by mouth daily Must make appointment for future refills.    PCOS (polycystic ovarian syndrome)       omeprazole 20 MG CR capsule    priLOSEC    30 capsule    Take 2 capsules (40 mg) by mouth daily    Abdominal pain, generalized

## 2018-05-16 ASSESSMENT — PATIENT HEALTH QUESTIONNAIRE - PHQ9: SUM OF ALL RESPONSES TO PHQ QUESTIONS 1-9: 3

## 2018-05-16 ASSESSMENT — ANXIETY QUESTIONNAIRES: GAD7 TOTAL SCORE: 2

## 2018-05-17 LAB
COPATH REPORT: NORMAL
PAP: NORMAL

## 2018-05-29 ENCOUNTER — MYC REFILL (OUTPATIENT)
Dept: FAMILY MEDICINE | Facility: CLINIC | Age: 23
End: 2018-05-29

## 2018-05-29 DIAGNOSIS — E28.2 PCOS (POLYCYSTIC OVARIAN SYNDROME): ICD-10-CM

## 2018-05-29 RX ORDER — LEVONORGESTREL AND ETHINYL ESTRADIOL 0.15-0.03
1 KIT ORAL DAILY
Qty: 91 TABLET | Refills: 3 | Status: CANCELLED | OUTPATIENT
Start: 2018-05-29

## 2018-05-29 NOTE — TELEPHONE ENCOUNTER
Message from [employer]:  Original authorizing provider: JORGE Colon would like a refill of the following medications:  levonorgestrel-ethinyl estradiol (SEASONALE) 0.15-0.03 MG per tablet [Yesi Dumont PA-C]    Preferred pharmacy: [facility] [address] [place] [address] [address] AT [address] [address] & [facility]    Comment:

## 2018-05-29 NOTE — TELEPHONE ENCOUNTER
Refills available at pharmacy.  Patient advised to contact pharmacy.    Margo Linder, KEITH, RN, N  [facility]) [phone]

## 2018-05-30 NOTE — TELEPHONE ENCOUNTER
"Requested Prescriptions   Pending Prescriptions Disp Refills     QUASENSE 0.15-0.03 MG per tablet [Pharmacy Med Name: QUASENSE TABLETS 91S]  Patient previously received : levonorgestrel-ethinyl estradiol (SEASONALE) 0.15-0.03 MG per tablet  Last Written Prescription Date:  3/14/2018  Last Fill Quantity: 91,  # refills: 3   Last office visit: 5/15/2018 with prescribing provider:  Julia     Future Office Visit:     91 tablet 0     Sig: TAKE 1 TABLET BY MOUTH EVERY DAY      Contraceptives Protocol Passed    5/29/2018  2:03 PM       Passed - Patient is not a current smoker if age is 35 or older       Passed - Recent (12 mo) or future (30 days) visit within the authorizing provider's specialty    Patient had office visit in the last 12 months or has a visit in the next 30 days with authorizing provider or within the authorizing provider's specialty.  See \"Patient Info\" tab in inbasket, or \"Choose Columns\" in Meds & Orders section of the refill encounter.           Passed - No active pregnancy on record       Passed - No positive pregnancy test in past 12 months          "

## 2018-05-30 NOTE — TELEPHONE ENCOUNTER
"Requested Prescriptions   Pending Prescriptions Disp Refills     QUASENSE 0.15-0.03 MG per tablet [Pharmacy Med Name: QUASENSE TABLETS 91S] 91 tablet 0      Last Written Prescription Date:  3.14.18  Last Fill Quantity: 91,  # refills: 3   Last Office Visit: 5/15/2018   Future Office Visit:      Sig: TAKE 1 TABLET BY MOUTH EVERY DAY    Contraceptives Protocol Passed    5/30/2018 10:19 AM       Passed - Patient is not a current smoker if age is 35 or older       Passed - Recent (12 mo) or future (30 days) visit within the authorizing provider's specialty    Patient had office visit in the last 12 months or has a visit in the next 30 days with authorizing provider or within the authorizing provider's specialty.  See \"Patient Info\" tab in inbasket, or \"Choose Columns\" in Meds & Orders section of the refill encounter.           Passed - No active pregnancy on record       Passed - No positive pregnancy test in past 12 months            "

## 2018-06-01 NOTE — TELEPHONE ENCOUNTER
Pt should not need a refill. No pharmacy change. Denied.  Elise Fleming RN  [facility]

## 2018-12-06 ENCOUNTER — TELEPHONE (OUTPATIENT)
Dept: OTHER | Facility: CLINIC | Age: 23
End: 2018-12-06

## 2018-12-06 NOTE — TELEPHONE ENCOUNTER
12/6/18      Call Regarding Onboarding [facility] other    Attempt 1    Message on voicemail    Comments: 0 dep      Outreach   Imelda Joiner

## 2018-12-13 ENCOUNTER — MYC REFILL (OUTPATIENT)
Dept: FAMILY MEDICINE | Facility: CLINIC | Age: 23
End: 2018-12-13

## 2018-12-13 DIAGNOSIS — E28.2 PCOS (POLYCYSTIC OVARIAN SYNDROME): ICD-10-CM

## 2018-12-14 RX ORDER — LEVONORGESTREL AND ETHINYL ESTRADIOL 0.15-0.03
1 KIT ORAL DAILY
Qty: 91 TABLET | Refills: 3 | OUTPATIENT
Start: 2018-12-14

## 2018-12-14 RX ORDER — LEVONORGESTREL AND ETHINYL ESTRADIOL 0.15-0.03
1 KIT ORAL DAILY
Qty: 91 TABLET | Refills: 0 | Status: SHIPPED | OUTPATIENT
Start: 2018-12-14 | End: 2019-08-26 | Stop reason: ALTCHOICE

## 2018-12-14 NOTE — TELEPHONE ENCOUNTER
"Requested Prescriptions   Pending Prescriptions Disp Refills     levonorgestrel-ethinyl estradiol (SEASONALE) 0.15-0.03 MG tablet 91 tablet 3     Sig: Take 1 tablet by mouth daily Must make appointment for future refills.    Contraceptives Protocol Passed - 12/13/2018 10:03 PM       Passed - Patient is not a current smoker if age is 35 or older       Passed - Recent (12 mo) or future (30 days) visit within the authorizing provider's specialty    Patient had office visit in the last 12 months or has a visit in the next 30 days with authorizing provider or within the authorizing provider's specialty.  See \"Patient Info\" tab in inbasket, or \"Choose Columns\" in Meds & Orders section of the refill encounter.             Passed - No active pregnancy on record       Passed - No positive pregnancy test in past 12 months        Prescription approved per [facility] Refill Protocol.  Elise Fleming RN  [facility]    "

## 2018-12-17 NOTE — TELEPHONE ENCOUNTER
12/17/2018    Call Regarding [employer]boarding [employer] other     Attempt 2    Message on voicemail    Comments:       Outreach   Raquel Maier

## 2018-12-27 NOTE — TELEPHONE ENCOUNTER
12/27/2018    Call Regarding Onboarding [employer]    Attempt 3    Message on voicemail     Comments:       Outreach   CWR

## 2019-01-02 ENCOUNTER — TRANSFERRED RECORDS (OUTPATIENT)
Dept: HEALTH INFORMATION MANAGEMENT | Facility: CLINIC | Age: 24
End: 2019-01-02

## 2019-01-25 ENCOUNTER — NURSE TRIAGE (OUTPATIENT)
Dept: NURSING | Facility: CLINIC | Age: 24
End: 2019-01-25

## 2019-01-25 NOTE — TELEPHONE ENCOUNTER
Rayray had her nose cauterized at an [facility] yesterday. She said her nose has continued to bleed since. She wanted to know what she should do.  I suggested she follow the discharge instructions given her at the hospital.    She stated understanding and agreement.  Elysia TAYLOR RN [facility]

## 2019-03-14 DIAGNOSIS — E28.2 PCOS (POLYCYSTIC OVARIAN SYNDROME): ICD-10-CM

## 2019-03-15 RX ORDER — LEVONORGESTREL AND ETHINYL ESTRADIOL 0.15-0.03
KIT ORAL
Qty: 91 TABLET | Refills: 0 | Status: SHIPPED | OUTPATIENT
Start: 2019-03-15 | End: 2019-06-16

## 2019-03-15 NOTE — TELEPHONE ENCOUNTER
"Requested Prescriptions   Pending Prescriptions Disp Refills     INTROVALE 0.15-0.03 MG tablet [Pharmacy Med Name: INTROVALE TABLETS] 91 tablet 0      Last Written Prescription Date:  12.14.18  Last Fill Quantity: 91 tablet,  # refills: 0   Last office visit: 5/15/2018 with prescribing provider:  CATHY Trinidad       Future Office Visit:       Sig: TAKE 1 TABLET BY MOUTH DAILY    Contraceptives Protocol Passed - 3/14/2019 12:41 PM       Passed - Patient is not a current smoker if age is 35 or older       Passed - Recent (12 mo) or future (30 days) visit within the authorizing provider's specialty    Patient had office visit in the last 12 months or has a visit in the next 30 days with authorizing provider or within the authorizing provider's specialty.  See \"Patient Info\" tab in inbasket, or \"Choose Columns\" in Meds & Orders section of the refill encounter.             Passed - Medication is active on med list       Passed - No active pregnancy on record       Passed - No positive pregnancy test in past 12 months        "

## 2019-03-17 DIAGNOSIS — E28.2 PCOS (POLYCYSTIC OVARIAN SYNDROME): ICD-10-CM

## 2019-03-19 RX ORDER — LEVONORGESTREL AND ETHINYL ESTRADIOL 0.15-0.03
KIT ORAL
Qty: 91 TABLET | Refills: 0 | OUTPATIENT
Start: 2019-03-19

## 2019-03-20 NOTE — TELEPHONE ENCOUNTER
Order was sent on 3/15/2019.    Margo Linder, KEITH, RN, N  [facility]) [phone]

## 2019-04-15 ENCOUNTER — TRANSFERRED RECORDS (OUTPATIENT)
Dept: HEALTH INFORMATION MANAGEMENT | Facility: CLINIC | Age: 24
End: 2019-04-15

## 2019-06-16 ENCOUNTER — MYC REFILL (OUTPATIENT)
Dept: FAMILY MEDICINE | Facility: CLINIC | Age: 24
End: 2019-06-16

## 2019-06-16 DIAGNOSIS — E28.2 PCOS (POLYCYSTIC OVARIAN SYNDROME): ICD-10-CM

## 2019-06-16 NOTE — LETTER
[facility]  [address] [place] [address], [place] [address]                                                                                                       [phone]    June 24, 2019    Rayray Contreras  [address]  [place] [address]      To Whom it May Concern:    We have been calling you regarding a recent refill request we received for Introvale.  Unfortunately, we were unable to reach you.  We are notifying you that you are due for an Office Visit prior to your next refill.  You can schedule this appointment via [employer] or by calling the clinic at [phone].      Sincerely,  [facility]

## 2019-06-17 NOTE — TELEPHONE ENCOUNTER
"Requested Prescriptions   Pending Prescriptions Disp Refills     levonorgestrel-ethinyl estradiol (INTROVALE) 0.15-0.03 MG tablet      Last Written Prescription Date:  12.14.18  Last Fill Quantity: 91 tablet,  # refills: 0   Last office visit: 5/15/2018 with prescribing provider:  CATHY Trinidad         Future Office Visit:       91 tablet 0     Sig: Take 1 tablet by mouth daily       Contraceptives Protocol Failed - 6/16/2019  6:35 AM        Failed - Recent (12 mo) or future (30 days) visit within the authorizing provider's specialty     Patient had office visit in the last 12 months or has a visit in the next 30 days with authorizing provider or within the authorizing provider's specialty.  See \"Patient Info\" tab in inbasket, or \"Choose Columns\" in Meds & Orders section of the refill encounter.              Passed - Patient is not a current smoker if age is 35 or older        Passed - Medication is active on med list        Passed - No active pregnancy on record        Passed - No positive pregnancy test in past 12 months        "

## 2019-06-17 NOTE — TELEPHONE ENCOUNTER
Routing refill request to provider for review/approval because:  Kathleen given x1 and patient did not follow up, please advise    Liyah TABARES RN  [facility]

## 2019-06-18 RX ORDER — LEVONORGESTREL AND ETHINYL ESTRADIOL 0.15-0.03
1 KIT ORAL DAILY
Qty: 30 TABLET | Refills: 0 | Status: SHIPPED | OUTPATIENT
Start: 2019-06-18 | End: 2019-08-26

## 2019-06-18 NOTE — TELEPHONE ENCOUNTER
One month Rx done, but please notify patient that she is due for an office visit for any further refills.

## 2019-06-18 NOTE — TELEPHONE ENCOUNTER
Left non-detailed message for patient to call back.  Please schedule follow up when patient calls back.  (see previous notes for details)    Sherita Caraballo

## 2019-06-24 NOTE — TELEPHONE ENCOUNTER
Left non-detailed message for patient to call back.  Please schedule follow up when patient calls back.  (see previous notes for details)    We have been calling you regarding a recent refill request we received for Introvale.  Unfortunately, we were unable to reach you.  We are notifying you that you are due for an office visit  prior to your next refill.  You can schedule this appointment via [employer] or by calling the clinic at [phone].        Thanks Elysia

## 2019-07-08 ENCOUNTER — MYC MEDICAL ADVICE (OUTPATIENT)
Dept: FAMILY MEDICINE | Facility: CLINIC | Age: 24
End: 2019-07-08

## 2019-07-08 NOTE — TELEPHONE ENCOUNTER
Will close encounter as this is a response to a previous MyChart encounter. Rosario Christian R.N.

## 2019-07-08 NOTE — TELEPHONE ENCOUNTER
Please see [employer] message and advise thanks.     Beti Barone RN, BSN  [facility]

## 2019-08-26 ENCOUNTER — OFFICE VISIT (OUTPATIENT)
Dept: FAMILY MEDICINE | Facility: CLINIC | Age: 24
End: 2019-08-26
Payer: COMMERCIAL

## 2019-08-26 VITALS
TEMPERATURE: 99.4 F | DIASTOLIC BLOOD PRESSURE: 64 MMHG | HEART RATE: 104 BPM | BODY MASS INDEX: 32.39 KG/M2 | HEIGHT: 62 IN | SYSTOLIC BLOOD PRESSURE: 122 MMHG | OXYGEN SATURATION: 100 % | WEIGHT: 176 LBS

## 2019-08-26 DIAGNOSIS — F32.5 MAJOR DEPRESSION IN COMPLETE REMISSION (H): ICD-10-CM

## 2019-08-26 DIAGNOSIS — Z00.00 ROUTINE GENERAL MEDICAL EXAMINATION AT A HEALTH CARE FACILITY: Primary | ICD-10-CM

## 2019-08-26 DIAGNOSIS — E28.2 PCOS (POLYCYSTIC OVARIAN SYNDROME): ICD-10-CM

## 2019-08-26 DIAGNOSIS — Z13.6 CARDIOVASCULAR SCREENING; LDL GOAL LESS THAN 160: ICD-10-CM

## 2019-08-26 DIAGNOSIS — E66.811 CLASS 1 OBESITY DUE TO EXCESS CALORIES WITHOUT SERIOUS COMORBIDITY WITH BODY MASS INDEX (BMI) OF 32.0 TO 32.9 IN ADULT: ICD-10-CM

## 2019-08-26 DIAGNOSIS — Z11.4 ENCOUNTER FOR SCREENING FOR HIV: ICD-10-CM

## 2019-08-26 DIAGNOSIS — R62.50 DEVELOPMENTAL DELAY: ICD-10-CM

## 2019-08-26 DIAGNOSIS — E66.09 CLASS 1 OBESITY DUE TO EXCESS CALORIES WITHOUT SERIOUS COMORBIDITY WITH BODY MASS INDEX (BMI) OF 32.0 TO 32.9 IN ADULT: ICD-10-CM

## 2019-08-26 PROCEDURE — 99395 PREV VISIT EST AGE 18-39: CPT | Performed by: PHYSICIAN ASSISTANT

## 2019-08-26 RX ORDER — LEVONORGESTREL AND ETHINYL ESTRADIOL 0.15-0.03
1 KIT ORAL DAILY
Qty: 91 TABLET | Refills: 3 | Status: SHIPPED | OUTPATIENT
Start: 2019-08-26 | End: 2020-08-25

## 2019-08-26 ASSESSMENT — PATIENT HEALTH QUESTIONNAIRE - PHQ9
5. POOR APPETITE OR OVEREATING: NOT AT ALL
SUM OF ALL RESPONSES TO PHQ QUESTIONS 1-9: 2

## 2019-08-26 ASSESSMENT — MIFFLIN-ST. JEOR: SCORE: 1491.58

## 2019-08-26 ASSESSMENT — ANXIETY QUESTIONNAIRES
7. FEELING AFRAID AS IF SOMETHING AWFUL MIGHT HAPPEN: NOT AT ALL
3. WORRYING TOO MUCH ABOUT DIFFERENT THINGS: NOT AT ALL
6. BECOMING EASILY ANNOYED OR IRRITABLE: SEVERAL DAYS
2. NOT BEING ABLE TO STOP OR CONTROL WORRYING: NOT AT ALL
GAD7 TOTAL SCORE: 1
IF YOU CHECKED OFF ANY PROBLEMS ON THIS QUESTIONNAIRE, HOW DIFFICULT HAVE THESE PROBLEMS MADE IT FOR YOU TO DO YOUR WORK, TAKE CARE OF THINGS AT HOME, OR GET ALONG WITH OTHER PEOPLE: NOT DIFFICULT AT ALL
1. FEELING NERVOUS, ANXIOUS, OR ON EDGE: NOT AT ALL
5. BEING SO RESTLESS THAT IT IS HARD TO SIT STILL: NOT AT ALL

## 2019-08-26 NOTE — PROGRESS NOTES
SUBJECTIVE:   CC: Rayray Contreras is an 24 year old woman who presents for preventive health visit.     Healthy Habits:    Do you get at least three servings of calcium containing foods daily (dairy, green leafy vegetables, etc.)? yes    Amount of exercise or daily activities, outside of work: 7 day(s) per week- active job as     Problems taking medications regularly No    Medication side effects: No    Have you had an eye exam in the past two years? no    Do you see a dentist twice per year? yes    Do you have sleep apnea, excessive snoring or daytime drowsiness? no     Refill OCPs - started for cyst on her ovary and hormonal control about 10 yrs ago. Continuous dosing so only has period every 3 months.   Then has stayed on it ever since for hormone regulation. Epic review shows hx of PCOS. Wondering if she should have an IUD as sometimes will forget to take it. Usually has no issues on the weekdays, but sometimes on the weekends will forget as her schedule is a bit different. No current sexual partner. Not currently dating anymore - was waiting til marriage to have intercourse either way. Has never had a sexual partner.    Not fasting for labs.     Hx of developmental delay - Prolonged delivery with vacuum extractor. [facility] for 14 days. Oxygen first couple days. Pt reports she did require IEP in school, but then went through [employer] on her own. Reports she was recommended to have assistance, but she refused and has done well on her own. Was told she wouldn't be able to go to school or drive and has been able to do all of these things.      Today's PHQ-2 Score: PHQ 9   PHQ-2 ( 1999 [employer]) 3/11/2018 2/20/2017   Q1: Little interest or pleasure in doing things 0 -   Q2: Feeling down, depressed or hopeless 0 -   PHQ-2 Score 0 -   Q1: Little interest or pleasure in doing things Not at all Not at all   Q2: Feeling down, depressed or hopeless Not at all Not at all   PHQ-2 Score 0 0    Reports remote hx of depression, but has not been on wellbutrin for past few years and feels like mood is going well. Declines further intervention at this time.    Abuse: Current or Past(Physical, Sexual or Emotional)- NO  Do you feel safe in your environment? YES    Social History     Tobacco Use     Smoking status: Never Smoker     Smokeless tobacco: Never Used   Substance Use Topics     Alcohol use: No     Alcohol/week: 0.0 oz     Comment: once every few months      If you drink alcohol do you typically have >3 drinks per day or >7 drinks per week? No                     Reviewed orders with patient.  Reviewed health maintenance and updated orders accordingly - Yes  Patient Active Problem List   Diagnosis     Developmental delay     Class 1 obesity due to excess calories without serious comorbidity with body mass index (BMI) of 32.0 to 32.9 in adult     PCOS (polycystic ovarian syndrome)     Major depression in complete remission (H)     Health Care Home     Past Surgical History:   Procedure Laterality Date     NO HISTORY OF SURGERY         Social History     Tobacco Use     Smoking status: Never Smoker     Smokeless tobacco: Never Used   Substance Use Topics     Alcohol use: No     Alcohol/week: 0.0 oz     Comment: once every few months      Family History   Problem Relation Age of Onset     Arthritis Mother      Diabetes Maternal Grandmother      Colon Cancer Maternal Grandmother         passed from - around 80 years old     Arthritis Maternal Grandmother      Cerebrovascular Disease Maternal Grandmother      Coronary Artery Disease Maternal Grandmother      Diabetes Maternal Grandfather      Cerebrovascular Disease Maternal Grandfather      Coronary Artery Disease Maternal Grandfather      Cerebrovascular Disease Paternal Grandmother      Diabetes Paternal Grandmother      Coronary Artery Disease Paternal Grandmother      Cerebrovascular Disease Paternal Grandfather      Diabetes Paternal Grandfather       "Coronary Artery Disease Paternal Grandfather      Breast Cancer No family hx of      Thyroid Disease No family hx of          Current Outpatient Medications   Medication Sig Dispense Refill     levonorgestrel-ethinyl estradiol (INTROVALE) 0.15-0.03 MG tablet Take 1 tablet by mouth daily 91 tablet 3     Allergies   Allergen Reactions     Chicken-Derived Products (Egg)      Other reaction(s): GI Upset       Mammogram not appropriate for this patient based on age.    Pertinent mammograms are reviewed under the imaging tab.  History of abnormal Pap smear: NO - age 21-29 PAP every 3 years recommended  PAP / HPV 5/15/2018 10/13/2015   PAP NIL NIL     Reviewed and updated as needed this visit by clinical staff  Tobacco  Allergies  Meds  Problems  Med Hx  Surg Hx  Fam Hx  Soc Hx          Reviewed and updated as needed this visit by Provider  Tobacco  Allergies  Meds  Problems  Med Hx  Surg Hx  Fam Hx  Soc Hx             ROS:  CONSTITUTIONAL: NEGATIVE for fever, chills, change in weight  INTEGUMENTARU/SKIN: NEGATIVE for worrisome rashes, moles or lesions  EYES: NEGATIVE for vision changes or irritation  ENT: NEGATIVE for ear, mouth and throat problems  RESP: NEGATIVE for significant cough or SOB  BREAST: NEGATIVE for masses, tenderness or discharge  CV: NEGATIVE for chest pain, palpitations or peripheral edema  GI: NEGATIVE for nausea, abdominal pain, heartburn, or change in bowel habits  : NEGATIVE for unusual urinary or vaginal symptoms. Periods every 3 months on OCPs.  MUSCULOSKELETAL: NEGATIVE for significant arthralgias or myalgia  NEURO: NEGATIVE for weakness, dizziness or paresthesias  PSYCHIATRIC: NEGATIVE for changes in mood or affect    OBJECTIVE:   /64   Pulse 104   Temp 99.4  F (37.4  C) (Oral)   Ht 1.575 m (5' 2\")   Wt 79.8 kg (176 lb)   LMP 07/29/2019 (Approximate)   SpO2 100%   BMI 32.19 kg/m    EXAM:  GENERAL: healthy, alert and no distress  EYES: Eyes grossly normal to " inspection, PERRL and conjunctivae and sclerae normal  HENT: ear canals and TM's normal, nose and mouth without ulcers or lesions  NECK: no adenopathy, no asymmetry, masses, or scars and thyroid normal to palpation  RESP: lungs clear to auscultation - no rales, rhonchi or wheezes  BREAST: normal without masses, tenderness or nipple discharge and no palpable axillary masses or adenopathy  CV: regular rate and rhythm, normal S1 S2, no S3 or S4, no murmur, click or rub, no peripheral edema and peripheral pulses strong  ABDOMEN: soft, nontender, no hepatosplenomegaly, no masses and bowel sounds normal  MS: no gross musculoskeletal defects noted, no edema  SKIN: no suspicious lesions or rashes  NEURO: Normal strength and tone, mentation intact and speech normal  PSYCH: mentation appears normal, affect normal/bright    Diagnostic Test Results:  Labs reviewed in Epic    ASSESSMENT/PLAN:       ICD-10-CM    1. Routine general medical examination at a health care facility Z00.00    2. PCOS (polycystic ovarian syndrome) E28.2 levonorgestrel-ethinyl estradiol (INTROVALE) 0.15-0.03 MG tablet     **Glucose FUTURE 2mo   3. Encounter for screening for HIV Z11.4 **HIV Antigen Antibody Combo FUTURE 2mo   4. CARDIOVASCULAR SCREENING; LDL GOAL LESS THAN 160 Z13.6 Lipid panel reflex to direct LDL Fasting   5. Major depression in complete remission (H) F32.5    6. Class 1 obesity due to excess calories without serious comorbidity with body mass index (BMI) of 32.0 to 32.9 in adult E66.09     Z68.32    7. Developmental delay R62.50    Very stable on OCPs for hx of PCOS. Risks/benefits/appropriate use of medication reviewed.  Denies any sexual activity so declines GC screening. Admits she does forget to take on occasional though and wonders about option for IUD. Reviewed that combo OCPs are mainstay for PCOS management, but we could refer to OB/GYN to discuss this further and would advise switching methods in future if she ever does become  "sexually active and feels she is not having good compliance. Pt would like to stay on OCPs for now, but will let me know if anything changes.  Due for preventative health labs and routine HIV screening and is amenable to these.  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 Encouraged to follow-up for resources if ever feels she is regressing or has concerns.  Hx of depression as well, but PHQ/KWAKU are normal and pt denies and concerns and declines further intervention at this time.      COUNSELING:   Reviewed preventive health counseling, as reflected in patient instructions       Regular exercise       Healthy diet/nutrition       Contraception       Safe sex practices/STD prevention       HIV screeninx in teen years, 1x in adult years, and at intervals if high risk    Estimated body mass index is 32.19 kg/m  as calculated from the following:    Height as of this encounter: 1.575 m (5' 2\").    Weight as of this encounter: 79.8 kg (176 lb).    Weight management plan: Discussed healthy diet and exercise guidelines     reports that she has never smoked. She has never used smokeless tobacco.      Counseling Resources:  ATP IV Guidelines  Pooled Cohorts Equation Calculator  Breast Cancer Risk Calculator  FRAX Risk Assessment  ICSI Preventive Guidelines  Dietary Guidelines for Americans,   USDA's MyPlate  ASA Prophylaxis  Lung CA Screening    Claudia Nicole PA-C  [facility]  "

## 2019-08-27 ASSESSMENT — ANXIETY QUESTIONNAIRES: GAD7 TOTAL SCORE: 1

## 2019-09-16 DIAGNOSIS — Z11.4 ENCOUNTER FOR SCREENING FOR HIV: ICD-10-CM

## 2019-09-16 DIAGNOSIS — Z13.6 CARDIOVASCULAR SCREENING; LDL GOAL LESS THAN 160: ICD-10-CM

## 2019-09-16 DIAGNOSIS — E28.2 PCOS (POLYCYSTIC OVARIAN SYNDROME): ICD-10-CM

## 2019-09-16 PROCEDURE — 82947 ASSAY GLUCOSE BLOOD QUANT: CPT | Performed by: PHYSICIAN ASSISTANT

## 2019-09-16 PROCEDURE — 87389 HIV-1 AG W/HIV-1&-2 AB AG IA: CPT | Performed by: PHYSICIAN ASSISTANT

## 2019-09-16 PROCEDURE — 80061 LIPID PANEL: CPT | Performed by: PHYSICIAN ASSISTANT

## 2019-09-16 PROCEDURE — 36415 COLL VENOUS BLD VENIPUNCTURE: CPT | Performed by: PHYSICIAN ASSISTANT

## 2019-09-17 LAB
CHOLEST SERPL-MCNC: 164 MG/DL
GLUCOSE SERPL-MCNC: 72 MG/DL (ref 70–99)
HDLC SERPL-MCNC: 52 MG/DL
LDLC SERPL CALC-MCNC: 103 MG/DL
NONHDLC SERPL-MCNC: 112 MG/DL
TRIGL SERPL-MCNC: 43 MG/DL

## 2019-09-18 LAB — HIV 1+2 AB+HIV1 P24 AG SERPL QL IA: NONREACTIVE

## 2019-09-26 NOTE — RESULT ENCOUNTER NOTE
Dear Rayray,      Your recent test results are noted below:    -Cholesterol levels (LDL,HDL, Triglycerides) are normal. ADVISE: rechecking in 5 years.  -Glucose (diabetic screening test) is normal.  -HIV test is normal.    For additional lab test information, [email or web address].[email or web address] is an excellent reference. Please contact the clinic at [phone] with any further questions or concerns.    Sincerely,      Claudia Nicole PA-C  [facility]

## 2019-11-06 ENCOUNTER — OFFICE VISIT (OUTPATIENT)
Dept: URGENT CARE | Facility: URGENT CARE | Age: 24
End: 2019-11-06
Payer: COMMERCIAL

## 2019-11-06 VITALS
HEART RATE: 68 BPM | HEIGHT: 62 IN | OXYGEN SATURATION: 100 % | SYSTOLIC BLOOD PRESSURE: 124 MMHG | DIASTOLIC BLOOD PRESSURE: 82 MMHG | WEIGHT: 176 LBS | BODY MASS INDEX: 32.39 KG/M2 | TEMPERATURE: 99.2 F | RESPIRATION RATE: 16 BRPM

## 2019-11-06 DIAGNOSIS — J01.90 ACUTE SINUSITIS, RECURRENCE NOT SPECIFIED, UNSPECIFIED LOCATION: Primary | ICD-10-CM

## 2019-11-06 PROCEDURE — 99213 OFFICE O/P EST LOW 20 MIN: CPT | Performed by: FAMILY MEDICINE

## 2019-11-06 RX ORDER — AZITHROMYCIN 250 MG/1
TABLET, FILM COATED ORAL
Qty: 6 TABLET | Refills: 0 | Status: SHIPPED | OUTPATIENT
Start: 2019-11-06 | End: 2020-01-10

## 2019-11-06 RX ORDER — PREDNISONE 20 MG/1
40 TABLET ORAL DAILY
Qty: 10 TABLET | Refills: 0 | Status: SHIPPED | OUTPATIENT
Start: 2019-11-06 | End: 2020-01-10

## 2019-11-06 ASSESSMENT — MIFFLIN-ST. JEOR: SCORE: 1491.58

## 2019-11-06 NOTE — LETTER
[facility]  [address]  [facility] [address]  Phone: [phone]  Fax: [phone]    November 6, 2019        Rayray Contreras  [address]  [place] [address]          To whom it may concern:    RE: Rayray Contreras    Patient was seen and treated today at our clinic and missed work. Unable to work on 11/7/19.    Please contact me for questions or concerns.      Sincerely,        Mario Wright MD

## 2019-11-27 NOTE — PROGRESS NOTES
SUBJECTIVE: Rayray Contreras is a 24 year old female patient complaining of sinus congestion for 10 day(s).     OBJECTIVE: The patient appears alert and moderate distress.   EARS: External ears normal. Canals clear. TM's normal.  NOSE/SINUS: positive findings: mucosa erythematous and swollen  Sinus palpation: Maxillary sinus tender to palpation   THROAT: moderate erythema   NECK:positive findings: moderate anterior cervical nodes   CHEST: Clear    ASSESSMENT: Acute Sinusitis    PLAN: See orders.   In addition, I have suggested that the patient   Push fluids.

## 2020-01-10 ENCOUNTER — OFFICE VISIT (OUTPATIENT)
Dept: FAMILY MEDICINE | Facility: CLINIC | Age: 25
End: 2020-01-10
Payer: COMMERCIAL

## 2020-01-10 VITALS
WEIGHT: 179 LBS | SYSTOLIC BLOOD PRESSURE: 120 MMHG | BODY MASS INDEX: 32.94 KG/M2 | HEIGHT: 62 IN | HEART RATE: 96 BPM | OXYGEN SATURATION: 100 % | DIASTOLIC BLOOD PRESSURE: 72 MMHG | TEMPERATURE: 98.8 F

## 2020-01-10 DIAGNOSIS — J06.9 VIRAL UPPER RESPIRATORY INFECTION: ICD-10-CM

## 2020-01-10 DIAGNOSIS — R07.0 THROAT PAIN: Primary | ICD-10-CM

## 2020-01-10 LAB
DEPRECATED S PYO AG THROAT QL EIA: NORMAL
FLUAV+FLUBV AG SPEC QL: NEGATIVE
FLUAV+FLUBV AG SPEC QL: NEGATIVE
SPECIMEN SOURCE: NORMAL
SPECIMEN SOURCE: NORMAL

## 2020-01-10 PROCEDURE — 99213 OFFICE O/P EST LOW 20 MIN: CPT | Performed by: NURSE PRACTITIONER

## 2020-01-10 PROCEDURE — 87081 CULTURE SCREEN ONLY: CPT | Performed by: NURSE PRACTITIONER

## 2020-01-10 PROCEDURE — 87804 INFLUENZA ASSAY W/OPTIC: CPT | Performed by: NURSE PRACTITIONER

## 2020-01-10 PROCEDURE — 87880 STREP A ASSAY W/OPTIC: CPT | Performed by: NURSE PRACTITIONER

## 2020-01-10 ASSESSMENT — MIFFLIN-ST. JEOR: SCORE: 1505.19

## 2020-01-10 NOTE — PROGRESS NOTES
Subjective     Rayray Contreras is a 24 year old female who presents to clinic today for the following health issues:    HPI   Acute Illness   Acute illness concerns: Cough  Onset: x 1 day ago cough    Fever: no     Chills/Sweats: YES- sweats    Headache (location?): YES    Sinus Pressure:no    Conjunctivitis:  no    Ear Pain: no    Rhinorrhea: YES    Congestion: no     Sore Throat: YES     Cough: YES    Wheeze: YES- x 1 day ago    Decreased Appetite: no     Nausea: no     Vomiting: no     Diarrhea:  no     Dysuria/Freq.: no     Fatigue/Achiness: YES    Sick/Strep Exposure: YES- Influenza A - mom and sister were recently diagnosed.       Therapies Tried and outcome: Tylenol sinus    Patient Active Problem List   Diagnosis     Developmental delay     Class 1 obesity due to excess calories without serious comorbidity with body mass index (BMI) of 32.0 to 32.9 in adult     PCOS (polycystic ovarian syndrome)     Major depression in complete remission (H)     Health Care Home     Past Surgical History:   Procedure Laterality Date     NO HISTORY OF SURGERY         Social History     Tobacco Use     Smoking status: Never Smoker     Smokeless tobacco: Never Used   Substance Use Topics     Alcohol use: No     Alcohol/week: 0.0 standard drinks     Comment: once every few months      Family History   Problem Relation Age of Onset     Arthritis Mother      Diabetes Maternal Grandmother      Colon Cancer Maternal Grandmother         passed from - around 80 years old     Arthritis Maternal Grandmother      Cerebrovascular Disease Maternal Grandmother      Coronary Artery Disease Maternal Grandmother      Diabetes Maternal Grandfather      Cerebrovascular Disease Maternal Grandfather      Coronary Artery Disease Maternal Grandfather      Cerebrovascular Disease Paternal Grandmother      Diabetes Paternal Grandmother      Coronary Artery Disease Paternal Grandmother      Cerebrovascular Disease Paternal Grandfather      Diabetes  "Paternal Grandfather      Coronary Artery Disease Paternal Grandfather      Breast Cancer No family hx of      Thyroid Disease No family hx of          Current Outpatient Medications   Medication Sig Dispense Refill     levonorgestrel-ethinyl estradiol (INTROVALE) 0.15-0.03 MG tablet Take 1 tablet by mouth daily 91 tablet 3     Allergies   Allergen Reactions     Chicken-Derived Products (Egg)      Other reaction(s): GI Upset       Reviewed and updated as needed this visit by Provider         Review of Systems   ROS COMP: Constitutional, HEENT, cardiovascular, pulmonary, gi and gu systems are negative, except as otherwise noted.      Objective    /72 (BP Location: Right arm, Patient Position: Sitting, Cuff Size: Adult Regular)   Pulse 96   Temp 98.8  F (37.1  C) (Oral)   Ht 1.575 m (5' 2\")   Wt 81.2 kg (179 lb)   SpO2 100%   BMI 32.74 kg/m    Body mass index is 32.74 kg/m .  Physical Exam   GENERAL: healthy, alert and no distress  EYES: Eyes grossly normal to inspection, PERRL and conjunctivae and sclerae normal  HENT: ear canals and TM's normal, nose with congestion and mouth without ulcers or lesions  NECK: no adenopathy, no asymmetry, masses  RESP: lungs clear to auscultation - no rales, rhonchi or wheezes  CV: regular rate and rhythm, normal S1 S2, no S3 or S4, no murmur  PSYCH: mentation appears normal, affect normal/bright      Assessment & Plan     Rayray was seen today for cough.    Diagnoses and all orders for this visit:    Throat pain  -     Strep, Rapid Screen  -     Influenza A/B antigen  -     Beta strep group A culture  Results for orders placed or performed in visit on 01/10/20   Strep, Rapid Screen     Status: None   Result Value Ref Range    Specimen Description Throat     Rapid Strep A Screen       NEGATIVE: No Group A streptococcal antigen detected by immunoassay, await culture report.   Influenza A/B antigen     Status: None   Result Value Ref Range    Influenza A/B Agn Specimen " Nasal     Influenza A Negative NEG^Negative    Influenza B Negative NEG^Negative       Viral upper respiratory infection  Patient education completed regarding viral cause, typical course, symptomatic treatment and when to follow-up.    Tylenol sinus as needed and may take Ibuprofen, 600 mg every 6-8 hours (take with food).    Cough suppressant as needed.        Return in about 1 week (around 1/17/2020) for No improvement or sooner with worsening symptoms.    ELKIN Matias [facility]

## 2020-01-10 NOTE — PATIENT INSTRUCTIONS
Rayray was seen today for cough.    Diagnoses and all orders for this visit:    Throat pain  -     Strep, Rapid Screen  -     Influenza A/B antigen  -     Beta strep group A culture  Results for orders placed or performed in visit on 01/10/20   Strep, Rapid Screen     Status: None   Result Value Ref Range    Specimen Description Throat     Rapid Strep A Screen       NEGATIVE: No Group A streptococcal antigen detected by immunoassay, await culture report.   Influenza A/B antigen     Status: None   Result Value Ref Range    Influenza A/B Agn Specimen Nasal     Influenza A Negative NEG^Negative    Influenza B Negative NEG^Negative       Viral upper respiratory infection  Increase fluids and rest.   Tylenol sinus as needed and may take Ibuprofen, 600 mg every 6-8 hours (take with food).    Cough suppressant as needed.    Delsym cough syrup as needed.

## 2020-01-11 LAB
BACTERIA SPEC CULT: NORMAL
SPECIMEN SOURCE: NORMAL

## 2020-01-13 ENCOUNTER — MYC MEDICAL ADVICE (OUTPATIENT)
Dept: FAMILY MEDICINE | Facility: CLINIC | Age: 25
End: 2020-01-13

## 2020-01-13 NOTE — RESULT ENCOUNTER NOTE
Dear Rayray,     Strep culture was negative and reassuring.      Please send a [employer] message or call [phone]  if you have any questions.      Leslie Marino, ELKIN, CNP  [facility]    If you have further questions about the interpretation of your labs, [email or web address] is a good website to check out for further information.

## 2020-03-31 ENCOUNTER — TELEPHONE (OUTPATIENT)
Dept: FAMILY MEDICINE | Facility: CLINIC | Age: 25
End: 2020-03-31

## 2020-03-31 ENCOUNTER — OFFICE VISIT (OUTPATIENT)
Dept: FAMILY MEDICINE | Facility: CLINIC | Age: 25
End: 2020-03-31
Payer: COMMERCIAL

## 2020-03-31 VITALS
HEIGHT: 62 IN | SYSTOLIC BLOOD PRESSURE: 130 MMHG | WEIGHT: 179 LBS | OXYGEN SATURATION: 97 % | TEMPERATURE: 98 F | BODY MASS INDEX: 32.94 KG/M2 | HEART RATE: 89 BPM | DIASTOLIC BLOOD PRESSURE: 86 MMHG

## 2020-03-31 DIAGNOSIS — M54.6 ACUTE MIDLINE THORACIC BACK PAIN: Primary | ICD-10-CM

## 2020-03-31 DIAGNOSIS — Z02.89 ENCOUNTER FOR COMPLETION OF FORM WITH PATIENT: ICD-10-CM

## 2020-03-31 DIAGNOSIS — D68.00 VON WILLEBRAND'S DISEASE (H): ICD-10-CM

## 2020-03-31 PROCEDURE — 99214 OFFICE O/P EST MOD 30 MIN: CPT | Performed by: PHYSICIAN ASSISTANT

## 2020-03-31 RX ORDER — PREDNISONE 20 MG/1
TABLET ORAL
Qty: 20 TABLET | Refills: 0 | Status: SHIPPED | OUTPATIENT
Start: 2020-03-31 | End: 2020-04-30

## 2020-03-31 RX ORDER — SENNOSIDES 8.6 MG
650 CAPSULE ORAL 3 TIMES DAILY PRN
Start: 2020-03-31 | End: 2020-04-30

## 2020-03-31 RX ORDER — CYCLOBENZAPRINE HCL 10 MG
TABLET ORAL
Qty: 20 TABLET | Refills: 0 | Status: SHIPPED | OUTPATIENT
Start: 2020-03-31 | End: 2020-04-30

## 2020-03-31 ASSESSMENT — ANXIETY QUESTIONNAIRES
5. BEING SO RESTLESS THAT IT IS HARD TO SIT STILL: NOT AT ALL
2. NOT BEING ABLE TO STOP OR CONTROL WORRYING: NEARLY EVERY DAY
IF YOU CHECKED OFF ANY PROBLEMS ON THIS QUESTIONNAIRE, HOW DIFFICULT HAVE THESE PROBLEMS MADE IT FOR YOU TO DO YOUR WORK, TAKE CARE OF THINGS AT HOME, OR GET ALONG WITH OTHER PEOPLE: NOT DIFFICULT AT ALL
7. FEELING AFRAID AS IF SOMETHING AWFUL MIGHT HAPPEN: NOT AT ALL
GAD7 TOTAL SCORE: 9
3. WORRYING TOO MUCH ABOUT DIFFERENT THINGS: NOT AT ALL
1. FEELING NERVOUS, ANXIOUS, OR ON EDGE: NEARLY EVERY DAY
6. BECOMING EASILY ANNOYED OR IRRITABLE: NEARLY EVERY DAY

## 2020-03-31 ASSESSMENT — MIFFLIN-ST. JEOR: SCORE: 1505.19

## 2020-03-31 ASSESSMENT — PATIENT HEALTH QUESTIONNAIRE - PHQ9
SUM OF ALL RESPONSES TO PHQ QUESTIONS 1-9: 15
5. POOR APPETITE OR OVEREATING: NOT AT ALL

## 2020-03-31 NOTE — PROGRESS NOTES
"  SUBJECTIVE:   Rayray Contreras is a 24 year old female who presents to clinic today for the following health issues:    Back Pain       Duration: 3/26/2020 - getting worse.          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     Description:   Location of pain: middle of back bilateral  Character of pain: sharp  Pain radiation:radiates into both shoulders   New numbness or weakness in legs, not attributed to pain:  no     Intensity: At its worst 9.5/10    History:   Pain interferes with job: YES  History of back problems: no prior back problems  Any previous MRI or X-rays: None  Sees a specialist for back pain:  No  Therapies tried without relief: none    Alleviating factors:   Improved by: lying flat (is taking Tylenol cold & sinus, so didn't want to take any additional APAP due to this).  Was told not to take ibuprofen for possible bleeding disorder (see below).    Precipitating factors:  Worsened by: movement, walking    Bleeding disorder?  Per chart review patient has had bleeding issue with nosebleeds in the past.  Mention of Von Willebrand's, however, appears that testing was negative x 3.  Patient has a developmental delay and was not aware of such a diagnosis but states she was told \"not to take ibuprofen\" due to nosebleeds.        Problem list and histories reviewed & adjusted, as indicated.  Additional history: as documented    Patient Active Problem List   Diagnosis     Developmental delay     Class 1 obesity due to excess calories without serious comorbidity with body mass index (BMI) of 32.0 to 32.9 in adult     PCOS (polycystic ovarian syndrome)     Major depression in complete remission (H)     Health Care Home     Von Willebrand's disease (H) - UNCLEAR - 2008 couple of heavy nose bleeds.  Tested for [employer]- treated with ambicar.  Third set of testing came back negative for Von Willebrand's       Past Surgical History:   Procedure Laterality " Date     NO HISTORY OF SURGERY         Social History     Tobacco Use     Smoking status: Never Smoker     Smokeless tobacco: Never Used   Substance Use Topics     Alcohol use: No     Alcohol/week: 0.0 standard drinks     Comment: once every few months      Family History   Problem Relation Age of Onset     Arthritis Mother      Diabetes Maternal Grandmother      Colon Cancer Maternal Grandmother         passed from - around 80 years old     Arthritis Maternal Grandmother      Cerebrovascular Disease Maternal Grandmother      Coronary Artery Disease Maternal Grandmother      Diabetes Maternal Grandfather      Cerebrovascular Disease Maternal Grandfather      Coronary Artery Disease Maternal Grandfather      Cerebrovascular Disease Paternal Grandmother      Diabetes Paternal Grandmother      Coronary Artery Disease Paternal Grandmother      Cerebrovascular Disease Paternal Grandfather      Diabetes Paternal Grandfather      Coronary Artery Disease Paternal Grandfather      Breast Cancer No family hx of      Thyroid Disease No family hx of          Current Outpatient Medications   Medication Sig Dispense Refill     acetaminophen (TYLENOL) 650 MG CR tablet Take 1 tablet (650 mg) by mouth 3 times daily as needed for pain       cyclobenzaprine (FLEXERIL) 10 MG tablet 1/2 tab during the day and 1 tab at bedtime as needed for muscle spasms 20 tablet 0     levonorgestrel-ethinyl estradiol (INTROVALE) 0.15-0.03 MG tablet Take 1 tablet by mouth daily 91 tablet 3     predniSONE (DELTASONE) 20 MG tablet Take 3 tabs by mouth daily x 3 days, then 2 tabs daily x 3 days, then 1 tab daily x 3 days, then 1/2 tab daily x 3 days. 20 tablet 0     Allergies   Allergen Reactions     Ibuprofen Other (See Comments)     Bleeding?  Per chart negative for Von Willebrand's x 3     Chicken-Derived Products (Egg)      Other reaction(s): GI Upset       Reviewed and updated as needed this visit by clinical staff  Tobacco  Allergies  Meds   "Problems  Med Hx  Surg Hx  Fam Hx  Soc Hx        Reviewed and updated as needed this visit by Provider  Tobacco  Allergies  Meds  Problems  Med Hx  Surg Hx  Fam Hx         ROS:  Constitutional, HEENT, cardiovascular, pulmonary, GI, , musculoskeletal, neuro, skin, endocrine and psych systems are negative, except as otherwise noted.    OBJECTIVE:   /86 (BP Location: Right arm, Cuff Size: Adult Regular)   Pulse 89   Temp 98  F (36.7  C) (Oral)   Ht 1.575 m (5' 2\")   Wt 81.2 kg (179 lb)   SpO2 97%   BMI 32.74 kg/m   Body mass index is 32.74 kg/m .    GENERAL: healthy, alert and no distress  EYES: Eyes grossly normal to inspection, PERRL and conjunctivae and sclerae normal  HENT: ear canals and TM's normal and nose and mouth without ulcers or lesions  NECK: no adenopathy  RESP: lungs clear to auscultation - no rales, rhonchi or wheezes  CV: regular rate and rhythm, normal S1 S2, no S3 or S4, no murmur, click or rub, no peripheral edema and peripheral pulses strong  MS: no visible physical abnormality.  No step offs of vertebral bodies palpable.  No tenderness along spinous processes. Tenderness to perithoracic musculature, most notably on the right, tension and tenderness of right upper trapezius musculature.    SKIN: no suspicious lesions or rashes  NEURO: Normal strength and tone, mentation intact and speech normal  PSYCH: mentation appears normal, affect normal/bright    Diagnostic Test Results:  none   ASSESSMENT/PLAN:   Rayray was seen today for back pain.    Diagnoses and all orders for this visit:    Acute midline thoracic back pain  Encounter for completion of form with patient  Likely MSK exacerbation.  Will treat with steroids, muscle relaxants and prednisone due to underlying concern for bleeding diathesis.  Work restrictions put in place for 1 month.  If symptoms persist beyond this need to RTC for further evaluation.  -     acetaminophen (TYLENOL) 650 MG CR tablet; Take 1 tablet " (650 mg) by mouth 3 times daily as needed for pain  -     predniSONE (DELTASONE) 20 MG tablet; Take 3 tabs by mouth daily x 3 days, then 2 tabs daily x 3 days, then 1 tab daily x 3 days, then 1/2 tab daily x 3 days.  -     cyclobenzaprine (FLEXERIL) 10 MG tablet; 1/2 tab during the day and 1 tab at bedtime as needed for muscle spasms  -     [facility]; Future    Von Willebrand's disease (H) - UNCLEAR - March of 2008 couple of heavy nose bleeds.  Tested for Von [employer]- treated with ambicar.  Third set of testing came back negative for Von Willebrand's    Need further evaluation.  Will do at physical.  In the meantime, avoid NSAID and aspirin.      Return in about 1 week (around 4/7/2020) for physical therapy.     [address] [address]  [email or web address]   Office: [phone]           Jennifer Aguilar, MS, PA-C

## 2020-03-31 NOTE — TELEPHONE ENCOUNTER
Call to patient, left message for patient to return call.    Provider wants to see patient in office NOT telephone visit for patients symptoms. When patient calls back please change OV.     Renea Rodrigez MA

## 2020-04-01 PROBLEM — D68.00 VON WILLEBRAND'S DISEASE (H): Status: ACTIVE | Noted: 2020-04-01

## 2020-04-01 ASSESSMENT — ANXIETY QUESTIONNAIRES: GAD7 TOTAL SCORE: 9

## 2020-04-27 ENCOUNTER — VIRTUAL VISIT (OUTPATIENT)
Dept: PHYSICAL THERAPY | Facility: CLINIC | Age: 25
End: 2020-04-27
Attending: PHYSICIAN ASSISTANT
Payer: OTHER MISCELLANEOUS

## 2020-04-27 DIAGNOSIS — M54.6 ACUTE MIDLINE THORACIC BACK PAIN: ICD-10-CM

## 2020-04-27 DIAGNOSIS — M54.2 CERVICAL PAIN: ICD-10-CM

## 2020-04-27 PROCEDURE — 97161 PT EVAL LOW COMPLEX 20 MIN: CPT | Mod: 95 | Performed by: PHYSICAL THERAPIST

## 2020-04-27 PROCEDURE — 97110 THERAPEUTIC EXERCISES: CPT | Mod: 95 | Performed by: PHYSICAL THERAPIST

## 2020-04-27 NOTE — PROGRESS NOTES
"Physical Therapy Virtual Initial Visit      The patient has been notified of following:     \"This virtual visit will be conducted between you and your provider. We have found that certain health care needs can be provided without the need for physical presence.  This service lets us provide the care you need with a virtual visit.\"    Due to external, as well as internal [facility] management of the COVID-19 Virus, Rayray Contreras was not seen in our clinic.  As a substitution, we implemented a virtual visit to manage this patient's condition utilizing the PTRx virtual visit platform via the patient s existing code.  The provider, Kriss Vidal, reviewed the patient's chart, PTRx prescription, and spoke with the patient to determine the following telemedicine visit is appropriate and effective for the patient's care.    The following type of visit was completed:   Telephone Visit:  A telephone visit was used in conjunction with the online PTRx exercise platform.         Subjective:  The history is provided by the patient. No  was used.   Patient Health History  Rayray Contreras being seen for B scapular pain.     Problem began: 3/31/2020.   Problem occurred: pushing a  into an elevator at work   Pain is reported as 8/10 on pain scale.  General health as reported by patient is good.  Pertinent medical history includes: depression.   Red flags:  None as reported by patient.  Medical allergies: ibuprofen.   Surgeries include:  None.    Current medications:  Muscle relaxants and steroids.    Current occupation is , .   Primary job tasks include:  Lifting/carrying, pushing/pulling, prolonged standing and repetitive tasks.                  Therapist Generated HPI Evaluation  Problem details: Patient reports onset of B scapular pain on 03/31/2020 after pushing a heavy  into an elevator at work a couple of days prior.  She did not notice pain at " the time in B scapula but had LBP initially.         Type of problem:  Thoracic spine (B scapula).    This is a new condition.  Condition occurred with:  Other reason (pushing).  Where condition occurred: at work.  Patient reports pain:  Other (B scapula).  Pain is described as aching and is constant.  Pain is the same all the time.  Since onset symptoms are unchanged.  Symptoms are exacerbated by lifting and other (driving, sitting for 15 minutes, moving neck)  and relieved by nothing.  Imaging testing: none.  Past treatment: none.   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  Barriers include:  None as reported by patient.                  Objective:    System            Cervical/Thoracic Evaluation    AROM:  AROM Cervical:    Flexion:            Mild restriction--increases B scapular pain  Extension:       WNL, NE  Rotation:         Left: min restriction--increase B scap pain     Right: min restriction--increase B scapular pain  Side Bend:      Left: WNL, increase B scapular pressure     Right:  WNL, NE    Strength: protrusion--increases B scapular pain; retraction--increases B scapular pain                                                            General   ROS    Repeated movements:  Retraction--increases during, NW after, NE ROM  Retraction with self-OP--increases during, NW after, NE ROM  Retraction/extension--decreases during, B after, less pain B rotation    PTRx Content from today's visit:  Exercise Name: Seated Cervical Retraction Extension With Overpressure - Reps: 10 reps - Sessions: every 2 hours, Notes: do not do rotation at top yet  Exercise Name: Posture Correction with Lumbar Roll    Assessment/Plan:     Patient is a 24 year old female with B scapular complaints.  Provisional classification of cervical derangement with directional preference for extension.  She had decreased pain after repeated cervical retraction/extension  exercises today and will try at home to assess further.  Treatment will focus on cervical and thoracic extension exercises in addition to posture training to decrease pain and improve function.       Patient has the following significant findings with corresponding treatment plan.                Diagnosis 1:  B scapular pain--cervical  Pain -  self management, education, directional preference exercise and home program  Decreased ROM/flexibility - therapeutic exercise and home program  Decreased function - therapeutic activities and home program      Cumulative Therapy Evaluation is: Low complexity.    Previous and current functional limitations:  (See Goal Flow Sheet for this information)    Short term and Long term goals: (See Goal Flow Sheet for this information)     Communication ability:  Patient appears to be able to clearly communicate and understand verbal and written communication and follow directions correctly.  Treatment Explanation - The following has been discussed with the patient:   RX ordered/plan of care  Anticipated outcomes  Possible risks and side effects  This patient would benefit from PT intervention to resume normal activities.   Rehab potential is good.    Frequency:  2 X week, once daily  Duration:  for 1 weeks tapering to 1 X a week over 4 weeks  Discharge Plan:  Achieve all LTG.  Independent in home treatment program.  Reach maximal therapeutic benefit.    Please refer to the daily flowsheet for treatment today, total treatment time and time spent performing 1:1 timed codes.       Virtual visit contact time    Time of service began: 2:00 PM  Time of service ended: 2:35 PM  Total Time for set up, visit, and documentation: 50 minutes    Payor: [employer] / Plan: [employer] / Product Type: *No Product type* /     Procedure Code/s   Therapeutic Exercise (99854): 10 minutes  Neuromuscular Re-education (54106): 2 minutes    I have reviewed the note as documented above.  This  accurately captures the substance of my conversation with the patient.  Provider location: [facility] ([facility])  Patient location: home    ___________________________________________________

## 2020-04-28 NOTE — PROGRESS NOTES
"Subjective     Rayray Contreras is a 24 year old female who is being evaluated via a billable telephone visit.      The patient has been notified of following:     \"This telephone visit will be conducted via a call between you and your physician/provider. We have found that certain health care needs can be provided without the need for a physical exam.  This service lets us provide the care you need with a short phone conversation.  If a prescription is necessary we can send it directly to your pharmacy.  If lab work is needed we can place an order for that and you can then stop by our lab to have the test done at a later time.    Telephone visits are billed at different rates depending on your insurance coverage. During this emergency period, for some insurers they may be billed the same as an in-person visit.  Please reach out to your insurance provider with any questions.    If during the course of the call the physician/provider feels a telephone visit is not appropriate, you will not be charged for this service.\"     Physician has received verbal consent for a Telephone Visit from the patient? 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     Description:   Location of pain: middle of back bilateral  Character of pain: sharp  Pain radiation:radiates into both shoulders, this is where the pain is the worst   New numbness or weakness in legs, not attributed to pain:  no     Intensity: At its worst 9/10    History:   Pain interferes with job: YES but has been working through her job.   History of back problems: no prior back problems  Any previous MRI or X-rays: None  Sees a specialist for back pain:  No  Therapies tried " without relief: none    Alleviating factors:   Improved by: lying flat    Precipitating factors:  Worsened by: movements that make the patient raise her arms, also bending down.     Has been taking aleve (taking 1 tablet morning) but it has not been very effective.  Tylenol did not helpDid finish prednisone but didn't notice any relief, but has noticed relief from the cyclobenzaprine. 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  She frequently bends/twists/lifts..  Has only had 1 virtual physical therapy visit that was earlier this week.  Reports that she is compliant with her home exercise program.      Patient Active Problem List   Diagnosis     Developmental delay     Class 1 obesity due to excess calories without serious comorbidity with body mass index (BMI) of 32.0 to 32.9 in adult     PCOS (polycystic ovarian syndrome)     Major depression in complete remission (H)     Health Care Home     Von Willebrand's disease (H) - UNCLEAR - March of 2008 couple of heavy nose bleeds.  Tested for Von [employer]- treated with ambicar.  Third set of testing came back negative for Von Willebrand's       Cervical pain     Past Surgical History:   Procedure Laterality Date     NO HISTORY OF SURGERY         Social History     Tobacco Use     Smoking status: Never Smoker     Smokeless tobacco: Never Used   Substance Use Topics     Alcohol use: No     Alcohol/week: 0.0 standard drinks     Comment: once every few months      Family History   Problem Relation Age of Onset     Arthritis Mother      Diabetes Maternal Grandmother      Colon Cancer Maternal Grandmother         passed from - around 80 years old     Arthritis Maternal Grandmother      Cerebrovascular Disease Maternal Grandmother      Coronary Artery Disease Maternal Grandmother      Diabetes Maternal Grandfather      Cerebrovascular Disease Maternal Grandfather      Coronary Artery Disease Maternal Grandfather       Cerebrovascular Disease Paternal Grandmother      Diabetes Paternal Grandmother      Coronary Artery Disease Paternal Grandmother      Cerebrovascular Disease Paternal Grandfather      Diabetes Paternal Grandfather      Coronary Artery Disease Paternal Grandfather      Breast Cancer No family hx of      Thyroid Disease No family hx of          Current Outpatient Medications   Medication Sig Dispense Refill     cyclobenzaprine (FLEXERIL) 10 MG tablet 1/2 tab during the day and 1 tab at bedtime as needed for muscle spasms 20 tablet 0     levonorgestrel-ethinyl estradiol (INTROVALE) 0.15-0.03 MG tablet Take 1 tablet by mouth daily 91 tablet 3     naproxen sodium (ANAPROX) 550 MG tablet Take 1 tablet (550 mg) by mouth 2 times daily (with meals) 60 tablet 1     Allergies   Allergen Reactions     Ibuprofen Other (See Comments)     Bleeding?  Per chart negative for Von Willebrand's x 3     Chicken-Derived Products (Egg)      Other reaction(s): GI Upset       Reviewed and updated as needed this visit by Provider  Tobacco  Allergies  Meds  Problems  Med Hx  Surg Hx  Fam Hx         Review of Systems   ROS COMP: Constitutional, HEENT, cardiovascular, pulmonary, GI, , musculoskeletal, neuro, skin, endocrine and psych systems are negative, except as otherwise noted.       Objective   Reported vitals:  There were no vitals taken for this visit.   healthy, alert and no distress  Psych: Alert and oriented times 3; coherent speech, normal   rate and volume, able to articulate logical thoughts, able   to abstract reason, no tangential thoughts, no hallucinations   or delusions  Her affect is flat     Diagnostic Test Results:  Labs reviewed in Epic        Assessment/Plan:   Rayray was seen today for recheck.    Diagnoses and all orders for this visit:    Acute midline thoracic back pain  Suboptimally controlled low back pain with minimal physical therapy thus far.  Advised patient to continue physical therapy and be  compliant with home exercise program.  Will have her do more regular use of naproxen at a higher dosage as well as PRN cyclobenzaprine.  Heat and ice recommended in 20-minute intervals.  Also limit aggravating factors at work with lifting, bending, twisting.  If symptoms not significantly improved in 4 to 6 weeks she is to contact the clinic and we will discuss if imaging is appropriate at that time.  Patient voiced understanding and agreement.  -     naproxen sodium (ANAPROX) 550 MG tablet; Take 1 tablet (550 mg) by mouth 2 times daily (with meals)  -     cyclobenzaprine (FLEXERIL) 10 MG tablet; 1/2 tab during the day and 1 tab at bedtime as needed for muscle spasms         Return in about 4 weeks (around 5/28/2020) for Contact clinic for phone/video visit if symptoms not improving or worsening..      Phone call duration:  11:24 minutes        Jennifer Aguilar PA-C

## 2020-04-30 ENCOUNTER — VIRTUAL VISIT (OUTPATIENT)
Dept: FAMILY MEDICINE | Facility: CLINIC | Age: 25
End: 2020-04-30
Payer: COMMERCIAL

## 2020-04-30 ENCOUNTER — TELEPHONE (OUTPATIENT)
Dept: FAMILY MEDICINE | Facility: CLINIC | Age: 25
End: 2020-04-30

## 2020-04-30 DIAGNOSIS — M54.6 ACUTE MIDLINE THORACIC BACK PAIN: Primary | ICD-10-CM

## 2020-04-30 PROCEDURE — 99213 OFFICE O/P EST LOW 20 MIN: CPT | Mod: TEL | Performed by: PHYSICIAN ASSISTANT

## 2020-04-30 RX ORDER — NAPROXEN SODIUM 550 MG/1
550 TABLET ORAL 2 TIMES DAILY WITH MEALS
Qty: 60 TABLET | Refills: 1 | Status: SHIPPED | OUTPATIENT
Start: 2020-04-30 | End: 2020-09-25

## 2020-04-30 RX ORDER — CYCLOBENZAPRINE HCL 10 MG
TABLET ORAL
Qty: 20 TABLET | Refills: 0 | Status: SHIPPED | OUTPATIENT
Start: 2020-04-30 | End: 2020-06-02

## 2020-04-30 NOTE — TELEPHONE ENCOUNTER
Patient calling back after visit. Would like workability paperwork faxed to [phone]. Anita Jarquin, Clinic Receptionist

## 2020-05-04 NOTE — TELEPHONE ENCOUNTER
Attempt #1  Called patient @ [phone] - Left a non-detailed message to call back and speak with any triage nurse.    Zenaida Foster RN  [facility]

## 2020-05-04 NOTE — TELEPHONE ENCOUNTER
We did a virtual visit.  I did not have any paperwork to complete.  We can complete if she gives it to us      Jennifer Aguilar MBA, MS, PA-C

## 2020-05-05 NOTE — TELEPHONE ENCOUNTER
Called #   Telephone Information:   Mobile [phone]      pt is just asking for AVS to be faxed to the number below for work comp     Faxed to  number below         Delores Garcia RN, BSN  [facility]

## 2020-05-05 NOTE — TELEPHONE ENCOUNTER
Anita Valladares called back - pt needs the workability form not the AVS sheet   She needs to know how much pt should lift? Etc   Form is on LP,PA-cs desk         Please advise     Thank you

## 2020-05-14 ENCOUNTER — THERAPY VISIT (OUTPATIENT)
Dept: PHYSICAL THERAPY | Facility: CLINIC | Age: 25
End: 2020-05-14
Payer: OTHER MISCELLANEOUS

## 2020-05-14 DIAGNOSIS — M54.2 CERVICAL PAIN: ICD-10-CM

## 2020-05-14 PROCEDURE — 97110 THERAPEUTIC EXERCISES: CPT | Mod: GP | Performed by: PHYSICAL THERAPIST

## 2020-05-14 PROCEDURE — 97112 NEUROMUSCULAR REEDUCATION: CPT | Mod: GP | Performed by: PHYSICAL THERAPIST

## 2020-05-14 PROCEDURE — 97530 THERAPEUTIC ACTIVITIES: CPT | Mod: GP | Performed by: PHYSICAL THERAPIST

## 2020-05-21 ENCOUNTER — THERAPY VISIT (OUTPATIENT)
Dept: PHYSICAL THERAPY | Facility: CLINIC | Age: 25
End: 2020-05-21
Payer: OTHER MISCELLANEOUS

## 2020-05-21 DIAGNOSIS — M54.2 CERVICAL PAIN: ICD-10-CM

## 2020-05-21 PROCEDURE — 97112 NEUROMUSCULAR REEDUCATION: CPT | Mod: GP | Performed by: PHYSICAL THERAPIST

## 2020-05-21 PROCEDURE — 97110 THERAPEUTIC EXERCISES: CPT | Mod: GP | Performed by: PHYSICAL THERAPIST

## 2020-06-02 ENCOUNTER — OFFICE VISIT (OUTPATIENT)
Dept: FAMILY MEDICINE | Facility: CLINIC | Age: 25
End: 2020-06-02
Payer: OTHER MISCELLANEOUS

## 2020-06-02 VITALS
SYSTOLIC BLOOD PRESSURE: 112 MMHG | HEART RATE: 100 BPM | OXYGEN SATURATION: 96 % | WEIGHT: 186 LBS | TEMPERATURE: 98.6 F | BODY MASS INDEX: 34.02 KG/M2 | DIASTOLIC BLOOD PRESSURE: 60 MMHG

## 2020-06-02 DIAGNOSIS — M54.6 ACUTE MIDLINE THORACIC BACK PAIN: Primary | ICD-10-CM

## 2020-06-02 PROCEDURE — 99213 OFFICE O/P EST LOW 20 MIN: CPT | Performed by: FAMILY MEDICINE

## 2020-06-02 RX ORDER — CYCLOBENZAPRINE HCL 10 MG
TABLET ORAL
Qty: 20 TABLET | Refills: 0 | Status: SHIPPED | OUTPATIENT
Start: 2020-06-02 | End: 2020-09-25

## 2020-06-02 NOTE — PROGRESS NOTES
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 She was referred to physical therapy and also had follow up a month later with ongoing pain. Today she states the pain is about the same - she feels a pinching sensation between her shoulder blades. She is taking muscle relaxant at night because it makes her drowsy. Also using Aleve with some relief.    Patient Active Problem List   Diagnosis     Developmental delay     Class 1 obesity due to excess calories without serious comorbidity with body mass index (BMI) of 32.0 to 32.9 in adult     PCOS (polycystic ovarian syndrome)     Major depression in complete remission (H)     Health Care Home     Von Willebrand's disease (H) - UNCLEAR - March of 2008 couple of heavy nose bleeds.  Tested for Von Willebrands- treated with ambicar.  Third set of testing came back negative for Von Willebrand's       Cervical pain     Past Surgical History:   Procedure Laterality Date     NO HISTORY OF SURGERY         Social History     Tobacco Use     Smoking status: Never Smoker     Smokeless tobacco: Never Used   Substance Use Topics     Alcohol use: No     Alcohol/week: 0.0 standard drinks     Comment: once every few months      Family History   Problem Relation Age of Onset     Arthritis Mother      Diabetes Maternal Grandmother      Colon Cancer Maternal Grandmother         passed from - around 80 years old     Arthritis Maternal Grandmother      Cerebrovascular Disease Maternal Grandmother      Coronary Artery Disease Maternal Grandmother      Diabetes Maternal Grandfather      Cerebrovascular Disease Maternal Grandfather      Coronary Artery Disease Maternal Grandfather      Cerebrovascular Disease Paternal Grandmother      Diabetes  Paternal Grandmother      Coronary Artery Disease Paternal Grandmother      Cerebrovascular Disease Paternal Grandfather      Diabetes Paternal Grandfather      Coronary Artery Disease Paternal Grandfather      Breast Cancer No family hx of      Thyroid Disease No family hx of              Reviewed and updated as needed this visit by Provider  Tobacco  Allergies  Meds  Problems  Med Hx  Surg Hx  Fam Hx         Review of Systems   Constitutional, HEENT, cardiovascular, pulmonary, gi and gu systems are negative, except as otherwise noted.      Objective    /60   Pulse 100   Temp 98.6  F (37  C) (Oral)   Wt 84.4 kg (186 lb)   SpO2 96%   BMI 34.02 kg/m    Body mass index is 34.02 kg/m .  Physical Exam   GENERAL: healthy, alert and no distress  MS: neck exam shows normal strength, no torticollis and ROM is normal and spine exam shows ROM is normal. Bilateral upper extremities with equal strength and normal sensation.    Diagnostic Test Results:  none         Assessment & Plan     1. Acute midline thoracic back pain: pain seems muscular in nature. No radicular symptoms but could consider more advanced imaging in the future if still not improving or if worsening. For now, continue symptomatic management. Continue physical therapy, icing, and antiinflammatories. Okay to continue work restrictions for the next several weeks.  - Orthopedic & Spine  Referral; Future  - cyclobenzaprine (FLEXERIL) 10 MG tablet; 1/2 tab during the day and 1 tab at bedtime as needed for muscle spasms  Dispense: 20 tablet; Refill: 0       Return in about 1 month (around 7/2/2020) for follow up if symptoms not improving.    Jonah Salgado, DO  [facility] [place]

## 2020-06-03 ENCOUNTER — TELEPHONE (OUTPATIENT)
Dept: FAMILY MEDICINE | Facility: CLINIC | Age: 25
End: 2020-06-03

## 2020-06-03 NOTE — TELEPHONE ENCOUNTER
Pt had virtual visit with SW, DO and needed workability form updated and faxed to [facility] at fax number [phone].  Form faxed and sent to [employer].  Jillian Trammell

## 2020-06-23 ENCOUNTER — TELEPHONE (OUTPATIENT)
Dept: FAMILY MEDICINE | Facility: CLINIC | Age: 25
End: 2020-06-23

## 2020-06-23 DIAGNOSIS — M54.6 BILATERAL THORACIC BACK PAIN, UNSPECIFIED CHRONICITY: Primary | ICD-10-CM

## 2020-06-23 NOTE — TELEPHONE ENCOUNTER
Called pt and left message letting her know letter was written.  Need to know how to get this to her.  She can print from [employer] or I can mail or fax this.  Jillian Trammell

## 2020-06-23 NOTE — LETTER
[facility]  [address] [address]  [phone]          June 23, 2020    RE:  Rayray Contreras                                                                                                                                                       [address] [place] [address]            To whom it may concern:    Rayray Contreras is under my professional care for Bilateral thoracic back pain, unspecified chronicity She  may return to work with the following: The employee is ABLE to return to work today without restrictions. Please contact the clinic if you have further questions or concerns.         Sincerely,        Doug Puga MD  [facility]

## 2020-06-23 NOTE — TELEPHONE ENCOUNTER
Reason for Call:  Other call back    Detailed comments: Pt has been seen for back-Work comp. Pt would like work restrictions released.  Does pt need a face to face visit or virtual visit.    Phone Number Patient can be reached at: Home number on file [phone] (home)    Best Time: anytime    Can we leave a detailed message on this number? YES    Call taken on 6/23/2020 at 8:18 AM by Ayana York

## 2020-06-23 NOTE — TELEPHONE ENCOUNTER
Please see patient request below along with 6/2/20 office visit notes. Are you able to review and advise in SW, DO's absence?  KEITH MejiaN, RN  [facility]

## 2020-08-17 ENCOUNTER — VIRTUAL VISIT (OUTPATIENT)
Dept: FAMILY MEDICINE | Facility: OTHER | Age: 25
End: 2020-08-17
Payer: COMMERCIAL

## 2020-08-17 DIAGNOSIS — Z20.822 SUSPECTED 2019 NOVEL CORONAVIRUS INFECTION: Primary | ICD-10-CM

## 2020-08-17 PROCEDURE — 99421 OL DIG E/M SVC 5-10 MIN: CPT | Performed by: PHYSICIAN ASSISTANT

## 2020-08-17 NOTE — PROGRESS NOTES
"Date: 2020 12:03:55  Clinician: Jina Caldwell  Clinician NPI: [id number]  Patient: Rayray Contreras  Patient : 1995  Patient Address[address] [address] [place], [place] [address] [address]  Patient Phone: [phone]  Visit Protocol: URI  Patient Summary:  Rayray is a 25 year old ( : 1995 ) female who initiated a Visit for COVID-19 (Coronavirus) evaluation and screening. When asked the question \"Please sign me up to receive news, health information and promotions from [employer].\", Rayray responded \"No\".    Rayray states her symptoms started 1-2 days ago.   Her symptoms consist of a headache, a sore throat, a cough, nasal congestion, rhinitis, and facial pain or pressure.   Symptom details     Nasal secretions: The color of her mucus is clear.    Cough: Rayray coughs a few times an hour and her cough is not more bothersome at night. Phlegm does not come into her throat when she coughs. She believes her cough is caused by post-nasal drip.     Sore throat: Rayray reports having mild throat pain (1-3 on a 10 point pain scale), does not have exudate on her tonsils, and can swallow liquids. She is not sure if the lymph nodes in her neck are enlarged. A rash has not appeared on the skin since the sore throat started.     Facial pain or pressure: The facial pain or pressure feels worse when bending over or leaning forward.     Headache: She states the headache is mild (1-3 on a 10 point pain scale).      Rayray denies having ear pain, chills, nausea, teeth pain, ageusia, diarrhea, vomiting, myalgias, anosmia, fever, wheezing, and malaise. She also denies taking antibiotic medication in the past month and having recent facial or sinus surgery in the past 60 days. She is not experiencing dyspnea.   Precipitating events  Rayray is not sure if she has been exposed to someone with strep throat. She has not recently been exposed to someone with influenza. Rayray has not been in close contact with any high " risk individuals.   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   She does not work or volunteer as healthcare worker or a  and does not work or volunteer in a healthcare facility.   Rayray has not lived in a congregate living setting in the past 14 days. She lives with a healthcare worker.   Rayray has not had a close contact with a laboratory-confirmed COVID-19 patient within 14 days of symptom onset.   Since December 2019, Rayray and has not had upper respiratory infection or influenza-like illness. Has not been diagnosed with lab-confirmed COVID-19 test   Pertinent medical history  Rayray does not get yeast infections when she takes antibiotics.   Rayray needs a return to work/school note.   Weight: 184 lbs   Rayray does not smoke or use smokeless tobacco.   She denies pregnancy and denies breastfeeding. Her last period was over a month ago.   Weight: 184 lbs    MEDICATIONS: No current medications, ALLERGIES: NKDA  Clinician Response:  Dear Rayray,   Your symptoms show that you may have coronavirus (COVID-19). This illness can cause fever, cough and trouble breathing. Many people get a mild case and get better on their own. Some people can get very sick.  What should I do?  We would like to test you for this virus.   1. Please call [phone] to schedule your visit. Explain that you were referred by [facility] to have a COVID-19 test. Be ready to share your [facility] visit [facility].  The following will serve as your written order for this COVID Test, ordered by me, for the indication of suspected COVID [Z20.828]: The test will be ordered in [employer], our electronic health record, after you are scheduled. It will show as ordered and authorized by Don Salomon MD.  Order: COVID-19 (Coronavirus) PCR for SYMPTOMATIC testing from [facility].      2. When it's time for your COVID test:  Stay at least 6 feet away from others. (If  "someone will drive you to your test, stay in the backseat, as far away from the  as you can.)   Cover your mouth and nose with a mask, tissue or washcloth.  Go straight to the testing site. Don't make any stops on the way there or back.      3.Starting now: Stay home and away from others (self-isolate) until:   You've had no fever---and no medicine that reduces fever---for one full day (24 hours). And...   Your other symptoms have gotten better. For example, your cough or breathing has improved. And...   At least 10 days have passed since your symptoms started.       During this time, don't leave the house except for testing or medical care.   Stay in your own room, even for meals. Use your own bathroom if you can.   Stay away from others in your home. No hugging, kissing or shaking hands. No visitors.  Don't go to work, school or anywhere else.    Clean \"high touch\" surfaces often (doorknobs, counters, handles, etc.). Use a household cleaning spray or wipes. You'll find a full list of  on the EPA website: www.epa.gov/pesticide-registration/list-n-disinfectants-use-against-sars-cov-2.   Cover your mouth and nose with a mask, tissue or washcloth to avoid spreading germs.  Wash your hands and face often. Use soap and water.  Caregivers in these groups are at risk for severe illness due to COVID-19:  o People 65 years and older  o People who live in a nursing home or long-term care facility  o People with chronic disease (lung, heart, cancer, diabetes, kidney, liver, immunologic)  o People who have a weakened immune system, including those who:   Are in cancer treatment  Take medicine that weakens the immune system, such as corticosteroids  Had a bone marrow or organ transplant  Have an immune deficiency  Have poorly controlled HIV or AIDS  Are obese (body mass index of 40 or higher)  Smoke regularly   o Caregivers should wear gloves while washing dishes, handling laundry and cleaning bedrooms and " bathrooms.  o Use caution when washing and drying laundry: Don't shake dirty laundry, and use the warmest water setting that you can.  o For more tips, go to www.cdc.gov/coronavirus/2019-ncov/downloads/10Things.pdf.    4.Sign up for [employer]. We know it's scary to hear that you might have COVID-19. We want to track your symptoms to make sure you're okay over the next 2 weeks. Please look for an email from [employer]---this is a free, online program that we'll use to keep in touch. To sign up, follow the link in the email. Learn more at [email or web address]  How can I take care of myself?   Get lots of rest. Drink extra fluids (unless a doctor has told you not to).   Take Tylenol (acetaminophen) for fever or pain. If you have liver or kidney problems, ask your family doctor if it's okay to take Tylenol.   Adults can take either:    650 mg (two 325 mg pills) every 4 to 6 hours, or...   1,000 mg (two 500 mg pills) every 8 hours as needed.    Note: Don't take more than 3,000 mg in one day. Acetaminophen is found in many medicines (both prescribed and over-the-counter medicines). Read all labels to be sure you don't take too much.   For children, check the Tylenol bottle for the right dose. The dose is based on the child's age or weight.    If you have other health problems (like cancer, heart failure, an organ transplant or severe kidney disease): Call your specialty clinic if you don't feel better in the next 2 days.       Know when to call 911. Emergency warning signs include:    Trouble breathing or shortness of breath Pain or pressure in the chest that doesn't go away Feeling confused like you haven't felt before, or not being able to wake up Bluish-colored lips or face.  Where can I get more information?    [employer] -- About COVID-19: [email or web address]   CDC -- What to Do If You're Sick: www.cdc.gov/coronavirus/2019-ncov/about/steps-when-sick.html   CDC -- Ending Home  Isolation: www.cdc.gov/coronavirus/2019-ncov/hcp/disposition-in-home-patients.html   CDC -- Caring for Someone: www.cdc.gov/coronavirus/2019-ncov/if-you-are-sick/care-for-someone.html   [facility] -- Interim Guidance for Hospital Discharge to Home: www.[facility].[place]./diseases/coronavirus/hcp/hospdischarge.pdf   [facility] clinical trials (COVID-19 research studies): clinicalaffairs.[facility]-clinical-trials    Below are the COVID-19 hotlines at the [place] Department of [facility] ([facility]). Interpreters are available.    For health questions: Call 302-870-0961 or 1-367.276.7483 (7 a.m. to 7 p.m.) For questions about schools and childcare: Call 173-834-1583 or 1-265.451.8780 (7 a.m. to 7 p.m.)    Diagnosis: Cough  Diagnosis ICD: R05

## 2020-08-18 DIAGNOSIS — Z20.822 SUSPECTED 2019 NOVEL CORONAVIRUS INFECTION: ICD-10-CM

## 2020-08-18 PROCEDURE — U0003 INFECTIOUS AGENT DETECTION BY NUCLEIC ACID (DNA OR RNA); SEVERE ACUTE RESPIRATORY SYNDROME CORONAVIRUS 2 (SARS-COV-2) (CORONAVIRUS DISEASE [COVID-19]), AMPLIFIED PROBE TECHNIQUE, MAKING USE OF HIGH THROUGHPUT TECHNOLOGIES AS DESCRIBED BY CMS-2020-01-R: HCPCS | Performed by: FAMILY MEDICINE

## 2020-08-19 ENCOUNTER — TELEPHONE (OUTPATIENT)
Dept: FAMILY MEDICINE | Facility: CLINIC | Age: 25
End: 2020-08-19

## 2020-08-19 ENCOUNTER — E-VISIT (OUTPATIENT)
Dept: FAMILY MEDICINE | Facility: CLINIC | Age: 25
End: 2020-08-19
Payer: COMMERCIAL

## 2020-08-19 DIAGNOSIS — Z20.818 EXPOSURE TO STREP THROAT: ICD-10-CM

## 2020-08-19 DIAGNOSIS — J02.9 ACUTE PHARYNGITIS, UNSPECIFIED ETIOLOGY: Primary | ICD-10-CM

## 2020-08-19 LAB
SARS-COV-2 RNA SPEC QL NAA+PROBE: NOT DETECTED
SPECIMEN SOURCE: NORMAL

## 2020-08-19 PROCEDURE — 99207 ZZC NON-BILLABLE SERV PER CHARTING: CPT | Performed by: PHYSICIAN ASSISTANT

## 2020-08-19 RX ORDER — AMOXICILLIN 500 MG/1
500 CAPSULE ORAL EVERY 12 HOURS
Qty: 20 CAPSULE | Refills: 0 | Status: SHIPPED | OUTPATIENT
Start: 2020-08-19 | End: 2020-08-29

## 2020-08-19 NOTE — TELEPHONE ENCOUNTER
Provider E-Visit time total (minutes): < 5 minutes      Called to clarify with patient is seen both cough and sore throat mentioned on different occasions in her chart.  Sore throat most bothersome  very minimal cough.  She had exposure to strep throat from both her mother and her sister.  Will treat with amoxicillin twice daily x10 days.  Patient is instructed to be seen if symptoms are worsening despite antibiotic usage.  COVID testing already completed and pending.        Milana Parra, BEVERLY-BC

## 2020-08-19 NOTE — TELEPHONE ENCOUNTER
Symptoms    Describe your symptoms: sore throat, white bumps. Mom and sister both have been diagnosed with strep    Any pain:     How long have you been having symptoms:   days    Have you been seen for this:  No- Pt had covid testing done    Appointment offered?: No    Triage offered?: No    Home remedies tried:     Requested Pharmacy: [facility]    Okay to leave a detailed message? Yes at Home number on file [phone] (home)

## 2020-08-19 NOTE — PATIENT INSTRUCTIONS
Patient Education     Pharyngitis: Strep (Presumed)    You have pharyngitis (sore throat). The healthcare staff think your sore throat is caused by streptococcus (strep) bacteria. This is often called strep throat. Strep throat can cause throat pain that is worse when swallowing, aching all over, headache, and fever. The infection is contagious. It may be spread by coughing, kissing, or touching others after touching your mouth or nose. Antibiotic medicine is given to treat the infection.  Home care    Rest at home. Drink plenty of fluids so you won t get dehydrated.    Stay home from work or school for the first 2 days of taking the antibiotics. After this time, you will not be contagious. You can then return to work or school if you are feeling better.     Take the antibiotic medicine for the full 10 days, even when you feel better. This is very important to make sure the infection is fully treated. It is also important to prevent medicine-resistant germs from growing. If you were given an antibiotic shot, no more antibiotics are needed.    You may use acetaminophen or ibuprofen to control pain or fever, unless another medicine was prescribed for this. If you have chronic liver or kidney disease or ever had a stomach ulcer or GI bleeding, talk with your healthcare provider before using these medicines.    Use throat lozenges or a throat-numbing spray to help reduce throat pain. Gargling with warm salt water can also help reduce throat pain. Dissolve 1/2 teaspoon of salt in 1 glass of warm water.     Don t eat salty or spicy foods. These can irritate the throat.  Follow-up care  Follow up with your healthcare provider or our staff if you don't get better over the next week.  When to seek medical advice  Call your healthcare provider right away if any of these occur:    Fever as directed by your healthcare provider    New or worse ear pain, sinus pain, or headache    Painful lumps in the back of neck    Stiff  neck    Lymph nodes that get larger    Can t swallow liquids, a lot of drooling, or can t open mouth wide due to throat pain    Signs of dehydration, such as very dark urine or no urine, sunken eyes, dizziness    Trouble breathing or noisy breathing    Muffled voice    New rash  Prevention  Here are steps you can take to help prevent an infection:    Keep good hand washing habits.    Don t have close contact with people who have sore throats, colds, or other upper respiratory infections.    Don t smoke, and stay away from secondhand smoke.    Stay up to date with of your vaccines.  Date Last Reviewed: 11/1/2017 2000-2019 [employer]. [address] [place] [address], [place] [address]. All rights reserved. This information is not intended as a substitute for professional medical care. Always follow your healthcare professional's instructions.

## 2020-08-19 NOTE — TELEPHONE ENCOUNTER
Reception: When patient calls back please tell her to do evisit.  I sent her instructions to her roslynt    Veronica Castellano RN- Triage [employer]

## 2020-08-20 ENCOUNTER — TELEPHONE (OUTPATIENT)
Dept: FAMILY MEDICINE | Facility: CLINIC | Age: 25
End: 2020-08-20

## 2020-08-20 NOTE — LETTER
[address] [address]  [phone]       August 24, 2020    Rayray Contreras  [address]  [place] [address]    To Whom it May Concern:    The above patient is able to attend work as long as she is fever free for over 72 hours.    Please contact me with questions or concerns.      Sincerely,    Mliana Parra, BEVERLY-BC

## 2020-08-20 NOTE — TELEPHONE ENCOUNTER
Forms/Letter Request    Name of form/letter: Return to work note    Have you been seen for this request: Yes patient did evisit yesterday and forgot to request a letter when Milana Parra CNP called to clarify information.    Do we have the form/letter: No    When is form/letter needed by: as soon as possible.    How would you like the form/letter returned: released onto [employer], patient will print it out    Patient Notified form requests are processed in 3-5 business days:Yes    Okay to leave a detailed message? Yes Cell number on file:    Telephone Information:   Mobile [phone]

## 2020-08-24 NOTE — TELEPHONE ENCOUNTER
COVID negative. Has been on antibiotics for over 24 hours for presumed strep.     Able to return to work if no fever X 72 hours.     Please help her get her note- I have had patients say they are unable to get it in my chart.   Thanks,       Milana Parra, MANPREETP-BC

## 2020-09-11 PROBLEM — M54.2 CERVICAL PAIN: Status: RESOLVED | Noted: 2020-04-27 | Resolved: 2020-09-11

## 2020-09-11 NOTE — PROGRESS NOTES
DISCHARGE REPORT    Progress reporting period is from 4-27-20 to 5-21-20.       SUBJECTIVE  Subjective changes noted by patient:  .  Subjective: I feel like I am almost at 89%--the pain is normally when I am lifting, I feel better with rest, or lying down.      Current pain level is 5/10 Current Pain level: 5/10.     Previous pain level was  7/10 Initial Pain level: 7/10.   Changes in function:  Yes (See Goal flowsheet attached for changes in current functional level)  Adverse reaction to treatment or activity: None    OBJECTIVE  Changes noted in objective findings:  Patient has failed to return to therapy so current objective findings are unknown.  Objective: Cx ROM: Ext=nil loss erp, Thx EXT=min loss; scap stabs grossly 3+/5, fatigues easily with prone arm raises.       ASSESSMENT/PLAN  Updated problem list and treatment plan: Diagnosis 1:  Bilateral scapular pain  Pain -  manual therapy, self management, education, directional preference exercise and home program  Decreased ROM/flexibility - manual therapy and therapeutic exercise  Decreased strength - therapeutic exercise and therapeutic activities  Decreased function - therapeutic activities  Impaired posture - neuro re-education  STG/LTGs have been met or progress has been made towards goals:  Yes (See Goal flow sheet completed today.)  Assessment of Progress: The patient has not returned to therapy. Current status is unknown.  Self Management Plans:  Patient is independent in a home treatment program.  Patient is independent in self management of symptoms.  I have re-evaluated this patient and find that the nature, scope, duration and intensity of the therapy is appropriate for the medical condition of the patient.  Rayray continues to require the following intervention to meet STG and LTG's:  PT intervention is no longer required to meet STG/LTG.    Recommendations:  This patient is ready to be discharged from therapy and continue their home treatment  program.    Please refer to the daily flowsheet for treatment today, total treatment time and time spent performing 1:1 timed codes.

## 2020-09-24 NOTE — PROGRESS NOTES
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 She was referred to physical therapy and also had follow up a month later with ongoing pain.  She was referred to orthopedics but did not pursue this referral.  She called into the clinic this summer and requested a release to work without restrictions.    Today she reports that her pain never completely resolved.  She states that it was mild and tolerable.  Approximately 2 weeks ago she was lifting her vacuum and felt something become strained near her bilateral scapular region.  She denies any trauma or falls.  She states that this feels nearly identical to the pain she felt in March 2020.  She has not had any imaging to date.  She has not resumed her home exercise program.  She occasionally uses her cyclobenzaprine.  She would like a refill on her naproxen.  She is hoping for restrictions at work today as she feels as though this is exacerbating her condition.    Duration: 2 wks        Specific cause: work-related    Description:   Location of pain: shoulders both  Character of pain: stabbing  Pain radiation:none  New numbness or weakness in legs, not attributed to pain:  no     Intensity: Currently 8/10    History:   Pain interferes with job: , No  History of back problems: no prior back problems  Any previous MRI or X-rays: None  Sees a specialist for back pain:  No  Therapies tried without relief: Physical Therapy - 5/14 & 5/21/2020 - discharged to home exercise program.    Alleviating factors:   Improved by: rest      Precipitating factors:  Worsened by: Bending          Accompanying Signs & Symptoms:  Risk of Fracture:  None  Risk of Cauda Equina:  None  Risk of Infection:  None  Risk of Cancer:  None  Risk of Ankylosing Spondylitis:  Onset at age <35,  male, AND morning back stiffness. no               Problem list and histories reviewed & adjusted, as indicated.  Additional history: as documented    Patient Active Problem List   Diagnosis     Developmental delay     Class 1 obesity due to excess calories without serious comorbidity with body mass index (BMI) of 32.0 to 32.9 in adult     PCOS (polycystic ovarian syndrome)     Major depression in complete remission (H)     Health Care Home     Von Willebrand's disease (H) - UNCLEAR - March of 2008 couple of heavy nose bleeds.  Tested for Von Willebrands- treated with ambicar.  Third set of testing came back negative for Von Willebrand's       Past Surgical History:   Procedure Laterality Date     NO HISTORY OF SURGERY         Social History     Tobacco Use     Smoking status: Never Smoker     Smokeless tobacco: Never Used   Substance Use Topics     Alcohol use: No     Alcohol/week: 0.0 standard drinks     Comment: once every few months      Family History   Problem Relation Age of Onset     Arthritis Mother      Diabetes Maternal Grandmother      Colon Cancer Maternal Grandmother         passed from - around 80 years old     Arthritis Maternal Grandmother      Cerebrovascular Disease Maternal Grandmother      Coronary Artery Disease Maternal Grandmother      Diabetes Maternal Grandfather      Cerebrovascular Disease Maternal Grandfather      Coronary Artery Disease Maternal Grandfather      Cerebrovascular Disease Paternal Grandmother      Diabetes Paternal Grandmother      Coronary Artery Disease Paternal Grandmother      Cerebrovascular Disease Paternal Grandfather      Diabetes Paternal Grandfather      Coronary Artery Disease Paternal Grandfather      Breast Cancer No family hx of      Thyroid Disease No family hx of          Current Outpatient Medications   Medication Sig Dispense Refill     cyclobenzaprine (FLEXERIL) 10 MG tablet 1/2 tab during the day and 1 tab at bedtime as needed for muscle spasms 20  "tablet 0     levonorgestrel-ethinyl estradiol (SEASONALE) 0.15-0.03 MG tablet TAKE 1 TABLET BY MOUTH DAILY 91 tablet 3     naproxen sodium (ANAPROX) 550 MG tablet Take 1 tablet (550 mg) by mouth 2 times daily (with meals) 60 tablet 1     Allergies   Allergen Reactions     Ibuprofen Other (See Comments)     Bleeding?  Per chart negative for Von Willebrand's x 3     Chicken-Derived Products (Egg)      Other reaction(s): GI Upset       Reviewed and updated as needed this visit by clinical staff  Reviewed and updated as needed this visit by Provider  Tobacco  Allergies  Meds  Problems  Med Hx  Surg Hx  Fam Hx           ROS:  Constitutional, HEENT, cardiovascular, pulmonary, GI, , musculoskeletal, neuro, skin, endocrine and psych systems are negative, except as otherwise noted.    OBJECTIVE:   /86   Pulse 77   Temp 97  F (36.1  C)   Ht 1.575 m (5' 2\")   Wt 82.3 kg (181 lb 8 oz)   SpO2 98%   BMI 33.20 kg/m   Body mass index is 33.2 kg/m .      GENERAL: healthy, alert and no distress  EYES: Eyes grossly normal to inspection  NECK: no adenopathy  CV: RRR, no murmur/rub  RESP: CTA bilaterally.  MS: Tenderness to shola-cervical and perithoracic musculature bilaterally.  Full range of motion of cervical spine and full range of motion of bilateral shoulders.  Tenderness to palpation of the bilateral upper trapezius musculature.  SKIN: no suspicious lesions or rashes  NEURO: Normal strength and tone, mentation intact and speech normal  PSYCH: mentation appears normal, affect normal/bright    Diagnostic Test Results:  none   ASSESSMENT/PLAN:   Rayray was seen today for follow up back pain.    Diagnoses and all orders for this visit:    Bilateral thoracic back pain, unspecified chronicity, Acute midline thoracic back pain  Recurrent thoracic back pain.  Would like to have specialist consultation to see if any imaging needs to be pursued or if continued physical therapy is more appropriate.  Refilled naproxen " and cyclobenzaprine today.  Work restrictions filled out to last through 10/31/2020.  Patient advised to start home exercise program that was established in May 2020.  -     Orthopedic & Spine  Referral; Future  -     naproxen sodium (ANAPROX) 550 MG tablet; Take 1 tablet (550 mg) by mouth 2 times daily (with meals)  -     cyclobenzaprine (FLEXERIL) 10 MG tablet; 1/2 tab during the day and 1 tab at bedtime as needed for muscle spasms    Return in about 2 weeks (around 10/9/2020) for orthopedic evaluation.     [address] [address]  [email or web address]   Office: [phone]           Jennifer Aguilar MS, PAJimC

## 2020-09-25 ENCOUNTER — OFFICE VISIT (OUTPATIENT)
Dept: FAMILY MEDICINE | Facility: CLINIC | Age: 25
End: 2020-09-25
Payer: OTHER MISCELLANEOUS

## 2020-09-25 VITALS
TEMPERATURE: 97 F | DIASTOLIC BLOOD PRESSURE: 86 MMHG | BODY MASS INDEX: 33.4 KG/M2 | OXYGEN SATURATION: 98 % | HEIGHT: 62 IN | HEART RATE: 77 BPM | WEIGHT: 181.5 LBS | SYSTOLIC BLOOD PRESSURE: 120 MMHG

## 2020-09-25 DIAGNOSIS — M54.6 BILATERAL THORACIC BACK PAIN, UNSPECIFIED CHRONICITY: Primary | ICD-10-CM

## 2020-09-25 DIAGNOSIS — M54.6 ACUTE MIDLINE THORACIC BACK PAIN: ICD-10-CM

## 2020-09-25 PROCEDURE — 99214 OFFICE O/P EST MOD 30 MIN: CPT | Performed by: PHYSICIAN ASSISTANT

## 2020-09-25 RX ORDER — CYCLOBENZAPRINE HCL 10 MG
TABLET ORAL
Qty: 20 TABLET | Refills: 0 | Status: SHIPPED | OUTPATIENT
Start: 2020-09-25 | End: 2021-03-08

## 2020-09-25 RX ORDER — NAPROXEN SODIUM 550 MG/1
550 TABLET ORAL 2 TIMES DAILY WITH MEALS
Qty: 60 TABLET | Refills: 1 | Status: SHIPPED | OUTPATIENT
Start: 2020-09-25 | End: 2021-03-08

## 2020-09-25 ASSESSMENT — MIFFLIN-ST. JEOR: SCORE: 1511.53

## 2020-10-02 ENCOUNTER — OFFICE VISIT (OUTPATIENT)
Dept: NEUROSURGERY | Facility: CLINIC | Age: 25
End: 2020-10-02
Attending: PHYSICIAN ASSISTANT
Payer: OTHER MISCELLANEOUS

## 2020-10-02 VITALS
HEIGHT: 62 IN | HEART RATE: 68 BPM | TEMPERATURE: 98.4 F | BODY MASS INDEX: 32.87 KG/M2 | OXYGEN SATURATION: 98 % | DIASTOLIC BLOOD PRESSURE: 82 MMHG | WEIGHT: 178.6 LBS | SYSTOLIC BLOOD PRESSURE: 131 MMHG

## 2020-10-02 DIAGNOSIS — M54.6 BILATERAL THORACIC BACK PAIN, UNSPECIFIED CHRONICITY: ICD-10-CM

## 2020-10-02 PROCEDURE — 99203 OFFICE O/P NEW LOW 30 MIN: CPT | Performed by: PHYSICIAN ASSISTANT

## 2020-10-02 PROCEDURE — G0463 HOSPITAL OUTPT CLINIC VISIT: HCPCS

## 2020-10-02 ASSESSMENT — MIFFLIN-ST. JEOR: SCORE: 1498.37

## 2020-10-02 ASSESSMENT — PAIN SCALES - GENERAL: PAINLEVEL: EXTREME PAIN (8)

## 2020-10-02 NOTE — NURSING NOTE
"Rayray Contreras is a 25 year old female who presents for:  Chief Complaint   Patient presents with     Neurologic Problem     chronic bilateral thoracic back pain        Initial Vitals:  /82 (BP Location: Right arm, Patient Position: Sitting, Cuff Size: Adult Large)   Pulse 68   Temp 98.4  F (36.9  C) (Oral)   Ht 5' 2\" (1.575 m)   Wt 178 lb 9.6 oz (81 kg)   SpO2 98%   BMI 32.67 kg/m   Estimated body mass index is 32.67 kg/m  as calculated from the following:    Height as of this encounter: 5' 2\" (1.575 m).    Weight as of this encounter: 178 lb 9.6 oz (81 kg).. Body surface area is 1.88 meters squared. BP completed using cuff size: large  Extreme Pain (8)    Nursing Comments: wants to know if she can work, looking to a work form or note.     Sandro Waddell, [facility]    "

## 2020-10-02 NOTE — PROGRESS NOTES
NEUROSURGERY CLINIC CONSULT NOTE     DATE OF VISIT: 10/2/2020     SUBJECTIVE:     Rayray Contreras is a pleasant 25 year old female who presents to the clinic today for consultation on thoracic pain. She is referred to the Neurosurgery Clinic by Dr. Aguilar in Primary Care. This visit is a workman's comp clain as she was lifting a vacuum on her cart.   Today, she reports a 7-month history of symptoms. She describes constant, sharp, pain that initiates interscapular and really does not radiate distally or proximally. This pain is not accompanied by paresthesia, numbness and/or perceived weakness. Lifting at work seems to aggravate the symptoms, while alleviation is obtained by lying down. There are no bowel or bladder changes. She has participated in physical therapy and has tried muscle relaxants.          Current Outpatient Medications:      cyclobenzaprine (FLEXERIL) 10 MG tablet, 1/2 tab during the day and 1 tab at bedtime as needed for muscle spasms, Disp: 20 tablet, Rfl: 0     levonorgestrel-ethinyl estradiol (SEASONALE) 0.15-0.03 MG tablet, TAKE 1 TABLET BY MOUTH DAILY, Disp: 91 tablet, Rfl: 3     naproxen sodium (ANAPROX) 550 MG tablet, Take 1 tablet (550 mg) by mouth 2 times daily (with meals), Disp: 60 tablet, Rfl: 1     Allergies   Allergen Reactions     Ibuprofen Other (See Comments)     Bleeding?  Per chart negative for Von Willebrand's x 3     Chicken-Derived Products (Egg)      Other reaction(s): GI Upset        Past Medical History:   Diagnosis Date     Concussion without loss of consciousness 9/1/2015     Developmental delay      Major depressive disorder, single episode, mild (H) 7/15/2010     Muscle cramp 5/14/2009    Has botox injection in gastrocnemius.     PCOS (polycystic ovarian syndrome) 7/15/2010        ROS: 10 point review of symptoms are negative other than the symptoms noted above in the HPI.     Family History has been reviewed with the patient, there are no pertinent findings to  "presenting concern.     Past Surgical History:   Procedure Laterality Date     NO HISTORY OF SURGERY          Social History     Tobacco Use     Smoking status: Never Smoker     Smokeless tobacco: Never Used   Substance Use Topics     Alcohol use: No     Alcohol/week: 0.0 standard drinks     Comment: once every few months      Drug use: No        OBJECTIVE:   /82 (BP Location: Right arm, Patient Position: Sitting, Cuff Size: Adult Large)   Pulse 68   Temp 98.4  F (36.9  C) (Oral)   Ht 5' 2\" (1.575 m)   Wt 178 lb 9.6 oz (81 kg)   SpO2 98%   BMI 32.67 kg/m     Body mass index is 32.67 kg/m .     Imaging:     None    Exam:     Patient appears comfortable, conversational, and in no apparent distress.   Head: Normocephalic, without obvious abnormality, atraumatic, no facial asymmetry.   Eyes: conjunctivae/corneas clear. PERRL, EOM's intact.   Throat: lips, mucosa, and tongue normal; teeth and gums normal.   Neck: supple, symmetrical, trachea midline, no adenopathy and thyroid: not enlarged, symmetric, no tenderness/mass/nodules.   Lungs: clear to auscultation bilaterally.   Heart: regular rate and rhythm.   Abdomen: soft, non-tender; bowel sounds normal; no masses, no organomegaly.   Pulses: 2+ and symmetric.   Skin: Skin color, texture, turgor normal. No rashes or lesions.     CN II-XII grossly intact, alert and appropriate with conversation and following commands.   Gait is non-antalgic. Able to tandem walk. Able to walk on toes and heels without difficulty.   Cervical spine is non tender to palpation. Appropriate range of motion of neck, not concerning for lhermitte's phenomenon.   Bilateral bicep 2/4 and tricep reflexes 1/4. Sensation intact throughout upper extremities.     UE muscle strength  Right  Left    Deltoid  5/5  5/5    Biceps  5/5  5/5    Triceps  5/5  5/5    Hand intrinsics  5/5  5/5    Hand grasp  5/5  5/5    Armendariz signs  neg  neg      Lumbar spine is non tender to palpation   Intact " sensation throughout lower extremities.   Bilateral patellar 2/4 and achilles reflex 1/4. Negative for pain with straight leg raise.     LE muscle strength  Right  Left    Iliopsoas (hip flexion)  5/5  5/5    Quad (knee extension)  5/5  5/5    Hamstring (knee flexion)  5/5  5/5    Gastrocnemius (PF)  5/5  5/5    Tibialis Ant. (DF)  5/5  5/5    EHL  5/5  5/5      Negative Babinski bilaterally. Negative for clonus.   Calves are soft and non-tender bilaterally.     ASSESSMENT/PLAN:     Rayray Contreras is a 25 year old female who presents to the clinic for consultation on interscapular pain as a result of lifting an object at work. There is no imaging to review today. On exam, she is noted to have appropriate strength, sensation and range of motion. She has attempted conservative management with physical therapy and NSAIDS, without resolution of symptoms.     Based on her physical exam, we feel that it would be in her best interest to try a conservative approach by participating in a physical therapy program. We also discussed obtaining a thoracic MRI. She would like to hold off on both options at this time and talk to her  representative.      She will contact our office.        Respectfully,     MARYJO Vila, JORGE  [facility] Neurosurgery  [facility]  Tel: [phone]  Pager: [phone]     Exam, imaging, and plan reviewed by Dr. Boggs.

## 2020-10-02 NOTE — LETTER
10/2/2020         RE: Rayray Contreras  [address] [place] [address]        Dear Colleague,    Thank you for referring your patient, Rayray Contreras, to the [facility] [place]. Please see a copy of my visit note below.    NEUROSURGERY CLINIC CONSULT NOTE     DATE OF VISIT: 10/2/2020     SUBJECTIVE:     Rayray Contreras is a pleasant 25 year old female who presents to the clinic today for consultation on thoracic pain. She is referred to the Neurosurgery Clinic by Dr. Aguilar in Primary Care. This visit is a workman's comp clain as she was lifting a vacuum on her cart.   Today, she reports a 7-month history of symptoms. She describes constant, sharp, pain that initiates interscapular and really does not radiate distally or proximally. This pain is not accompanied by paresthesia, numbness and/or perceived weakness. Lifting at work seems to aggravate the symptoms, while alleviation is obtained by lying down. There are no bowel or bladder changes. She has participated in physical therapy and has tried muscle relaxants.          Current Outpatient Medications:      cyclobenzaprine (FLEXERIL) 10 MG tablet, 1/2 tab during the day and 1 tab at bedtime as needed for muscle spasms, Disp: 20 tablet, Rfl: 0     levonorgestrel-ethinyl estradiol (SEASONALE) 0.15-0.03 MG tablet, TAKE 1 TABLET BY MOUTH DAILY, Disp: 91 tablet, Rfl: 3     naproxen sodium (ANAPROX) 550 MG tablet, Take 1 tablet (550 mg) by mouth 2 times daily (with meals), Disp: 60 tablet, Rfl: 1     Allergies   Allergen Reactions     Ibuprofen Other (See Comments)     Bleeding?  Per chart negative for Von Willebrand's x 3     Chicken-Derived Products (Egg)      Other reaction(s): GI Upset        Past Medical History:   Diagnosis Date     Concussion without loss of consciousness 9/1/2015     Developmental delay      Major depressive disorder, single episode, mild (H) 7/15/2010     Muscle cramp 5/14/2009    Has botox  "injection in gastrocnemius.     PCOS (polycystic ovarian syndrome) 7/15/2010        ROS: 10 point review of symptoms are negative other than the symptoms noted above in the HPI.     Family History has been reviewed with the patient, there are no pertinent findings to presenting concern.     Past Surgical History:   Procedure Laterality Date     NO HISTORY OF SURGERY          Social History     Tobacco Use     Smoking status: Never Smoker     Smokeless tobacco: Never Used   Substance Use Topics     Alcohol use: No     Alcohol/week: 0.0 standard drinks     Comment: once every few months      Drug use: No        OBJECTIVE:   /82 (BP Location: Right arm, Patient Position: Sitting, Cuff Size: Adult Large)   Pulse 68   Temp 98.4  F (36.9  C) (Oral)   Ht 5' 2\" (1.575 m)   Wt 178 lb 9.6 oz (81 kg)   SpO2 98%   BMI 32.67 kg/m     Body mass index is 32.67 kg/m .     Imaging:     None    Exam:     Patient appears comfortable, conversational, and in no apparent distress.   Head: Normocephalic, without obvious abnormality, atraumatic, no facial asymmetry.   Eyes: conjunctivae/corneas clear. PERRL, EOM's intact.   Throat: lips, mucosa, and tongue normal; teeth and gums normal.   Neck: supple, symmetrical, trachea midline, no adenopathy and thyroid: not enlarged, symmetric, no tenderness/mass/nodules.   Lungs: clear to auscultation bilaterally.   Heart: regular rate and rhythm.   Abdomen: soft, non-tender; bowel sounds normal; no masses, no organomegaly.   Pulses: 2+ and symmetric.   Skin: Skin color, texture, turgor normal. No rashes or lesions.     CN II-XII grossly intact, alert and appropriate with conversation and following commands.   Gait is non-antalgic. Able to tandem walk. Able to walk on toes and heels without difficulty.   Cervical spine is non tender to palpation. Appropriate range of motion of neck, not concerning for lhermitte's phenomenon.   Bilateral bicep 2/4 and tricep reflexes 1/4. Sensation intact " throughout upper extremities.     UE muscle strength  Right  Left    Deltoid  5/5  5/5    Biceps  5/5  5/5    Triceps  5/5  5/5    Hand intrinsics  5/5  5/5    Hand grasp  5/5  5/5    Armendariz signs  neg  neg      Lumbar spine is non tender to palpation   Intact sensation throughout lower extremities.   Bilateral patellar 2/4 and achilles reflex 1/4. Negative for pain with straight leg raise.     LE muscle strength  Right  Left    Iliopsoas (hip flexion)  5/5  5/5    Quad (knee extension)  5/5  5/5    Hamstring (knee flexion)  5/5  5/5    Gastrocnemius (PF)  5/5  5/5    Tibialis Ant. (DF)  5/5  5/5    EHL  5/5  5/5      Negative Babinski bilaterally. Negative for clonus.   Calves are soft and non-tender bilaterally.     ASSESSMENT/PLAN:     Rayray Contreras is a 25 year old female who presents to the clinic for consultation on interscapular pain as a result of lifting an object at work. There is no imaging to review today. On exam, she is noted to have appropriate strength, sensation and range of motion. She has attempted conservative management with physical therapy and NSAIDS, without resolution of symptoms.     Based on her physical exam, we feel that it would be in her best interest to try a conservative approach by participating in a physical therapy program. We also discussed obtaining a thoracic MRI. She would like to hold off on both options at this time and talk to her  representative.      She will contact our office.        Respectfully,     MARYJO Vila PA-C  [facility] Neurosurgery  [facility]  Tel: [phone]  Pager: [phone]     Exam, imaging, and plan reviewed by Dr. Boggs.       Again, thank you for allowing me to participate in the care of your patient.        Sincerely,        Pablo Ahuja PA-C

## 2020-10-12 ENCOUNTER — TELEPHONE (OUTPATIENT)
Facility: CLINIC | Age: 25
End: 2020-10-12

## 2020-10-12 NOTE — TELEPHONE ENCOUNTER
Per LOV with Pablo Ahuja PA-C on 10/2: We also discussed obtaining a thoracic MRI. She would like to hold off on both options at this time and talk to her WC representative.     Attempted to reach out to patient, no answer. Left voice message for patient to call clinic back to further discuss.

## 2020-10-12 NOTE — TELEPHONE ENCOUNTER
Patient is calling requesting an MRI order. Patient stating that she wants to do the MRI now through . Please call the patient to see if this is possible or if she needs to schedule with Mick bravo. [phone]

## 2020-10-12 NOTE — TELEPHONE ENCOUNTER
Patient spoke to her work comp representative and they are awaiting to hear from clinic in regards to the MRI. Work comp insurance is [facility]. She states we can call Anita with [facility] at [phone].

## 2020-10-13 ENCOUNTER — TELEPHONE (OUTPATIENT)
Dept: NEUROSURGERY | Facility: CLINIC | Age: 25
End: 2020-10-13

## 2020-10-13 NOTE — TELEPHONE ENCOUNTER
If the patient wants a thoracic MRI without contrast we can order that, she prefers to just proceed with physical therapy first and then follow-up with Alphonso and see if further imaging is needed that is an option as well.

## 2020-10-13 NOTE — TELEPHONE ENCOUNTER
Left voice message asking patient which route she would like to proceed with. Asked she call clinic back with what she would like to proceed with.

## 2020-10-14 ENCOUNTER — TELEPHONE (OUTPATIENT)
Dept: NEUROSURGERY | Facility: CLINIC | Age: 25
End: 2020-10-14

## 2020-10-14 DIAGNOSIS — M54.6 BILATERAL THORACIC BACK PAIN, UNSPECIFIED CHRONICITY: Primary | ICD-10-CM

## 2020-10-22 ENCOUNTER — DOCUMENTATION ONLY (OUTPATIENT)
Dept: NEUROSURGERY | Facility: CLINIC | Age: 25
End: 2020-10-22

## 2020-10-22 ASSESSMENT — PATIENT HEALTH QUESTIONNAIRE - PHQ9
SUM OF ALL RESPONSES TO PHQ QUESTIONS 1-9: 5
SUM OF ALL RESPONSES TO PHQ QUESTIONS 1-9: 5
10. IF YOU CHECKED OFF ANY PROBLEMS, HOW DIFFICULT HAVE THESE PROBLEMS MADE IT FOR YOU TO DO YOUR WORK, TAKE CARE OF THINGS AT HOME, OR GET ALONG WITH OTHER PEOPLE: NOT DIFFICULT AT ALL

## 2020-10-22 NOTE — TELEPHONE ENCOUNTER
Message from Rayray, requesting a full back MRI referral order be sent to Insurance for Work Comp.    Insurance contact is:  Rachele Hagen (sp?)  PH# [phone]  Fax# [phone]    Please call back to confirm. Notes she gets off work at 4:00.

## 2020-10-22 NOTE — TELEPHONE ENCOUNTER
Patient would like MRI order to be sent to her work comp contact listed below. Order placed and faxed.

## 2020-10-23 ENCOUNTER — OFFICE VISIT (OUTPATIENT)
Dept: FAMILY MEDICINE | Facility: CLINIC | Age: 25
End: 2020-10-23
Payer: COMMERCIAL

## 2020-10-23 VITALS
TEMPERATURE: 98.9 F | OXYGEN SATURATION: 99 % | HEART RATE: 90 BPM | SYSTOLIC BLOOD PRESSURE: 124 MMHG | BODY MASS INDEX: 33.49 KG/M2 | WEIGHT: 182 LBS | HEIGHT: 62 IN | DIASTOLIC BLOOD PRESSURE: 74 MMHG

## 2020-10-23 DIAGNOSIS — F33.0 MILD EPISODE OF RECURRENT MAJOR DEPRESSIVE DISORDER (H): ICD-10-CM

## 2020-10-23 PROCEDURE — 99214 OFFICE O/P EST MOD 30 MIN: CPT | Performed by: NURSE PRACTITIONER

## 2020-10-23 ASSESSMENT — ANXIETY QUESTIONNAIRES
IF YOU CHECKED OFF ANY PROBLEMS ON THIS QUESTIONNAIRE, HOW DIFFICULT HAVE THESE PROBLEMS MADE IT FOR YOU TO DO YOUR WORK, TAKE CARE OF THINGS AT HOME, OR GET ALONG WITH OTHER PEOPLE: NOT DIFFICULT AT ALL
3. WORRYING TOO MUCH ABOUT DIFFERENT THINGS: SEVERAL DAYS
GAD7 TOTAL SCORE: 6
5. BEING SO RESTLESS THAT IT IS HARD TO SIT STILL: NOT AT ALL
1. FEELING NERVOUS, ANXIOUS, OR ON EDGE: SEVERAL DAYS
7. FEELING AFRAID AS IF SOMETHING AWFUL MIGHT HAPPEN: NOT AT ALL
6. BECOMING EASILY ANNOYED OR IRRITABLE: MORE THAN HALF THE DAYS
2. NOT BEING ABLE TO STOP OR CONTROL WORRYING: MORE THAN HALF THE DAYS

## 2020-10-23 ASSESSMENT — PATIENT HEALTH QUESTIONNAIRE - PHQ9
SUM OF ALL RESPONSES TO PHQ QUESTIONS 1-9: 5
5. POOR APPETITE OR OVEREATING: NOT AT ALL

## 2020-10-23 ASSESSMENT — MIFFLIN-ST. JEOR: SCORE: 1513.8

## 2020-10-23 NOTE — PROGRESS NOTES
Subjective     Rayray Contreras is a 25 year old female who presents to clinic today for the following health issues:    HPI          Answers for HPI/ROS submitted by the patient on 10/22/2020   Chronic problems general questions HPI Form  How many servings of fruits and vegetables do you eat daily?: 0-1  On average, how many sweetened beverages do you drink each day (Examples: soda, juice, sweet tea, etc.  Do NOT count diet or artificially sweetened beverages)?: 1  How many minutes a day do you exercise enough to make your heart beat faster?: 9 or less  How many days a week do you exercise enough to make your heart beat faster?: 3 or less  How many days per week do you miss taking your medication?: 1  What makes it hard for you to take your medication every day?: remembering to take  Depression/Anxiety: Depression  Status since last visit:: medium  Other associated symptoms of depression:: Yes  Significant life event: : other  Anxious:: No  Current substance use:: No  If you checked off any problems, how difficult have these problems made it for you to do your work, take care of things at home, or get along with other people?: Not difficult at all  PHQ9 TOTAL SCORE: 5    KWAKU-7 SCORE 8/26/2019 3/31/2020 10/23/2020   Total Score - - -   Total Score 1 9 6     PHQ 8/26/2019 3/31/2020 10/22/2020   PHQ-9 Total Score 2 15 5   Q9: Thoughts of better off dead/self-harm past 2 weeks Not at all Not at all Not at all     Patient reports angry outbursts at home with family.  Her family is concerned about this.    She reports feelings of anxiety if she is late for an appointment, needs to pay a bill or doesn't have enough money.    Is feeling general sadness.    Denies suicidal ideation.      Is working full-time in housekeeping at [employer].    Last on Wellbutrin  mg in 2014 and then discontinued.  Doesn't feel like Wellbutrin was helpful previously.        Review of Systems   Constitutional, HEENT,  "cardiovascular, pulmonary, gi and gu systems are negative, except as otherwise noted.      Objective    /74 (BP Location: Right arm, Cuff Size: Adult Regular)   Pulse 90   Temp 98.9  F (37.2  C) (Tympanic)   Ht 1.575 m (5' 2\")   Wt 82.6 kg (182 lb)   SpO2 99%   BMI 33.29 kg/m    Body mass index is 33.29 kg/m .  Physical Exam     GENERAL: healthy, alert and no distress  RESP: lungs clear to auscultation - no rales, rhonchi or wheezes  CV: regular rate and rhythm, normal S1 S2, no S3 or S4, no murmur, click or rub, no peripheral edema  PSYCH: mentation appears normal, affect normal/bright            Assessment & Plan     Rayray was seen today for depression.    Diagnoses and all orders for this visit:    Mild episode of recurrent major depressive disorder (H)  PHQ 8/26/2019 3/31/2020 10/22/2020   PHQ-9 Total Score 2 15 5   Q9: Thoughts of better off dead/self-harm past 2 weeks Not at all Not at all Not at all     KWAKU-7 SCORE 8/26/2019 3/31/2020 10/23/2020   Total Score - - -   Total Score 1 9 6     Will initiate Sertraline and closely follow-up in 1 month, sooner as needed.    -     sertraline (ZOLOFT) 50 MG tablet; Take 1/2 tablet (25 mg) by mouth once daily for 7 days and then increase to 1 tablet (50 mg) by mouth once daily.        Return in about 1 month (around 11/23/2020) for Mental Health follow-up, In Clinic.    Leslie Marino, ELKIN CNP  [facility] [place]    "

## 2020-10-24 ASSESSMENT — ANXIETY QUESTIONNAIRES: GAD7 TOTAL SCORE: 6

## 2020-10-28 ENCOUNTER — HOSPITAL ENCOUNTER (OUTPATIENT)
Dept: MRI IMAGING | Facility: CLINIC | Age: 25
Discharge: HOME | End: 2020-10-28
Attending: PHYSICIAN ASSISTANT | Admitting: PHYSICIAN ASSISTANT
Payer: OTHER MISCELLANEOUS

## 2020-10-28 DIAGNOSIS — M54.6 BILATERAL THORACIC BACK PAIN, UNSPECIFIED CHRONICITY: ICD-10-CM

## 2020-10-28 PROCEDURE — 72146 MRI CHEST SPINE W/O DYE: CPT

## 2020-11-16 ENCOUNTER — TELEPHONE (OUTPATIENT)
Dept: NEUROSURGERY | Facility: CLINIC | Age: 25
End: 2020-11-16

## 2020-11-16 DIAGNOSIS — M54.6 BILATERAL THORACIC BACK PAIN, UNSPECIFIED CHRONICITY: Primary | ICD-10-CM

## 2020-11-16 NOTE — TELEPHONE ENCOUNTER
Reason For Call: Patient would like a call back with results from a thoracic MRI performed on 10/28/20, she can be reached at [phone].

## 2020-11-16 NOTE — TELEPHONE ENCOUNTER
Per Pablo Ahuja PA-C: Called. No answer but left message. Explained that her MRI exhibits a right central disc herniation at T6-7. Please order a ILESI at T6-7 or a referral to [facility] if she would like.    Patient would like to move forward with injection. Placed with pain management.

## 2020-11-18 ENCOUNTER — TELEPHONE (OUTPATIENT)
Dept: PALLIATIVE MEDICINE | Facility: CLINIC | Age: 25
End: 2020-11-18

## 2020-11-18 NOTE — TELEPHONE ENCOUNTER
Screening Questions for Radiology Injections:    Injection to be done at which interventional clinic site? [facility]    If [facility] location, tell patient that this procedure requires a COVID-19 lab test be done within 4 days of the procedure. Would you still like to move forward with scheduling the procedure?  Not Applicable   If YES, let patient know that someone will call them to schedule the COVID-19 test and that they will only receive a call back if the result is positive. Route to nursing to enter order.     Instruct patient to arrive as directed prior to the scheduled appointment time:    [place] minutes before      [place]helle: 30 minutes before; if IV needed 1 hour before     Procedure ordered by [facility]     Procedure ordered? ILESI at T6-7      Transforaminal Cervical HARVINDER - no pain provider currently performing    As a reminder, receiving steroids can decrease your body's ability to fight infection.   Would you still like to move forward with scheduling the injection?  Yes     What insurance would patient like us to bill for this procedure? WC      Worker's comp or MVA (motor vehicle accident) -Any injection DO NOT SCHEDULE and route to Cait Amaya.      [employer] insurance - For SI joint injections, DO NOT SCHEDULE and route Cait Amaya.       ALL BCBS, [employer] and [employer]-Route to Cait for review DO NOT SCHEDULE      IF SCHEDULING IN [place] AND NEEDS A PA, IT IS OKAY TO SCHEDULE. [place] HANDLES THEIR OWN PA'S AFTER THE PATIENT IS SCHEDULED. PLEASE SCHEDULE AT LEAST 1 WEEK OUT SO A PA CAN BE OBTAINED.    Any chance of pregnancy? NO   If YES, do NOT schedule and route to RN pool    Is an  needed? No     Patient has a drive home? (mandatory) YES: informed     Is patient taking any blood thinners (i.e. plavix, coumadin, jantoven, warfarin, heparin, pradaxa or dabigatran, etc)? No   If hold needed, do NOT schedule, route to RN pool     Is patient taking  any aspirin products (includes Excedrin and Fiorinal)? No     If more than 325mg/day, OK to schedule; Instruct pt to decrease to less than 325 mg for 7 days AND route to RN pool    For CERVICAL procedures, hold all aspirin products for 6 days.     Tell pt that if aspirin product is not held for 6 days, the procedure WILL BE cancelled.      Does the patient have a bleeding or clotting disorder? No     If YES, okay to schedule AND route to RN nurse pool    For any patients with platelet count <100, must be forwarded to provider    Is patient diabetic?  No  If YES, instruct them to bring their glucometer.    Does patient have an active infection or treated for one within the past week? No     Is patient currently taking any antibiotics?  No     For patients on chronic, preventative, or prophylactic antibiotics, procedures may be scheduled.     For patients on antibiotics for active or recent infection:antibiotic course must have been completed for 4 days    Is patient currently taking any steroid medications? (i.e. Prednisone, Medrol)  No     For patients on steroid medications, course must have been completed for 4 days    Is patient actively being treated for cancer or immunocompromised? No  If YES, do NOT schedule and route to RN pool     Are you able to get on and off an exam table with minimal or no assistance? Yes  If NO, do NOT schedule and route to RN pool    Are you able to roll over and lay on your stomach with minimal or no assistance? Yes  If NO, do NOT schedule and route to RN pool     Any allergies to contrast dye, iodine, shellfish, or numbing and steroid medications? No  If YES, route to RN pool AND add allergy information to appointment notes    Allergies: Ibuprofen and Chicken-derived products (egg)      Has the patient had a flu shot or any other vaccinations within 7 days before or after the procedure.  No     Does patient have an MRI/CT?  YES: 2020  Check Procedure Scheduling Grid to see if  required.      Was the MRI done within the last 3 years?  Yes    If yes, where was the MRI done i.e.[facility], [facility], [facility], [facility] etc? [facility]       If no, do not schedule and route to RN pool    If MRI was not done at [facility], [facility] or [facility] do NOT schedule and route to RN pool.      If pt has an imaging disc, the injection MAY be scheduled but pt has to bring disc to appt.     If they show up without the disc the injection cannot be done    Procedure Specific Instructions:      If celiac plexus block, informed patient NPO for 6 hours and that it is okay to take medications with sips of water, especially blood pressure medications  Not Applicable         If this is for a cervical procedure, informed patient that aspirin needs to be held for 6 days.   Not Applicable      If IV needed:    Do not schedule procedures requiring IV placement in the first appointment of the day or first appointment after lunch. Do NOT schedule at 0745, 0815 or 1245.     Instructed pt to arrive 30 minutes early for IV start if required. (Check Procedure Scheduling Grid)  NO    Reminders:      If you are started on any steroids or antibiotics between now and your appointment, you must contact us because the procedure may need to be cancelled.  No      For all procedures except radiofrequency ablations (RFAs) and spinal cord stimulator (SCS) trials, informed patient:    IV sedation is not provided for this procedure.  If you feel that an oral anti-anxiety medication is needed, you can discuss this further with your referring provider or primary care provider.  The Pain Clinic provider will discuss specifics of what the procedure includes at your appointment.  Most procedures last 10-20 minutes.  We use numbing medications to help with any discomfort during the procedure.  Not Applicable      For patients 85 or older we recommend having an adult stay w/ them for the remainder of the day.       Does the patient  have any questions?  NO  Kathleen Means  [facility] [place]

## 2020-11-19 NOTE — TELEPHONE ENCOUNTER
Left VM for patient, we are needing the adjusters name, phone number and fax number.      Cait BORDEN    [facility]

## 2020-11-19 NOTE — TELEPHONE ENCOUNTER
Pt returning phone call.    Adjustor's Name: Rachele Minor  Phone: [phone]  Fax: [phone]    Aurora JUNG    [facility]

## 2020-11-20 NOTE — TELEPHONE ENCOUNTER
Faxed completed PA form and supporting documentation for TESI to .  Right fax confirmation          Cait BORDEN    [place] [facility]

## 2020-11-24 NOTE — TELEPHONE ENCOUNTER
Incoming fax from [employer] [facility] approved.       Kesha to schedule      Cait BORDEN    [facility]

## 2020-11-24 NOTE — TELEPHONE ENCOUNTER
JENNIFER to schedule ILESI at T6-7.    Aurora JUNG    [facility]

## 2020-11-25 ENCOUNTER — MYC MEDICAL ADVICE (OUTPATIENT)
Dept: PALLIATIVE MEDICINE | Facility: CLINIC | Age: 25
End: 2020-11-25

## 2020-11-25 NOTE — TELEPHONE ENCOUNTER
[employer] message from patient on 11/25/2020 at 1017:  Hi doctor Jak my name is Rayray Contreras. 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 I was wondering if I need to take off work next Friday December 4,2020 after I get my cortisone shot Thursday December 3,2020.  ______________________________________    [employer] message sent to patient:  Good Morning Rayray,    Part of our discharge instructions say to avoid strenuous activity for 24 hours after the procedure.  Usually people are able to return to their normal activities the following day. It is very rare someone needs to miss work the following day due to this procedure.   You should be able to return to work.       Take Care,    Miladys Lynn RN  Care Coordinator  [facility]

## 2020-11-30 NOTE — PROGRESS NOTES
[facility] - Procedure Note    Date of Visit: 12/3/2020    Procedure performed: T6-T7 interlaminar epidural steroid injection with fluoroscopic guidance  Diagnosis: Thoracic spondylosis; Thoracic radiculitis/radiculopathy  : Iliana Benedict MD & Joe Wong DO (pain fellow)   Anesthesia: none    Indications: Rayray Contreras is a 25 year old female who is seen at the request of Pablo Ahuja PA-C for thoracic epidural steroid injection. The patient describes bilateral chest pain which began after a work lifting injury in March 2020. The patient has been exhibiting symptoms consistent with thoracic intraspinal inflammation and radiculopathy. Symptoms have been persistent, disabling, and intermittently severe. The patient reports minimal improvement with conservative treatment, including PT and medications.    Thoracic MRI was done on 10/28/2020 which showed   FINDINGS: Normal sagittal alignment. Normal vertebral body heights.  Marrow signal is within normal limits. Normal morphology and signal  intensity of the spinal cord.     Mild to moderate degenerative disc height loss at T6-T7 and T8-T9. The  other intervertebral discs appear relatively maintained in height. At  T6-T7, there is a right central disc herniation which mildly indents  the right ventral aspect of the cord without cord signal change. No  other significant disc herniation identified. No significant spinal  canal stenosis. No significant neural foraminal stenosis. There appear  to be trace layering bilateral pleural effusions. The paravertebral  soft tissues appear within normal limits.                                                                      IMPRESSION:  1. Mild multilevel degenerative disease, as described. No high-grade  spinal canal or neural foraminal stenosis.  2. Trace layering bilateral pleural effusions.    Allergies:      Allergies   Allergen Reactions     Ibuprofen Other (See  Comments)     Bleeding?  Per chart negative for Von Willebrand's x 3     Chicken-Derived Products (Egg)      Other reaction(s): GI Upset        Vitals:  /76   Pulse 72   SpO2 98%     Review of Systems: The patient denies recent fever, chills, illness, use of antibiotics or anticoagulants. All other 10-point review of systems negative.     Procedure: The procedure and risks were explained, and informed written consent was obtained from the patient. Risks include but are not limited to: infection, bleeding, increased pain, and damage to soft tissue, nerve, muscle, and vasculature structures. After getting informed consent, patient was brought into the procedure suite and was placed in a prone position on the procedure table. A Pause for the Cause was performed. Patient was prepped and draped in sterile fashion.     The T6-T7 interspace was identified with use of fluoroscopy in AP view. A 25-gauge, 1.5 inch needle was used to anesthetize the skin and subcutaneous tissue entry site with a total of 2 ml of 1% lidocaine. Under fluoroscopic visualization, a 22-gauge, 4.5 inch Tuohy epidural needle was slowly advanced towards the epidural space a few millimeters left of midline. The latter part of the needle advancement was guided with fluoroscopy in the lateral view. The epidural space was identified using loss of resistance technique. After negative aspiration for heme and cerebrospinal fluid, a total of 1 mL of non-ionic contrast was injected to confirm needle placement. 9 mL of contrast was wasted. Epidurogram confirmed spread within the posterior epidural space. 2 ml of 10mg/ml of dexamethasone and 1 ml of preservative free saline was injected. The needle was removed.  Images were saved to PACS.    The patient tolerated the procedure well, and there was no evidence of procedural complications. No new sensory or motor deficits were noted following the procedure. The patient was stable and able to ambulate on  discharge home. Post-procedure instructions were provided.     Pre-procedure pain score: 8/10 in the back  Post-procedure pain score: 5/10 in the back    Assessment/Plan: Rayray Contreras is a 25 year old female s/p thoracic interlaminar epidural steroid injection today for thoracic spondylosis and radiculitis/radiculopathy.     1. Following today's procedure, the patient was advised to contact the Pain Management Center for any of the following:   Fever, chills, or night sweats   New onset of pain, numbness, or weakness   Any questions/concerns regarding the procedure  If unable to contact the Pain Center, the patient was instructed to go to a local Emergency Room for any complications.   2. The patient will receive a follow-up call in 1 week.   3. Follow-up with the referring provider in 2 weeks for post-procedure evaluation.      YOANA VENTURA MD   Pain Management & Addiction Medicine

## 2020-12-03 ENCOUNTER — ANCILLARY PROCEDURE (OUTPATIENT)
Dept: GENERAL RADIOLOGY | Facility: CLINIC | Age: 25
End: 2020-12-03
Attending: ANESTHESIOLOGY
Payer: OTHER MISCELLANEOUS

## 2020-12-03 ENCOUNTER — RADIOLOGY INJECTION OFFICE VISIT (OUTPATIENT)
Dept: PALLIATIVE MEDICINE | Facility: CLINIC | Age: 25
End: 2020-12-03
Payer: OTHER MISCELLANEOUS

## 2020-12-03 VITALS — DIASTOLIC BLOOD PRESSURE: 89 MMHG | OXYGEN SATURATION: 99 % | SYSTOLIC BLOOD PRESSURE: 136 MMHG | HEART RATE: 70 BPM

## 2020-12-03 DIAGNOSIS — M54.14 THORACIC RADICULOPATHY: ICD-10-CM

## 2020-12-03 DIAGNOSIS — M47.24 OSTEOARTHRITIS OF SPINE WITH RADICULOPATHY, THORACIC REGION: Primary | ICD-10-CM

## 2020-12-03 DIAGNOSIS — M54.6 BILATERAL THORACIC BACK PAIN, UNSPECIFIED CHRONICITY: ICD-10-CM

## 2020-12-03 PROCEDURE — 62321 NJX INTERLAMINAR CRV/THRC: CPT | Performed by: ANESTHESIOLOGY

## 2020-12-03 ASSESSMENT — ANXIETY QUESTIONNAIRES
GAD7 TOTAL SCORE: 0
1. FEELING NERVOUS, ANXIOUS, OR ON EDGE: NOT AT ALL
3. WORRYING TOO MUCH ABOUT DIFFERENT THINGS: NOT AT ALL
2. NOT BEING ABLE TO STOP OR CONTROL WORRYING: NOT AT ALL
GAD7 TOTAL SCORE: 0
6. BECOMING EASILY ANNOYED OR IRRITABLE: NOT AT ALL
7. FEELING AFRAID AS IF SOMETHING AWFUL MIGHT HAPPEN: NOT AT ALL
GAD7 TOTAL SCORE: 0
7. FEELING AFRAID AS IF SOMETHING AWFUL MIGHT HAPPEN: NOT AT ALL
5. BEING SO RESTLESS THAT IT IS HARD TO SIT STILL: NOT AT ALL
4. TROUBLE RELAXING: NOT AT ALL

## 2020-12-03 ASSESSMENT — PATIENT HEALTH QUESTIONNAIRE - PHQ9
SUM OF ALL RESPONSES TO PHQ QUESTIONS 1-9: 4
SUM OF ALL RESPONSES TO PHQ QUESTIONS 1-9: 4
10. IF YOU CHECKED OFF ANY PROBLEMS, HOW DIFFICULT HAVE THESE PROBLEMS MADE IT FOR YOU TO DO YOUR WORK, TAKE CARE OF THINGS AT HOME, OR GET ALONG WITH OTHER PEOPLE: NOT DIFFICULT AT ALL

## 2020-12-03 NOTE — NURSING NOTE
Discharge Information    IV Discontiued Time:  NA    Amount of Fluid Infused:  NA    Discharge Criteria = When patient returns to baseline or as per MD order    Consciousness:  Pt is fully awake    Circulation:  BP +/- 20% of pre-procedure level    Respiration:  Patient is able to breathe deeply    O2 Sat:  Patient is able to maintain O2 Sat >92% on room air    Activity:  Moves 4 extremities on command    Ambulation:  Patient is able to stand and walk or stand and pivot into wheelchair    Dressing:  Clean/dry or No Dressing    Notes:   Discharge instructions and AVS given to patient    Patient meets criteria for discharge?  YES    Admitted to PCU?  No    Responsible adult present to accompany patient home?  Yes    Signature/Title:    Miladys Lynn RN Care Coordinator  [facility]

## 2020-12-03 NOTE — PATIENT INSTRUCTIONS
[facility] Procedure Discharge Instructions    Today you saw:   Dr. Iliana Benedict      Your procedure:  Epidural steroid injection   Sacro-iliac joint injection   Facet joint injection  Hip injection  Piriformis injection     Medications used:  Lidocaine (anesthetic)  Triamcinolone  (steroid)  Omnipaque (contrast)    Normal Saline               Be cautious when walking as numbness and/or weakness in the legs may occur up to 6-8 hours after the procedure due to effect of the local anesthetic    Do not drive for 6 hours. The effect of the local anesthetic could slow your reflexes.     Avoid strenuous activity for the first 24 hours. You may resume your regular activities after that.     You may shower, however avoid swimming, tub baths or hot tubs for 24 hours following your procedure    You may have a mild to moderate increase in pain for several days following the injection.      You may use ice packs for 10-15 minutes, 3 to 4 times a day at the injection site for comfort    Do not use heat to painful areas for 6 to 8 hours. This will give the local anesthetic time to wear off and prevent you from accidentally burning your skin.    Unless you have been directed to avoid the use of anti-inflammatory medications (NSAIDS-ibuprofen, Aleve, Motrin), you may use these medications or Tylenol for pain control if needed.     With diabetes, check your blood sugar more frequently than usual as your blood sugar may be higher than normal for 10-14 days following a steroid injection. Contact your doctor who manages your diabetes if your blood sugar is higher than usual    Possible side effects of steroids that you may experience include flushing, elevated blood pressure, increased appetite, mild headaches and restlessness.  All of these symptoms will get better with time.    It may take up to 14 days for the steroid medication to start working although you may feel the effect as early as a few days after the  procedure.     Follow up with your referring provider in 2-3 weeks      If you experience any of the following, call the pain center line during work hours at [phone] or on-call physician after hours at [phone]:  -Fever over 100 degree F  -Swelling, bleeding, redness, drainage, warmth at the injection site  -Progressive weakness or numbness in your legs   -Loss of bowel or bladder function  -Unusual headache that is not relieved by Tylenol or your regular headache medication  -Unusual new onset of pain that is not improving

## 2020-12-04 ASSESSMENT — ANXIETY QUESTIONNAIRES: GAD7 TOTAL SCORE: 0

## 2020-12-07 ENCOUNTER — VIRTUAL VISIT (OUTPATIENT)
Dept: FAMILY MEDICINE | Facility: CLINIC | Age: 25
End: 2020-12-07
Payer: COMMERCIAL

## 2020-12-07 DIAGNOSIS — F32.5 MAJOR DEPRESSION IN COMPLETE REMISSION (H): Primary | ICD-10-CM

## 2020-12-07 PROCEDURE — 99213 OFFICE O/P EST LOW 20 MIN: CPT | Mod: TEL | Performed by: NURSE PRACTITIONER

## 2020-12-07 RX ORDER — ESCITALOPRAM OXALATE 20 MG/1
20 TABLET ORAL DAILY
Qty: 30 TABLET | Refills: 3 | Status: SHIPPED | OUTPATIENT
Start: 2020-12-07 | End: 2022-02-14

## 2020-12-07 ASSESSMENT — PATIENT HEALTH QUESTIONNAIRE - PHQ9: SUM OF ALL RESPONSES TO PHQ QUESTIONS 1-9: 4

## 2020-12-07 NOTE — PROGRESS NOTES
Rayray Contreras is a 25 year old female who is being evaluated via a billable telephone visit.          What phone number would you like to be contacted at? [phone]    How would you like to obtain your AVS? Sarahi Auguste     Rayray Contreras is a 25 year old female who presents via phone visit today for the following health issues:    History of Present Illness       She eats 0-1 servings of fruits and vegetables daily.She consumes 1 sweetened beverage(s) daily.She exercises with enough effort to increase her heart rate 9 or less minutes per day.  She exercises with enough effort to increase her heart rate 3 or less days per week. She is missing 5 dose(s) of medications per week.  She is not taking prescribed medications regularly due to remembering to take.       Depression Followup    How are you doing with your depression since your last visit? Improved Pt states that mood has improved but she does not like the side effects of the medication would like to discuss starting a new medication. Pt reports nausea when taking sertraline.     Are you having other symptoms that might be associated with depression? Yes:  anxiety     Have you had a significant life event?  No     Are you feeling anxious or having panic attacks?   Yes:  mild anxiety     Do you have any concerns with your use of alcohol or other drugs? No    Social History     Tobacco Use     Smoking status: Never Smoker     Smokeless tobacco: Never Used   Substance Use Topics     Alcohol use: No     Alcohol/week: 0.0 standard drinks     Comment: once every few months      Drug use: No     PHQ 10/22/2020 11/22/2020 12/3/2020   PHQ-9 Total Score 5 6 4   Q9: Thoughts of better off dead/self-harm past 2 weeks Not at all Not at all Not at all     KWAKU-7 SCORE 10/23/2020 11/22/2020 12/3/2020   Total Score - - -   Total Score - 0 (minimal anxiety) 0 (minimal anxiety)   Total Score 6 0 0         Suicide Assessment Five-step Evaluation and Treatment  "(SAFE-T)    Answers for HPI/ROS submitted by the patient on 12/3/2020   Chronic problems general questions HPI Form  If you checked off any problems, how difficult have these problems made it for you to do your work, take care of things at home, or get along with other people?: Not difficult at all  PHQ9 TOTAL SCORE: 4  KWAKU 7 TOTAL SCORE: 0  How many servings of fruits and vegetables do you eat daily?: 0-1  On average, how many sweetened beverages do you drink each day (Examples: soda, juice, sweet tea, etc.  Do NOT count diet or artificially sweetened beverages)?: 1   How many minutes a day do you exercise enough to make your heart beat faster?: 9 or less  How many days a week do you exercise enough to make your heart beat faster?: 3 or less  How many days per week do you miss taking your medication?: 5  What makes it hard for you to take your medication every day?: remembering to take        Review of Systems   Constitutional, HEENT, cardiovascular, pulmonary, GI, , musculoskeletal, neuro, skin, endocrine and psych systems are negative, except as otherwise noted.       Objective          Vitals:  No vitals were obtained today due to virtual visit.    healthy, alert and no distress  PSYCH: Alert and oriented times 3; coherent speech, normal   rate and volume, able to articulate logical thoughts, able   to abstract reason, no tangential thoughts, no hallucinations   or delusions  Her affect is normal  RESP: No cough, no audible wheezing, able to talk in full sentences  Remainder of exam unable to be completed due to telephone visits         Assessment/Plan:    Assessment & Plan     Major depression in complete remission (H)  Change to lexapro for hopefully less GI effect. Follow up 4 weeks for med check.  - escitalopram (LEXAPRO) 20 MG tablet  Dispense: 30 tablet; Refill: 3       BMI:   Estimated body mass index is 33.29 kg/m  as calculated from the following:    Height as of 10/23/20: 1.575 m (5' 2\").    Weight " "as of 10/23/20: 82.6 kg (182 lb).            MEDICATIONS:   Orders Placed This Encounter   Medications     escitalopram (LEXAPRO) 20 MG tablet     Sig: Take 1 tablet (20 mg) by mouth daily     Dispense:  30 tablet     Refill:  3       No follow-ups on file.    Milana Roman CNP  [facility]    Phone call duration:  5 minutes          Milana Roman CNP    The patient has been notified of following:     \"This telephone visit will be conducted via a call between you and your physician/provider. We have found that certain health care needs can be provided without the need for a physical exam.  This service lets us provide the care you need with a short phone conversation.  If a prescription is necessary we can send it directly to your pharmacy.  If lab work is needed we can place an order for that and you can then stop by our lab to have the test done at a later time.    Telephone visits are billed at different rates depending on your insurance coverage. During this emergency period, for some insurers they may be billed the same as an in-person visit.  Please reach out to your insurance provider with any questions.    If during the course of the call the physician/provider feels a telephone visit is not appropriate, you will not be charged for this service.\"    Patient has given verbal consent for Telephone visit?  Yes          "

## 2020-12-08 NOTE — TELEPHONE ENCOUNTER
"Requested Prescriptions   Pending Prescriptions Disp Refills     INTROVALE 0.15-0.03 MG tablet [Pharmacy Med Name: INTROVALE TABLETS] 91 tablet 0      Last Written Prescription Date:  03/15/2019  Last Fill Quantity: 91,  # refills: 0   Last office visit: 5/15/2018 with prescribing provider:  NOTE in last RX -- states due to OV for further  refills      Sig: TAKE 1 TABLET BY MOUTH EVERY    Contraceptives Protocol Passed - 3/17/2019  2:39 PM       Passed - Patient is not a current smoker if age is 35 or older       Passed - Recent (12 mo) or future (30 days) visit within the authorizing provider's specialty    Patient had office visit in the last 12 months or has a visit in the next 30 days with authorizing provider or within the authorizing provider's specialty.  See \"Patient Info\" tab in inbasket, or \"Choose Columns\" in Meds & Orders section of the refill encounter.             Passed - Medication is active on med list       Passed - No active pregnancy on record       Passed - No positive pregnancy test in past 12 months          " Angio-Seal utilized for closure of sight.

## 2020-12-09 ENCOUNTER — E-VISIT (OUTPATIENT)
Dept: FAMILY MEDICINE | Facility: CLINIC | Age: 25
End: 2020-12-09
Payer: COMMERCIAL

## 2020-12-09 DIAGNOSIS — R09.81 CONGESTION OF PARANASAL SINUS: Primary | ICD-10-CM

## 2020-12-09 PROCEDURE — 99421 OL DIG E/M SVC 5-10 MIN: CPT | Performed by: PHYSICIAN ASSISTANT

## 2020-12-09 RX ORDER — FLUTICASONE PROPIONATE 50 MCG
2 SPRAY, SUSPENSION (ML) NASAL DAILY
Qty: 16 G | Refills: 0 | Status: SHIPPED | OUTPATIENT
Start: 2020-12-09 | End: 2021-03-08

## 2020-12-09 RX ORDER — GUAIFENESIN, PSEUDOEPHEDRINE HYDROCHLORIDE 600; 60 MG/1; MG/1
1 TABLET, EXTENDED RELEASE ORAL EVERY 12 HOURS
Qty: 20 TABLET | Refills: 0 | Status: SHIPPED | OUTPATIENT
Start: 2020-12-09 | End: 2021-03-08

## 2020-12-09 NOTE — PATIENT INSTRUCTIONS
Viral Upper Respiratory Illness (Adult)    You have a viral upper respiratory illness (URI), which is another term for the common cold. This illness is contagious during the first few days. It is spread through the air by coughing and sneezing. It may also be spread by direct contact (touching the sick person and then touching your own eyes, nose, or mouth). Frequent handwashing will decrease risk of spread. Most viral illnesses go away within 7 to 10 days with rest and simple home remedies. Sometimes the illness may last for several weeks. Antibiotics will not kill a virus, and they are generally not prescribed for this condition.  Home care    If symptoms are severe, rest at home for the first 2 to 3 days. When you resume activity, don't let yourself get too tired.    Don't smoke. If you need help stopping, talk with your healthcare provider.    Avoid being exposed to cigarette smoke (yours or others ).    You may use acetaminophen or ibuprofen to control pain and fever, unless another medicine was prescribed. If you have chronic liver or kidney disease, have ever had a stomach ulcer or gastrointestinal bleeding, or are taking blood-thinning medicines, talk with your healthcare provider before using these medicines. Aspirin should never be given to anyone under 18 years of age who is ill with a viral infection or fever. It may cause severe liver or brain damage.    Your appetite may be poor, so a light diet is fine. Stay well hydrated by drinking 6 to 8 glasses of fluids per day (water, soft drinks, juices, tea, or soup). Extra fluids will help loosen secretions in the nose and lungs.    Over-the-counter cold medicines will not shorten the length of time you re sick, but they may be helpful for the following symptoms: cough, sore throat, and nasal and sinus congestion. If you take prescription medicines, ask your healthcare provider or pharmacist which over-the-counter medicines are safe to use. (Note: Don't  use decongestants if you have high blood pressure.)  Follow-up care  Follow up with your healthcare provider, or as advised.  When to seek medical advice  Call your healthcare provider right away if any of these occur:    Cough with lots of colored sputum (mucus)    Severe headache; face, neck, or ear pain    Difficulty swallowing due to throat pain    Fever of 100.4 F (38 C) or higher, or as directed by your healthcare provider  Call 911  Call 911 if any of these occur:    Chest pain, shortness of breath, wheezing, or difficulty breathing    Coughing up blood    Very severe pain with swallowing, especially if it goes along with a muffled voice   Birdie last reviewed this educational content on 6/1/2018 2000-2020 [employer]. [address] [place] [address], [place] [address]. All rights reserved. This information is not intended as a substitute for professional medical care. Always follow your healthcare professional's instructions.

## 2020-12-17 ENCOUNTER — TELEPHONE (OUTPATIENT)
Facility: CLINIC | Age: 25
End: 2020-12-17

## 2020-12-17 NOTE — TELEPHONE ENCOUNTER
Left message on the patients voice mail asking about her FMLA workability form. Patient has not had a surgery and is not scheduled for surgery. According to Mick's notes, the patient is trying conservative methods. I suggested that PCP might be the best person to fill out FMLA and workability forms at this time. Mick did not have any restrictions in his note.     Sandro Waddell, GEORGI on 12/17/2020 at 4:01 PM

## 2020-12-21 ENCOUNTER — MYC MEDICAL ADVICE (OUTPATIENT)
Dept: FAMILY MEDICINE | Facility: CLINIC | Age: 25
End: 2020-12-21

## 2020-12-21 ENCOUNTER — TELEPHONE (OUTPATIENT)
Facility: CLINIC | Age: 25
End: 2020-12-21

## 2020-12-21 DIAGNOSIS — M54.16 LUMBAR RADICULOPATHY: Primary | ICD-10-CM

## 2020-12-21 NOTE — TELEPHONE ENCOUNTER
Per Pablo Ahuja PA-C: She has had PT and INJ. I  suggest next step would be evaluation by pain management.     Order placed with pain management for evaluation for comprehensive services.     My chart message sent to patient to confirm.

## 2020-12-21 NOTE — TELEPHONE ENCOUNTER
"Patient called clinic requesting to repeat injection.    Type of injection: T6-T7 interlaminar epidural steroid injection    Most recent injection date: 12/3/20    Most recent MRI:  10/28/20    How long did injection provide symptomatic relief: None    Current symptoms: \" I had the same pain before injection.  Pain  Is from upper to neck, to bilateral shoulder blade and down to  middle of back. Rated pain is 9-0 as   sharp and  pinching   Number of injections in last 12 months: 1  Plan:       Patient voiced understanding and agreement with plan.     Advised patient to call back with any questions or concerns.    "

## 2020-12-21 NOTE — TELEPHONE ENCOUNTER
My chart message sent     Please help pt schedule an appointment     Thank you     Delores Garcia RN, BSN  [facility]

## 2021-01-04 ENCOUNTER — OFFICE VISIT (OUTPATIENT)
Dept: FAMILY MEDICINE | Facility: CLINIC | Age: 26
End: 2021-01-04
Payer: COMMERCIAL

## 2021-01-04 VITALS
HEIGHT: 62 IN | OXYGEN SATURATION: 100 % | HEART RATE: 87 BPM | WEIGHT: 186 LBS | TEMPERATURE: 98.7 F | SYSTOLIC BLOOD PRESSURE: 126 MMHG | DIASTOLIC BLOOD PRESSURE: 76 MMHG | BODY MASS INDEX: 34.23 KG/M2

## 2021-01-04 DIAGNOSIS — G89.29 CHRONIC BILATERAL THORACIC BACK PAIN: Primary | ICD-10-CM

## 2021-01-04 DIAGNOSIS — M54.6 CHRONIC BILATERAL THORACIC BACK PAIN: Primary | ICD-10-CM

## 2021-01-04 PROCEDURE — 99213 OFFICE O/P EST LOW 20 MIN: CPT | Mod: 95 | Performed by: NURSE PRACTITIONER

## 2021-01-04 ASSESSMENT — MIFFLIN-ST. JEOR: SCORE: 1531.94

## 2021-01-04 NOTE — PROGRESS NOTES
"  Assessment & Plan     Chronic bilateral thoracic back pain  Has follow up with orthopedics on 1/14/21. Encouraged to continue with them for workup. Can use naproxen and cyclobenzaprine as needed. Discussed starting exercise program and working into it as long as doesn't exacerbate pain. Notes reviewed from previous providers seen in  and orthopedics, neurosurgery.     Review of external notes as documented above     18 minutes spent on the date of the encounter doing chart review, history and exam, documentation and further activities as noted above         BMI:   Estimated body mass index is 34.02 kg/m  as calculated from the following:    Height as of this encounter: 1.575 m (5' 2\").    Weight as of this encounter: 84.4 kg (186 lb).   Weight management plan: Discussed healthy diet and exercise guidelines      Continue with orthopedics, paperwork filled out.     Return in about 1 month (around 2/4/2021) for Follow up.    Milana Roman, [facility]     Rayray Contreras is a 25 year old female who presents to clinic today for the following health issues-continued back pain in thoracic area. History of work injury in March 2020. See previous notes documenting. Eventually saw orthopedics had had therapy and injection without improvement in symptoms. Wanting some work restrictions continued today.    HPI     Forms:   Pt would like to go over work injury restrictions, pt has forms that need to be completed for work.     Patient had injection for back but did not help. Still thoracic back pain between shoulder blade area. Exacerbated by lifting, twisting. NSAID and muscle relaxant used prn and minimally helpful.    Needs note for work today.     Continues to see orthopedics.         Review of Systems   Constitutional, HEENT, cardiovascular, pulmonary, GI, , musculoskeletal, neuro, skin, endocrine and psych systems are negative, except as otherwise noted.      Objective    BP " "126/76   Pulse 87   Temp 98.7  F (37.1  C) (Tympanic)   Ht 1.575 m (5' 2\")   Wt 84.4 kg (186 lb)   SpO2 100%   Breastfeeding No   BMI 34.02 kg/m    Body mass index is 34.02 kg/m .  Physical Exam   GENERAL: healthy, alert and no distress  RESP: lungs clear to auscultation - no rales, rhonchi or wheezes  BREAST: normal without masses, tenderness or nipple discharge and no palpable axillary masses or adenopathy  CV: regular rate and rhythm, normal S1 S2, no S3 or S4, no murmur, click or rub, no peripheral edema and peripheral pulses strong  MS: no gross musculoskeletal defects noted, back pain between shoulder blades, Tight to palpation. Pain over thoracic spinous processes.   NEURO: Normal strength and tone, mentation intact and speech normal  PSYCH: mentation appears normal, affect normal/bright                          RAUL Astudillo     [address] [address]  [email or web address]   Office: [phone]       "

## 2021-01-05 ENCOUNTER — TELEPHONE (OUTPATIENT)
Dept: FAMILY MEDICINE | Facility: CLINIC | Age: 26
End: 2021-01-05

## 2021-01-05 NOTE — TELEPHONE ENCOUNTER
Completed form faxed to [facility]. Copy to abstract.    Gino MORLEY

## 2021-01-14 ENCOUNTER — TELEPHONE (OUTPATIENT)
Dept: PALLIATIVE MEDICINE | Facility: CLINIC | Age: 26
End: 2021-01-14

## 2021-01-14 ENCOUNTER — VIRTUAL VISIT (OUTPATIENT)
Dept: NEUROSURGERY | Facility: CLINIC | Age: 26
End: 2021-01-14
Attending: PHYSICIAN ASSISTANT
Payer: COMMERCIAL

## 2021-01-14 VITALS — BODY MASS INDEX: 34.23 KG/M2 | WEIGHT: 186 LBS | HEIGHT: 62 IN

## 2021-01-14 DIAGNOSIS — M54.6 BILATERAL THORACIC BACK PAIN, UNSPECIFIED CHRONICITY: Primary | ICD-10-CM

## 2021-01-14 PROCEDURE — 99203 OFFICE O/P NEW LOW 30 MIN: CPT | Mod: 95 | Performed by: PHYSICIAN ASSISTANT

## 2021-01-14 ASSESSMENT — PAIN SCALES - GENERAL: PAINLEVEL: EXTREME PAIN (9)

## 2021-01-14 ASSESSMENT — MIFFLIN-ST. JEOR: SCORE: 1531.94

## 2021-01-14 NOTE — LETTER
"    1/14/2021         RE: Rayray Contreras  [address] [place] [address]        Dear Colleague,    Thank you for referring your patient, Rayray Contreras, to the [facility] [place]. Please see a copy of my visit note below.    Rayray Contreras is a 25 year old female who is being evaluated via a billable telephone visit.      Due to COVID-19 this visit has been performed over the phone verses face to face.     The patient has been notified of following:     \"This telephone visit will be conducted via a call between you and your physician/provider. We have found that certain health care needs can be provided without the need for a physical exam.  This service lets us provide the care you need with a short phone conversation.  If a prescription is necessary we can send it directly to your pharmacy.  If lab work is needed we can place an order for that and you can then stop by our lab to have the test done at a later time.    If during the course of the call the physician/provider feels a telephone visit is not appropriate, you will not be charged for this service.\"     Rayray Contreras complains of    Chief Complaint   Patient presents with     Consult     Bilateral upper back pain / bulging disc     phone visit     [phone]       I have reviewed and updated the patient's Past Medical History, Social History, Family History and Medication List.    ALLERGIES  Ibuprofen and Chicken-derived products (egg)    Additional provider notes:    Rayray Contreras is a 25 year old female who was previously evaluated by neurosurgery for back pain she underwent a thoracic MRI which did not show any high-grade central or foraminal stenosis and no abnormal cord signal no significant degenerative findings of the very small distributions.  She underwent a thoracic epidural steroid injection with no improvement.  She is tried physical therapy in the past without much " improvement.  She was referred to pain management and has received a call from them when he is to call back to further make an appointment with them and this is what what she would like to try next.  This is a workers comp injury where she lifted a vacuum  at her job as a  many months ago.  She has continued to work throughout this period of time.  She states her symptoms are worse when she is working and improved when she is resting at home.  She denies any significant radicular symptoms into her legs denies any loss of dexterity in her lower extremities.    Assessment    Thoracic, scapular, shoulder pain    Plan:    I would recommend the patient proceed with the pain management referral.  She can certainly consider chiropractic and acupuncture as well as other conservative options.  She can follow-up with us as needed I do not anticipate any type of surgery is needed in this patient's care.    Phone call duration: 7 minutes  Start Time 310  End Time 317    Isreal Levin PA-C    Patient provided verbal consent for the phone visit today.       Again, thank you for allowing me to participate in the care of your patient.        Sincerely,        Isreal Levin PA-C

## 2021-01-14 NOTE — TELEPHONE ENCOUNTER
Pain Management Center Referral      1. Confirmed address with patient? Yes  2. Confirmed phone number with patient? Yes  3. Confirmed referring provider? Yes  4. Is the PCP the same as the referring provider? Yes  5. Has the patient been to any previous pain clinics? Yes  (If yes, send JESSY with welcome letter)  6. Which insurance are we to bill for this appointment?     Nayana Minor  [phone]      7. Informed pt of cancellation (48 hour) policy? Yes    REGARDING OPIOID MEDICATIONS: We will always address appropriateness of opioid pain medications, but we generally will not automatically take on a prescribing role. When we do take on prescribing of opioids for chronic pain, it is in collaboration with the referring physician for an intermediate period of time (months), with an expectation that the primary physician or provider will assume the prescribing role if medications are effective at stable doses with demonstrated compliance. Therefore, please do not assume that your prescribing responsibilities end on the day of pain clinic consultation.  8. Informed pt of prescribing policy? Yes    9.Please be aware that once you are established with a pain provider and location, you will need to continue have all future visits with that provider and location. It is best to determine what location is the most convenient for you and schedule with that one.    ** PATIENT INFORMED OF THIS POLICY Yes      9. Referring Provider: Pablo Ahuja PA-C    10. Criteria for Triage Eval:   -Missed/Failed 1st appointment? N/A     -Medication Focused? N/A     -Mental Health Concerns? (e.g. Recent psych hospitalization/snap shot)? N/A     -Active substance abuse? N/A     -Patient behaviors (e.g. Offensive language/raised voice)? N/A    Aurora JUNG    [facility]

## 2021-01-14 NOTE — PROGRESS NOTES
"Rayray Contreras is a 25 year old female who is being evaluated via a billable telephone visit.      Due to COVID-19 this visit has been performed over the phone verses face to face.     The patient has been notified of following:     \"This telephone visit will be conducted via a call between you and your physician/provider. We have found that certain health care needs can be provided without the need for a physical exam.  This service lets us provide the care you need with a short phone conversation.  If a prescription is necessary we can send it directly to your pharmacy.  If lab work is needed we can place an order for that and you can then stop by our lab to have the test done at a later time.    If during the course of the call the physician/provider feels a telephone visit is not appropriate, you will not be charged for this service.\"     Rayray Contreras complains of    Chief Complaint   Patient presents with     Consult     Bilateral upper back pain / bulging disc     phone visit     [phone]       I have reviewed and updated the patient's Past Medical History, Social History, Family History and Medication List.    ALLERGIES  Ibuprofen and Chicken-derived products (egg)    Additional provider notes:    Rayray Contreras is a 25 year old female who was previously evaluated by neurosurgery for back pain she underwent a thoracic MRI which did not show any high-grade central or foraminal stenosis and no abnormal cord signal no significant degenerative findings of the very small distributions.  She underwent a thoracic epidural steroid injection with no improvement.  She is tried physical therapy in the past without much improvement.  She was referred to pain management and has received a call from them when he is to call back to further make an appointment with them and this is what what she would like to try next.  This is a workers comp injury where she lifted a vacuum  at her job as a " [employer] many months ago.  She has continued to work throughout this period of time.  She states her symptoms are worse when she is working and improved when she is resting at home.  She denies any significant radicular symptoms into her legs denies any loss of dexterity in her lower extremities.    Assessment    Thoracic, scapular, shoulder pain    Plan:    I would recommend the patient proceed with the pain management referral.  She can certainly consider chiropractic and acupuncture as well as other conservative options.  She can follow-up with us as needed I do not anticipate any type of surgery is needed in this patient's care.    Phone call duration: 7 minutes  Start Time 310  End Time 317    Isreal Levin PA-C    Patient provided verbal consent for the phone visit today.

## 2021-01-15 ENCOUNTER — HEALTH MAINTENANCE LETTER (OUTPATIENT)
Age: 26
End: 2021-01-15

## 2021-01-18 NOTE — TELEPHONE ENCOUNTER
Incoming fax from , New evaluation has been approved.      Okay to schedule        Cait BORDEN    [facility]

## 2021-01-18 NOTE — TELEPHONE ENCOUNTER
Faxed completed PA form and supporting documentation for NEW EVAL to [facility].  Right fax confirmation          Cait BORDEN    [facility]

## 2021-01-26 ENCOUNTER — OFFICE VISIT (OUTPATIENT)
Dept: FAMILY MEDICINE | Facility: CLINIC | Age: 26
End: 2021-01-26
Payer: OTHER MISCELLANEOUS

## 2021-01-26 VITALS
HEART RATE: 102 BPM | OXYGEN SATURATION: 98 % | HEIGHT: 62 IN | SYSTOLIC BLOOD PRESSURE: 120 MMHG | TEMPERATURE: 98.2 F | DIASTOLIC BLOOD PRESSURE: 76 MMHG | WEIGHT: 186 LBS | BODY MASS INDEX: 34.23 KG/M2

## 2021-01-26 DIAGNOSIS — G89.29 CHRONIC BILATERAL THORACIC BACK PAIN: Primary | ICD-10-CM

## 2021-01-26 DIAGNOSIS — M54.6 CHRONIC BILATERAL THORACIC BACK PAIN: Primary | ICD-10-CM

## 2021-01-26 PROCEDURE — 99213 OFFICE O/P EST LOW 20 MIN: CPT | Performed by: NURSE PRACTITIONER

## 2021-01-26 ASSESSMENT — MIFFLIN-ST. JEOR: SCORE: 1531.94

## 2021-01-26 NOTE — PROGRESS NOTES
"  Assessment & Plan   Problem List Items Addressed This Visit     None      Visit Diagnoses     Chronic bilateral thoracic back pain    -  Primary         Pain clinic referral with upcoming appointment.  Restrictions continued.    12 minutes spent on the date of the encounter doing chart review, history and exam, documentation and further activities as noted above           Work on weight loss  Regular exercise  There are no Patient Instructions on file for this visit.    No follow-ups on file.    Milana Roman CNP  [facility] PRIOR KYLER Seo is a 25 year old who presents to clinic today for the following health issues     HPI     Forms:  Workability form     Chronic bilateral thoracic back pain  Patient had follow-up with neurosurgery and orthopedics..  They recommended pain clinic and she has upcoming appointment this week.  Continues to use muscle relaxants and ibuprofen needed.  Pain is essentially unchanged.  Restrictions at work are helpful and she would like to continue them.    Prior visit note from 1/4/20  Patient had injection for back but did not help. Still thoracic back pain between shoulder blade area. Exacerbated by lifting, twisting. NSAID and muscle relaxant used prn and minimally helpful.    Review of Systems   Constitutional, HEENT, cardiovascular, pulmonary, GI, , musculoskeletal, neuro, skin, endocrine and psych systems are negative, except as otherwise noted.      Objective    /76   Pulse 102   Temp 98.2  F (36.8  C) (Tympanic)   Ht 1.575 m (5' 2\")   Wt 84.4 kg (186 lb)   SpO2 98%   Breastfeeding No   BMI 34.02 kg/m    Body mass index is 34.02 kg/m .  Physical Exam   Physical Exam   GENERAL: healthy, alert and no distress  RESP: lungs clear to auscultation - no rales, rhonchi or wheezes  BREAST: normal without masses, tenderness or nipple discharge and no palpable axillary masses or adenopathy  CV: regular rate and rhythm, normal S1 S2, no S3 or " S4, no murmur, click or rub, no peripheral edema and peripheral pulses strong  MS: no gross musculoskeletal defects noted, back pain between shoulder blades, Tight to palpation. Pain over thoracic spinous processes.   NEURO: Normal strength and tone, mentation intact and speech normal  PSYCH: mentation appears normal, affect normal/bright                             RAUL Astudillo     [address] [address]  [email or web address]   Office: [phone]

## 2021-01-27 ENCOUNTER — TELEPHONE (OUTPATIENT)
Dept: FAMILY MEDICINE | Facility: CLINIC | Age: 26
End: 2021-01-27

## 2021-01-27 NOTE — TELEPHONE ENCOUNTER
Form [facility], faxed to [facility] and sent to scanning.  Jillian Trammell

## 2021-02-02 ENCOUNTER — VIRTUAL VISIT (OUTPATIENT)
Dept: PALLIATIVE MEDICINE | Facility: CLINIC | Age: 26
End: 2021-02-02
Payer: OTHER MISCELLANEOUS

## 2021-02-02 DIAGNOSIS — M54.6 BILATERAL THORACIC BACK PAIN, UNSPECIFIED CHRONICITY: Primary | ICD-10-CM

## 2021-02-02 DIAGNOSIS — M54.16 LUMBAR RADICULOPATHY: ICD-10-CM

## 2021-02-02 DIAGNOSIS — M79.10 TRIGGER POINT: ICD-10-CM

## 2021-02-02 PROCEDURE — 99214 OFFICE O/P EST MOD 30 MIN: CPT | Mod: 95 | Performed by: PHYSICAL MEDICINE & REHABILITATION

## 2021-02-02 RX ORDER — METHOCARBAMOL 500 MG/1
500 TABLET, FILM COATED ORAL 4 TIMES DAILY PRN
Qty: 120 TABLET | Refills: 0 | Status: SHIPPED | OUTPATIENT
Start: 2021-02-02 | End: 2021-04-12

## 2021-02-02 ASSESSMENT — PAIN SCALES - GENERAL: PAINLEVEL: EXTREME PAIN (8)

## 2021-02-02 NOTE — Clinical Note
Thanks for the referral. Will try TPI for upper back trigger points and further medical management if this isn't helpful.    Raul Nguyen, DO  [facility]

## 2021-02-02 NOTE — PATIENT INSTRUCTIONS
1. Stop flexeril    2. Start methocarbamol 500mg twice daily as needed. This is a muscle relaxant so it can make you drowsy. Try it initially when you are not expecting to drive or operate machinery. You can increase the dose to 750 if there are no side effects with the 500mg dose but no improvement in pain.    3. You can take tylenol 650mg four times daily as needed.    4. You can take ibuprofen 400mg four times daily as needed.    5. I ordered trigger point injections, you'll be called to schedule this appointment.    Take care,    Raul Nguyen,   [facility]        ----------------------------------------------------------------  Clinic Number:  [phone]     Call with any questions about your care and for scheduling assistance.     Calls are returned Monday through Friday between 8 AM and 4:30 PM. We usually get back to you within 2 business days depending on the issue/request.    If we are prescribing your medications:    For opioid medication refills, call the clinic or send a [employer] message 7 days in advance.  Please include:    Name of requested medication    Name of the pharmacy.    For non-opioid medications, call your pharmacy directly to request a refill. Please allow 3-4 days to be processed.     Per [place] State Law:    All controlled substance prescriptions must be filled within 30 days of being written.      For those controlled substances allowing refills, pickup must occur within 30 days of last fill.      We believe regular attendance is key to your success in our program!      Any time you are unable to keep your appointment we ask that you call us at least 24 hours in advance to cancel.This will allow us to offer the appointment time to another patient.     Multiple missed appointments may lead to dismissal from the clinic.

## 2021-02-02 NOTE — PROGRESS NOTES
Rayray is a 25 year old who is being evaluated via a billable video visit.      How would you like to obtain your AVS? MyChart  If the video visit is dropped, the invitation should be resent by: Other e-mail: Sarahi  Will anyone else be joining your video visit? Alexia Huff MA  [facility]        Video Start Time: 245pm  Video-Visit Details    Type of service:  Video Visit    Video End Time:3:02 PM    Originating Location (pt. Location): Home    Distant Location (provider location):  [facility] [place]     Platform used for Video Visit: AA Party                        [facility] Consultation    Date of visit: 2021    Assessment:  Rayray Contreras is a 25 year old with past medical history including: PCOS, Obesity, Depression who presents for evaluation and treatment of the followin. Chronic upper back pain: Patient reports chronic upper back pain since a work related injury (pushing a ) in 2020. She has tried physical therapy and a thoracic kit without any relief. Visit was completed virtually today and based on her history the differential includes: Myofascial pain/trigger point of the rhomboid and periscapular muscles, thoracic spondylosis/facet arthropathy, thoracic DDD, scapular dysfunction.       Plan:  The following recommendations were given to the patient. Diagnosis, treatment options, risks, benefits, and alternatives were discussed, and all questions were answered. The patient expressed understanding of the plan for management.     I am recommending a multidisciplinary treatment plan to help this patient better manage her pain.  This includes:     1. Physical Therapy: continue current regimen, will refer to PT based on response to TPI.   2. Clinical Health Pain Psychologist: coping well, will discuss at clinic visit.   1. Therapy can help reduce physical and psychosocial  triggers or reinforcers of pain by adapting thoughts, feelings and behaviors to reduce symptoms and increase quality of life.  Evidence indicates that the practice of relaxation, meditation, and mindfulness techniques can significantly affect pain levels and overall well being.  3. Self Care Recommendations: Gentle progressive exercise that does not increase pain - gradually increase daily walking program.  Take mini breaks - 5 minutes of mindfullness a couple times a day.   4. Diagnostic Studies: none, reviewed thoracic MRI findings with patient.  5. Medication Management:   1. Stop flexeril  2. Start methocarbamol 500mg qid prn  3. Continue tylenol 650mg qid prn  4. Continue ibuprofen 400mg qid prn  6. Further procedures recommended: trigger point injections ordered.  7. Follow up: for TPI in clinic      DO [facility]            Reason for consultation:    Rayray Contreras is a 25 year old female who is seen in consultation today at the request of Pablo Ahuja PA-C.     Consultation and Evaluation for: chronic upper back pain    Review of [place] Prescription Monitoring Program (): Today I have also reviewed the patient's history of controlled substance use, as provided by [place] licensed pharmacies and prescriber dispensers.     Review of Pain Questionnaire: Please see the [facility] health questionnaire, which the patient completed and reviewed with me in detail.    Review of Electronic Chart: Today I have also reviewed available medical information in the patient's medical record at [place] ([facility] including relevant provider notes, laboratory work, and imaging.     Rayray Contreras has been seen at a pain clinic in the past.      Chief Complaint:    Chief Complaint   Patient presents with     Pain     Video visit due to covid-19       Pain history:  Rayray Contreras is a 25 year old female who presents for initial evaluation of chief pain  complaint of chronic upper back pain.     Her pain the day after she was pushing a  which had ran out of charge. The next morning as she was getting out of bed she was having sharp pains in her upper back. She did go to work the next day and she was told to be evaluated by the doctors before returning to work. Currently she is on work restrictions. She was seen by her PCP initially who referred her to PT. She didn't have much improvement with PT and was seen by neurosurgery who ordered and MRI. There was a small disc herniation and she had a thoracic kti which did not make much of a difference.    She is taking flexeril at night due to drowsiness and this does seem to help. She has tried yoga with her sister but this didn't help much.    Onset: 3/26/20  Location/Radiation: upper back  Quality: sharp, miserable, tiring, exhausting  Severity/Intensity: 9/10 on average  Aggravating factors include: activity, lifting  Relieving factors include: lying down, muscle relaxer  Red Flags: The patient denies bowel or bladder incontinence, parasthesias, weakness, saddle anesthesia, unintentional weight loss, or fever/chills/sweats.         Pain Treatments:  1. Medications:       Current pain medications:  -Flexeril 5mg prn         Previous pain medications:  -Naproxen 550mg BID prn helped a little    2. Physical Therapy: didn't help     TENS unit: didn't try  3. Pain psychology: hasn't tried  4. Surgery: none  5. Injections: thoracic kit 12/3/20 - [facility]  6. Alternative Therapies:    Chiropractic: hasn't tried   Acupuncture: hasn't tried      Diagnostic tests:  MRI of thoracic spine was completed on 10/28/20 was reviewed with the patient and shows:       Mild to moderate degenerative disc height loss at T6-T7 and T8-T9. The  other intervertebral discs appear relatively maintained in height. At  T6-T7, there is a right central disc herniation which mildly indents  the right ventral aspect of the cord without cord  signal change. No  other significant disc herniation identified. No significant spinal  canal stenosis. No significant neural foraminal stenosis. There appear  to be trace layering bilateral pleural effusions. The paravertebral  soft tissues appear within normal limits.                                                                      IMPRESSION:  1. Mild multilevel degenerative disease, as described. No high-grade  spinal canal or neural foraminal stenosis.  2. Trace layering bilateral pleural effusions.     CHEYANNE GILL MD    Labs:   Lab Results   Component Value Date    WBC 11.4 03/14/2018     Lab Results   Component Value Date    RBC 4.70 03/14/2018     Lab Results   Component Value Date    HGB 15.1 03/14/2018     Lab Results   Component Value Date    HCT 45.0 03/14/2018     Lab Results   Component Value Date    MCV 96 03/14/2018     Lab Results   Component Value Date    MCH 32.1 03/14/2018     Lab Results   Component Value Date    MCHC 33.6 03/14/2018     Lab Results   Component Value Date    RDW 12.1 03/14/2018     Lab Results   Component Value Date     03/14/2018     Last Comprehensive Metabolic Panel:  Sodium   Date Value Ref Range Status   02/06/2018 141 133 - 144 mmol/L Final     Potassium   Date Value Ref Range Status   02/06/2018 4.2 3.4 - 5.3 mmol/L Final     Chloride   Date Value Ref Range Status   02/06/2018 108 94 - 109 mmol/L Final     Carbon Dioxide   Date Value Ref Range Status   02/06/2018 24 20 - 32 mmol/L Final     Anion Gap   Date Value Ref Range Status   02/06/2018 9 3 - 14 mmol/L Final     Glucose   Date Value Ref Range Status   09/16/2019 72 70 - 99 mg/dL Final     Comment:     Fasting specimen     Urea Nitrogen   Date Value Ref Range Status   02/06/2018 9 7 - 30 mg/dL Final     Creatinine   Date Value Ref Range Status   02/06/2018 0.64 0.52 - 1.04 mg/dL Final     GFR Estimate   Date Value Ref Range Status   02/06/2018 >90 >60 mL/min/1.7m2 Final     Comment:     Non   American GFR Calc     Calcium   Date Value Ref Range Status   02/06/2018 9.4 8.5 - 10.1 mg/dL Final     Bilirubin Total   Date Value Ref Range Status   02/06/2018 0.4 0.2 - 1.3 mg/dL Final     Alkaline Phosphatase   Date Value Ref Range Status   02/06/2018 35 (L) 40 - 150 U/L Final     ALT   Date Value Ref Range Status   02/06/2018 93 (H) 0 - 50 U/L Final     AST   Date Value Ref Range Status   02/06/2018 42 0 - 45 U/L Final                 Past Medical History:  Past Medical History:   Diagnosis Date     Concussion without loss of consciousness 9/1/2015     Developmental delay      Major depressive disorder, single episode, mild (H) 7/15/2010     Muscle cramp 5/14/2009    Has botox injection in gastrocnemius.     PCOS (polycystic ovarian syndrome) 7/15/2010       Past Surgical History:  Past Surgical History:   Procedure Laterality Date     NO HISTORY OF SURGERY         Medications:  Current Outpatient Medications   Medication Sig Dispense Refill     cyclobenzaprine (FLEXERIL) 10 MG tablet 1/2 tab during the day and 1 tab at bedtime as needed for muscle spasms 20 tablet 0     escitalopram (LEXAPRO) 20 MG tablet Take 1 tablet (20 mg) by mouth daily 30 tablet 3     levonorgestrel-ethinyl estradiol (SEASONALE) 0.15-0.03 MG tablet TAKE 1 TABLET BY MOUTH DAILY 91 tablet 3     methocarbamol (ROBAXIN) 500 MG tablet Take 1 tablet (500 mg) by mouth 4 times daily as needed for muscle spasms 120 tablet 0     naproxen sodium (ANAPROX) 550 MG tablet Take 1 tablet (550 mg) by mouth 2 times daily (with meals) 60 tablet 1     pseudoePHEDrine-guaiFENesin (MUCINEX D)  MG 12 hr tablet Take 1 tablet by mouth every 12 hours 20 tablet 0     fluticasone (FLONASE) 50 MCG/ACT nasal spray Spray 2 sprays in nostril daily (Patient not taking: Reported on 1/14/2021) 16 g 0       Allergies:     Allergies   Allergen Reactions     Ibuprofen Other (See Comments)     Bleeding?  Per chart negative for Von Willebrand's x 3     Chicken-Derived  Products (Egg)      Other reaction(s): GI Upset       Social History:  Home situation: Lives in [place]  Occupation/Schooling:   Tobacco use: denies  Drug use: denies  Alcohol use: occasional  History of chemical dependency treatment: denies  Mental health admissions: denies    Family history:  Family History   Problem Relation Age of Onset     Arthritis Mother      Diabetes Maternal Grandmother      Colon Cancer Maternal Grandmother         passed from - around 80 years old     Arthritis Maternal Grandmother      Cerebrovascular Disease Maternal Grandmother      Coronary Artery Disease Maternal Grandmother      Diabetes Maternal Grandfather      Cerebrovascular Disease Maternal Grandfather      Coronary Artery Disease Maternal Grandfather      Cerebrovascular Disease Paternal Grandmother      Diabetes Paternal Grandmother      Coronary Artery Disease Paternal Grandmother      Cerebrovascular Disease Paternal Grandfather      Diabetes Paternal Grandfather      Coronary Artery Disease Paternal Grandfather      Breast Cancer No family hx of      Thyroid Disease No family hx of        Review of Systems:    POSTIVE IN BOLD  GENERAL: fever/chills, fatigue, general unwell feeling, weight gain/loss.  HEAD/EYES:  headache, dizziness, or vision changes.    EARS/NOSE/THROAT:  Nosebleeds, hearing loss, sinus infection, earache, tinnitus.  IMMUNE:  Allergies, cancer, immune deficiency, or infections.  SKIN:  Urticaria, rash, hives  HEME/Lymphatic:   anemia, easy bruising, easy bleeding.  RESPIRATORY:  cough, wheezing, or shortness of breath  CARDIOVASCULAR/Circulation:  Extremity edema, syncope, hypertension, tachycardia, or angina.  GASTROINTESTINAL:  abdominal pain, nausea/emesis, diarrhea, constipation,  hematochezia, or melena.  ENDOCRINE:  Diabetes, steroid use,  thyroid disease or osteoporosis.  MUSCULOSKELETAL: neck pain, back pain, arthralgia, arthritis, or gout.  GENITOURINARY:  frequency, urgency,  dysuria, difficulty voiding, hematuria or incontinence.  NEUROLOGIC:  weakness, numbness, paresthesias, seizure, tremor, stroke or memory loss.  PSYCHIATRIC:  depression, anxiety, stress, suicidal thoughts or mood swings.       BILLING TIME DOCUMENTATION:   The total TIME spent on this patient on the date of the encounter/appointment was 34 minutes.      TOTAL TIME includes:   Time spent preparing to see the patient (reviewing records and tests)   Time spent face to face (or over the phone) with the patient   Time spent ordering tests, medications, procedures and referrals     Time spent documenting clinical information in Epic         Raul Nguyen [facility]

## 2021-03-04 ENCOUNTER — OFFICE VISIT (OUTPATIENT)
Dept: PALLIATIVE MEDICINE | Facility: CLINIC | Age: 26
End: 2021-03-04
Attending: PHYSICAL MEDICINE & REHABILITATION
Payer: OTHER MISCELLANEOUS

## 2021-03-04 VITALS — OXYGEN SATURATION: 100 % | HEART RATE: 78 BPM | DIASTOLIC BLOOD PRESSURE: 86 MMHG | SYSTOLIC BLOOD PRESSURE: 138 MMHG

## 2021-03-04 DIAGNOSIS — M79.10 TRIGGER POINT: Primary | ICD-10-CM

## 2021-03-04 DIAGNOSIS — M54.6 BILATERAL THORACIC BACK PAIN, UNSPECIFIED CHRONICITY: ICD-10-CM

## 2021-03-04 PROCEDURE — 20552 NJX 1/MLT TRIGGER POINT 1/2: CPT | Performed by: PHYSICAL MEDICINE & REHABILITATION

## 2021-03-04 ASSESSMENT — PAIN SCALES - GENERAL: PAINLEVEL: EXTREME PAIN (8)

## 2021-03-04 NOTE — PATIENT INSTRUCTIONS
[facility]   Post Procedure Instructions    Today you had:  trigger point injections        Medications used:  bupivicaine   kenolog           Go to the emergency room if you develop any shortness of breath    Monitor the injection sites for signs and symptoms of infection-fever, chills, redness, swelling, warmth, or drainage to areas.    You may have soreness at injection sites for up to 24 hours.    You may apply ice to the painful areas to help minimize the discomfort of the needle pokes.    Do not apply heat to sites for at least 12 hours.    You may use anti-inflammatory medications or Tylenol for pain control if necessary    Pain Clinic phone number during work hours Monday-Friday:  [phone]    After hours provider line: [phone]

## 2021-03-04 NOTE — PROGRESS NOTES
[facility] - Procedure Note    Date of Visit: 3/4/2021    Pre procedure Diagnosis: myofascial pain/myositis 60.9   Post procedure Diagnosis: Same  Procedure performed: trigger point injections  Anesthesia: none  Complications: none  Operators: Raul Nguyen DO    Indications:   Rayray Contreras is a 25 year old female with a history of chronic upper back pain.  Exam shows myofascial pain of the periscapular region and they have tried conservative treatment including oral medications and exercises. She had a thoracic epidural without any relief.    Options/alternatives, benefits and risks were discussed with the patient including bleeding, infection, tissue trauma and pnuemothorax.  Questions were answered to her satisfaction and she agrees to proceed. Voluntary informed consent was obtained and signed.     Vitals were reviewed: Yes  Allergies were reviewed:  Yes   Medications were reviewed:  Yes   Pre-procedure pain score: 5/10    Procedure:  After getting informed consent, a Pause for the Cause was performed.    Trigger points were identified by patient, and marked when appropriate.  The area was prepped with Chloroprep.    Using clean technique, injections were completed using a 25G, 1.5 inch needle.  After negative aspiration, injection was completed.  A total of 4 locations were injected.  When possible, tissue was retracted from the chest wall to avoid lung injury.    Muscle groups injected:  -Bilateral levator scapulae  -Bilateral rhomboid    Injection solution contained:  40mg of kenalog 3ml of 0.5% bupivacaine.    Hemostasis was achieved, the area was cleaned, and bandaids were placed when appropriate.  The patient tolerated the procedure well.  Breath sounds were normal.        Pre-procedure pain score: 5/10  Post-procedure pain score: 5/10     Assessment/Plan: Rayray Contreras is a 25 year old female s/p thoracic trigger point injections for chronic myofascial  pain.    1. Following today's procedure, the patient was advised to contact the [facility] for any of the following:   Fever, chills, or night sweats   New onset of pain, numbness, or weakness   Any questions/concerns regarding the procedure  If unable to contact the Pain Center, the patient was instructed to go to a local Emergency Room for any complications.   2. The patient will receive a follow-up call in 1 week.  3. The patient should follow-up with the referring provider in 2 weeks for post-procedure evaluation.        Raul Nguyen DO  [facility] [place]

## 2021-03-08 ENCOUNTER — OFFICE VISIT (OUTPATIENT)
Dept: FAMILY MEDICINE | Facility: CLINIC | Age: 26
End: 2021-03-08
Payer: OTHER MISCELLANEOUS

## 2021-03-08 VITALS
SYSTOLIC BLOOD PRESSURE: 120 MMHG | DIASTOLIC BLOOD PRESSURE: 80 MMHG | OXYGEN SATURATION: 99 % | TEMPERATURE: 99.3 F | BODY MASS INDEX: 34.6 KG/M2 | HEART RATE: 78 BPM | HEIGHT: 62 IN | WEIGHT: 188 LBS

## 2021-03-08 DIAGNOSIS — M54.6 CHRONIC BILATERAL THORACIC BACK PAIN: Primary | ICD-10-CM

## 2021-03-08 DIAGNOSIS — G89.29 CHRONIC BILATERAL THORACIC BACK PAIN: Primary | ICD-10-CM

## 2021-03-08 PROCEDURE — 99214 OFFICE O/P EST MOD 30 MIN: CPT | Performed by: NURSE PRACTITIONER

## 2021-03-08 ASSESSMENT — MIFFLIN-ST. JEOR: SCORE: 1541.01

## 2021-03-08 NOTE — PATIENT INSTRUCTIONS
Post procedure appointment in 2 weeks. Dr. Nguyen.    Follow up in 8 weeks Dr. Nguyen.     Ask about PT at 1 week follow up call.     Talk to PT about working into exercise program.

## 2021-03-08 NOTE — PROGRESS NOTES
"    Assessment & Plan     Chronic bilateral thoracic back pain  See restrictions.        31 minutes spent on the date of the encounter doing chart review, history and exam, documentation and further activities as noted above     FUTURE APPOINTMENTS:       - Follow-up visit in 1 month    Return in about 2 weeks (around 3/22/2021).    Milana Roman, CNP  [facility] KYLER Seo is a 25 year old who presents for the following health issues  accompanied by her :      Here today with Marion Mena -     HPI     Pressure point injections on upper back, noticing a bit of improvement in these areas. Still has midline thoracic pain.     Doing ok at work and managing current restrictions well. Wants to avoid carpet cleaning since that let to initial injury.    Methocarbamol - relaxes so much has to have a BM multiple times .  Prefers Cyclobenzaprine     Needs updated workability form     Chronic/Recurring Back Pain Follow Up -  03/2020      Where is your back pain located? (Select all that apply) low back upper back, mid back    How would you describe your back pain?  stabbing and pressure    Where does your back pain spread? Around sides thoracic    Since your last clinic visit for back pain, how has your pain changed? gradually improving    Does your back pain interfere with your job? YES    Since your last visit, have you tried any new treatment? Yes -  pressure point injections - shoulder blades area       Pain is still present. But improved.     Follow up in 2 week and 8 weeks. Pt planned.     Review of Systems   Constitutional, HEENT, cardiovascular, pulmonary, GI, , musculoskeletal, neuro, skin, endocrine and psych systems are negative, except as otherwise noted.      Objective    /80 (BP Location: Right arm, Patient Position: Chair, Cuff Size: Adult Large)   Pulse 78   Temp 99.3  F (37.4  C) (Tympanic)   Ht 1.575 m (5' 2\")   Wt " 85.3 kg (188 lb)   LMP 02/26/2021 (Exact Date)   SpO2 99%   Breastfeeding No   BMI 34.39 kg/m    Body mass index is 34.39 kg/m .  Physical Exam   GENERAL: healthy, alert and no distress  MS: tenderness to palpation paraspinal cervical and thoracic and spine exam shows ROM is normal and pain with motion. No SI joint tenderness. Pain with reaching overhead.  NEURO: Normal strength and tone, mentation intact and speech normal

## 2021-03-24 ENCOUNTER — MYC MEDICAL ADVICE (OUTPATIENT)
Dept: FAMILY MEDICINE | Facility: CLINIC | Age: 26
End: 2021-03-24

## 2021-03-24 NOTE — TELEPHONE ENCOUNTER
My chart message sent     Delores Garcia RN, BSN  [facility]

## 2021-04-06 ENCOUNTER — VIRTUAL VISIT (OUTPATIENT)
Dept: PALLIATIVE MEDICINE | Facility: CLINIC | Age: 26
End: 2021-04-06
Payer: OTHER MISCELLANEOUS

## 2021-04-06 DIAGNOSIS — M54.6 BILATERAL THORACIC BACK PAIN, UNSPECIFIED CHRONICITY: Primary | ICD-10-CM

## 2021-04-06 DIAGNOSIS — G89.29 CHRONIC MYOFASCIAL PAIN: ICD-10-CM

## 2021-04-06 DIAGNOSIS — M79.18 CHRONIC MYOFASCIAL PAIN: ICD-10-CM

## 2021-04-06 PROCEDURE — 99214 OFFICE O/P EST MOD 30 MIN: CPT | Mod: 95 | Performed by: PHYSICAL MEDICINE & REHABILITATION

## 2021-04-06 RX ORDER — GABAPENTIN 100 MG/1
300 CAPSULE ORAL 3 TIMES DAILY
Qty: 270 CAPSULE | Refills: 0 | Status: SHIPPED | OUTPATIENT
Start: 2021-04-06 | End: 2021-05-13

## 2021-04-06 ASSESSMENT — PAIN SCALES - GENERAL: PAINLEVEL: EXTREME PAIN (8)

## 2021-04-06 NOTE — PROGRESS NOTES
Rayray is a 25 year old who is being evaluated via a billable video visit.      How would you like to obtain your AVS? MyChart  If the video visit is dropped, the invitation should be resent by: Text to cell phone: [phone]  Will anyone else be joining your video visit? Yes: Marion (). How would they like to receive their invitation? Text to cell phone: [phone]      Patient seen with  Marion.    ALDA[facility]                                  [facility]    Date of visit: 2021      Assessment:  Rayray Contreras is a 25 year old with past medical history including: PCOS, Obesity, Depression who presents for evaluation and treatment of the followin. Chronic upper back pain: Patient reports chronic upper back pain since a work related injury (pushing a ) in 2020. She has tried physical therapy and a thoracic kit without any relief. On exam she had mild restriction in thoracic range of motion with significant tenderness/trigger points in the periscapular muscles. TPI has been completed with moderate but incomplete relief, she continues to have midline pain but not as severe paraspinal and periscapular pain as prior to the procedure. Her symptoms are likely due to a combination of chronic myofascial pain/trigger points and thoracic DDD.      Plan:  The following recommendations were given to the patient. Diagnosis, treatment options, risks, benefits, and alternatives were discussed, and all questions were answered. The patient expressed understanding of the plan for management.      I am recommending a multidisciplinary treatment plan to help this patient better manage her pain.  This includes:      1. Physical Therapy: Pain PT referral placed.  2. Clinical Health Pain Psychologist: coping well, defer.   3. Self Care Recommendations: Gentle progressive exercise that does not increase  pain - gradually increase daily walking program.  Take mini breaks - 5 minutes of mindfullness a couple times a day.   4. Diagnostic Studies: none, reviewed thoracic MRI findings with patient.  5. Medication Management:   1. Continue flexeril prn  2. Continue tylenol 650mg qid prn  3. Continue ibuprofen 400mg qid prn  4. Start gabapentin 100mg hs and increase in 100mg increments every 3-4 days to 300mg TID.  6. Further procedures recommended: trigger point injections ordered.  7. Follow up: for TPI in clinic      Chief complaint: No chief complaint on file.      Interval history:    Since her last visit, Rayray Contreras reports:  -TPI was completed on 3/4/21 with moderate improvement in periscapular pain.    -She continues to have midline pain near her bra strap.    -This is worse with lifting and repeated bending/twisting.    -She has changed the way she does some of her work activities and this is helping.         Pain Treatments:  1. Medications:       Current pain medications:  -Flexeril 5mg prn          Previous pain medications:  -Naproxen 550mg BID prn helped a little   -methocarbamol -drowsy  2. Physical Therapy: didn't help                TENS unit: didn't try  3. Pain psychology: hasn't tried  4. Surgery: none  5. Injections: thoracic kit 12/3/20 - [facility]  6. Alternative Therapies:               Chiropractic: hasn't tried              Acupuncture: hasn't tried       Side Effects: Drowsy with methocarbamol    Medications:  Current Outpatient Medications   Medication Sig Dispense Refill     escitalopram (LEXAPRO) 20 MG tablet Take 1 tablet (20 mg) by mouth daily 30 tablet 3     levonorgestrel-ethinyl estradiol (SEASONALE) 0.15-0.03 MG tablet TAKE 1 TABLET BY MOUTH DAILY 91 tablet 3     methocarbamol (ROBAXIN) 500 MG tablet Take 1 tablet (500 mg) by mouth 4 times daily as needed for muscle spasms 120 tablet 0       Medical History: any changes in medical history since they were last seen? No    Review of  Systems:  The 14 system ROS was reviewed from the intake questionnaire, and is positive for: back pain  Any bowel or bladder problems: denies  Mood: denies          Video Start Time: 4:00 PM  Video-Visit Details    Type of service:  Video Visit    Video End Time:4:23 PM    Originating Location (pt. Location): Home    Distant Location (provider location):  [facility] [place]     Platform used for Video Visit: [employer]      BILLING TIME DOCUMENTATION:   The total TIME spent on this patient on the date of the encounter/appointment was 35 minutes.      TOTAL TIME includes:   Time spent preparing to see the patient (reviewing records and tests)   Time spent face to face (or over the phone) with the patient   Time spent ordering tests, medications, procedures and referrals  Time spent Referring and communicating with other healthcare professionals  Time spent documenting clinical information in Epic        Raul Nguyen DO  [facility]  4/6/2021

## 2021-04-06 NOTE — PATIENT INSTRUCTIONS
1. I ordered physical therapy with our chronic pain therapist, you'll be called to schedule.    2. We will plan on repeating the trigger point injections in may, If your pain is better with PT you can cancel that appointment.    3. Start gabapentin 100mg at night and increase as follows: Call [phone] for refills.   1.  Gabapentin 1 tab= 100mg   AM   PM   Bedtime   0   0   100mg (1 tab). If tolerating, after 2-3 days, increase as tolerated to next line   100mg (1 tab)      0mg (1 tab)        100mg (1 tab). If tolerating, after 2-3 days, increase as tolerated to next line   100mg (1 tab)      100mg (1 tab)       100mg (1 tab). If tolerating, after 2-3 days, increase as tolerated to next line   100mg (1 tabs)  100mg (2 tabs)  200mg (2 tabs). If tolerating, after 2-3 days, increase as tolerated to next line   200mg (2 tabs)  100mg (1 tabs)  200mg (2 tabs). If tolerating, after 2-3 days, increase as tolerated to next line   200mg (2 tabs)  200mg (2 tabs)  200mg (2 tabs). If tolerating, after 2-3 days, increase as tolerated to next line   200mg (2 tabs)  200mg (2 tabs)  300mg (3 tabs). If tolerating, after 2-3 days, increase as tolerated to next line   300mg (3 tabs)  200mg (2 tabs)  300mg (3 tabs). If tolerating, after 2-3 days, increase as tolerated to next line   300mg (3 tabs)  300mg (3 tabs)  300mg (3 tabs).   Call us when you are at this dose or with any concerns.   Avoid activities that require mental alertness or coordination until you realize the effects of gabapentin as it can cause dizziness, sleepiness, fatigue and incoordination.      Take care,    Raul Nguyen,   [facility]        ----------------------------------------------------------------  Clinic Number:  [phone]     Call with any questions about your care and for scheduling assistance.     Calls are returned Monday through Friday between 8 AM and 4:30 PM. We usually get back to you within 2 business days depending on the  issue/request.    If we are prescribing your medications:    For opioid medication refills, call the clinic or send a [employer] message 7 days in advance.  Please include:    Name of requested medication    Name of the pharmacy.    For non-opioid medications, call your pharmacy directly to request a refill. Please allow 3-4 days to be processed.     Per [place] State Law:    All controlled substance prescriptions must be filled within 30 days of being written.      For those controlled substances allowing refills, pickup must occur within 30 days of last fill.      We believe regular attendance is key to your success in our program!      Any time you are unable to keep your appointment we ask that you call us at least 24 hours in advance to cancel.This will allow us to offer the appointment time to another patient.     Multiple missed appointments may lead to dismissal from the clinic.

## 2021-04-06 NOTE — PROGRESS NOTES
"Rayray is a 25 year old who is being evaluated via a billable video visit.      How would you like to obtain your AVS? MyChart  If the video visit is dropped, the invitation should be resent by: Text to cell phone: [phone]  Will anyone else be joining your video visit? Yes: Marion (). How would they like to receive their invitation? Text to cell phone: [phone]  {If patient encounters technical issues they should call [phone] :[id number]}      Claire Huff MA  [facility] [place]      Video Start Time: {video visit start/end time for provider to select:152948}  Video-Visit Details    Type of service:  Video Visit    Video End Time:{video visit start/end time for provider to select:459214}    Originating Location (pt. Location): {video visit patient location:759510::\"Home\"}    Distant Location (provider location):  [facility] [place]     Platform used for Video Visit: {Virtual Visit Platforms:089126::\"Netli\"}    "

## 2021-04-07 ENCOUNTER — TELEPHONE (OUTPATIENT)
Dept: PALLIATIVE MEDICINE | Facility: CLINIC | Age: 26
End: 2021-04-07

## 2021-04-07 NOTE — TELEPHONE ENCOUNTER
Orders received for TPI and PT Evaluation.    Routing for  approval.    Aurora JUNG    [facility]

## 2021-04-08 NOTE — TELEPHONE ENCOUNTER
LVM to schedule TPI and PT, please see PT limits below.    Aurora JUNG    [facility]

## 2021-04-08 NOTE — TELEPHONE ENCOUNTER
Incoming fax from , they are approving TPI and PT.     2x a week for 6 weeks, 12 visits.       Routing to schedule    Cait BORDEN    [facility]

## 2021-04-08 NOTE — TELEPHONE ENCOUNTER
Faxed completed PA form and supporting documentation for PT AND TPI to .  Right fax confirmation          Cait BORDEN    [facility]

## 2021-04-09 NOTE — PROGRESS NOTES
Assessment & Plan     Chronic bilateral thoracic back pain  Discussed accommodations and filled out forms for work. Forms will be faxed in. Discussed since pain starting to respond will add a bit of flexibility to restrictions.     Starting PT, encouraged at least weekly sessions and follow through with exercise at home as advised.       Assessment requiring an independent historian(s) -   Diagnosis or treatment significantly limited by social determinants of health - developmental delay  No LOS data to display   Time spent doing chart review, history and exam, documentation and further activities per the note       Continue to follow with pain clinic and PT.   Continue Gabapentin.   Follow up 1 month.    No follow-ups on file.    Milana Roman, CNP  [facility] [place]    Molina Seo is a 25 year old who presents for the following health issues  accompanied by her :    HPI     Virtual visit with PT today. Initial evaluation.   Trigger point May 13 again.  Gabapentin new-working well, still ramping up to higher dose.   Back pain 1/10 today. When pain increased after work notices it starts working quickly.    Review of Systems   Constitutional, HEENT, cardiovascular, pulmonary, GI, , musculoskeletal, neuro, skin, endocrine and psych systems are negative, except as otherwise noted.      Objective    There were no vitals taken for this visit.  There is no height or weight on file to calculate BMI.  Physical Exam   GENERAL: healthy, alert and no distress  RESP: lungs clear to auscultation - no rales, rhonchi or wheezes  CV: regular rate and rhythm, normal S1 S2, no S3 or S4, no murmur, click or rub, no peripheral edema and peripheral pulses strong  MS: spine exam shows straight, tenderness para-thoracic musculature bilaterally. Looser than on previous exam  SKIN: no suspicious lesions or rashes  NEURO: Normal strength and tone, mentation intact and speech  normal  PSYCH: mentation appears normal, affect normal/bright

## 2021-04-12 ENCOUNTER — VIRTUAL VISIT (OUTPATIENT)
Dept: PALLIATIVE MEDICINE | Facility: CLINIC | Age: 26
End: 2021-04-12
Payer: OTHER MISCELLANEOUS

## 2021-04-12 ENCOUNTER — OFFICE VISIT (OUTPATIENT)
Dept: FAMILY MEDICINE | Facility: CLINIC | Age: 26
End: 2021-04-12
Payer: OTHER MISCELLANEOUS

## 2021-04-12 VITALS
OXYGEN SATURATION: 99 % | HEART RATE: 88 BPM | DIASTOLIC BLOOD PRESSURE: 76 MMHG | HEIGHT: 62 IN | BODY MASS INDEX: 35.51 KG/M2 | TEMPERATURE: 99.3 F | SYSTOLIC BLOOD PRESSURE: 124 MMHG | WEIGHT: 193 LBS

## 2021-04-12 DIAGNOSIS — M79.18 CHRONIC MYOFASCIAL PAIN: ICD-10-CM

## 2021-04-12 DIAGNOSIS — G89.29 CHRONIC MYOFASCIAL PAIN: ICD-10-CM

## 2021-04-12 DIAGNOSIS — G89.29 CHRONIC BILATERAL THORACIC BACK PAIN: Primary | ICD-10-CM

## 2021-04-12 DIAGNOSIS — M54.6 BILATERAL THORACIC BACK PAIN, UNSPECIFIED CHRONICITY: Primary | ICD-10-CM

## 2021-04-12 DIAGNOSIS — M54.16 LUMBAR RADICULOPATHY: ICD-10-CM

## 2021-04-12 DIAGNOSIS — M79.10 TRIGGER POINT: ICD-10-CM

## 2021-04-12 DIAGNOSIS — M54.6 CHRONIC BILATERAL THORACIC BACK PAIN: Primary | ICD-10-CM

## 2021-04-12 DIAGNOSIS — R62.50 DEVELOPMENTAL DELAY: ICD-10-CM

## 2021-04-12 DIAGNOSIS — M54.14 THORACIC RADICULOPATHY: ICD-10-CM

## 2021-04-12 DIAGNOSIS — M47.24 OSTEOARTHRITIS OF SPINE WITH RADICULOPATHY, THORACIC REGION: ICD-10-CM

## 2021-04-12 PROCEDURE — 97110 THERAPEUTIC EXERCISES: CPT | Mod: GP | Performed by: PHYSICAL THERAPIST

## 2021-04-12 PROCEDURE — 99213 OFFICE O/P EST LOW 20 MIN: CPT | Performed by: NURSE PRACTITIONER

## 2021-04-12 PROCEDURE — 97161 PT EVAL LOW COMPLEX 20 MIN: CPT | Mod: GP | Performed by: PHYSICAL THERAPIST

## 2021-04-12 ASSESSMENT — MIFFLIN-ST. JEOR: SCORE: 1563.69

## 2021-04-12 NOTE — PROGRESS NOTES
"PHYSICAL THERAPY INITIAL EVALUATION and PLAN OF CARE    Patient Name: Rayray Contreras     : 1995    MRN: [id number]   Pain Management Provider:  Raul Nguyen MD  Diagnosis:    Bilateral thoracic back pain, unspecified chronicity  Chronic myofascial pain  Trigger point  Lumbar radiculopathy  Osteoarthritis of spine with radiculopathy, thoracic region  Thoracic radiculopathy     The patient has been notified of the following:  \"This virtual visit will be conducted between you and your provider.  We have found that certain health care needs can be provided without the need for physical presence.  This service lets us provide the care you need with a virtual visit.\"  Due to external, as well as internal [facility] management of the COVID 19 Virus the patient was not seen in our clinic.  As a substitution, we implemented a virtual visit to manage this patient's condition utilizing the [employer] virtual visit platform.  The provider reviewed the patient's chart and spoke with the patient to determine the following telemedicine visit is appropriate and effective for the patient's care.  The following type of visit was completed:  Video Visit:  The CU[employer] platform uses a synchronous HIPAA compliant video stream for this encounter.  Patient has given verbal consent for video visit? Yes    SUBJECTIVE:    PRESENTATION AND ETIOLOGY  Per chart review  Pain history:  Rayray Contreras is a 25 year old female who presents for initial evaluation of chief pain complaint of chronic upper back pain.      Her pain the day after she was pushing a  which had ran out of charge. The next morning as she was getting out of bed she was having sharp pains in her upper back. She did go to work the next day and she was told to be evaluated by the doctors before returning to work. Currently she is on work restrictions. She was seen by her PCP initially who referred her to PT. She didn't have much " improvement with PT and was seen by neurosurgery who ordered and MRI. There was a small disc herniation and she had a thoracic kit which did not make much of a difference.  She is taking flexeril at night due to drowsiness and this does seem to help. She has tried yoga with her sister but this didn't help much.  Onset: 3/26/20  Location/Radiation: upper back  Quality: sharp, miserable, tiring, exhausting  Severity/Intensity: 9/10 on average  Aggravating factors include: activity, lifting  Relieving factors include: lying down, muscle relaxer  Red Flags: The patient denies bowel or bladder incontinence, parasthesias, weakness, saddle anesthesia, unintentional weight loss, or fever/chills/sweats.     Pain Treatments:  1. Medications:       Current pain medications:  -Flexeril 5mg prn          Previous pain medications:  -Naproxen 550mg BID prn helped a little     2. Physical Therapy: didn't help                TENS unit: didn't try  3. Pain psychology: hasn't tried  4. Surgery: none  5. Injections: thoracic kit 12/3/20 - nh  6. Alternative Therapies:               Chiropractic: hasn't tried              Acupuncture: hasn't tried     Diagnostic tests:  MRI of thoracic spine was completed on 10/28/20 was reviewed with the patient and shows:  Mild to moderate degenerative disc height loss at T6-T7 and T8-T9. The  other intervertebral discs appear relatively maintained in height. At  T6-T7, there is a right central disc herniation which mildly indents  the right ventral aspect of the cord without cord signal change. No  other significant disc herniation identified. No significant spinal  canal stenosis. No significant neural foraminal stenosis. There appear  to be trace layering bilateral pleural effusions. The paravertebral  soft tissues appear within normal limits.                                                            IMPRESSION:  1. Mild multilevel degenerative disease, as described. No high-grade  spinal canal or  neural foraminal stenosis.  2. Trace layering bilateral pleural effusions.    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 lifting more than 20-30#  Social Support: lives in town home with mother and sister    Pertinent Medical  / Surgical History: Epic Snapshot Reviewed, See provider's note: PCOS, obesity, depression    Patient's goals for physical therapy: to reduce pain and be able to work without concern for pain; be able to resume previous work out video and wiifit     ===============================================================  OBJECTIVE:  POSTURE:  Observation: next   GAIT, LOCOMOTION, and BALANCE:  Gait and Locomotion: next  RANGE OF MOTION:   Cervical: next  Lumbar: next  Shoulders: next  Hips: nexty  MUSCLE PERFORMANCE:   Strength: demonstrates core instability; next    Flexibility: tightness noted in next  FUNCTIONAL TESTING/OBSERVATION: next  Pain behaviors: None    SELF CARE:  Aerobic activity/Walking:next  HEP: seated thoracic extension; discontinued made worse  Relaxation/Breathing: next  Mini-breaks: next  Posture: next  Pacing: next  Body Mechanics: postural support with pillows in bed  Sensory calming / Pain Flare Plan: nexty      ===============================================================  Today's Treatment:  Initial evaluation  Therapeutic Procedures/Exercises: for 30 minutes as above  ===============================================================  ASSESSMENT:  Physical Therapy Diagnosis:Impaired Posture and Impaired Muscle Performance    Patient requires PT intervention for the following  impairments: Limited knowledge of condition and / or self care - inability to control symptoms, Impaired functional mobility and Pain    Anticipated Goals and Expected Outcomes:  8 weeks  Patient will report the use of 2 self care practices during their day.  Patient will report the participation in 30-45 minutes of aerobic activity daily and practicing stretching daily.  Patient will demonstrate the ability to find core strength in neutral posture.  Patient will demonstrate the ability to relax muscle group before stretching.  16 weeks  Patient will be independent with a home exercise program.  Patient will be independent with posture correction.  Patient will report independence with a self care/flare management program.  Patient will demonstrate improved functional strength and endurance as reports by increased tolerance for IADLs and more consistent participation in daily activity.     Rehab potential for achieving goals: excellent.    ===============================================================  PLAN:   Patient will benefit from skilled physical therapy consisting of:  neuromuscular reeducation of: kinesthetic sense and posture for sitting and/or standing activities, education in self care / home management training to include instruction in: symptom control techniques, therapeutic activities to achieve improved functional performance in: daily actvities and therapeutic exercise to develop: strength and endurance, flexibility and core stability    Assessment will be ongoing with changes in treatment as indicated.  Benefits/risks/alternatives to treatment have been reviewed and the patient has been instructed to contact this office if they have any questions or concerns.  This plan of care has been discussed with the patient and the patient is in agreement.     Frequency / Duration:  Patient will be seen for a total of 2x/wk x 6 weeks; 12 visits    Total Visit Time: 45  minutes            Yadira Padron, PT                                      Date:  4/12/2021      =====================================================  **  Referring Provider Certification: Referring Provider reviewing certifies that the above treatment / plan of care is required and authorized, and that the patient's plan will be reviewed every thirty (30) days **.   ======================================================     PRESENT:  NA    MULTIDISCIPLINARY PATIENT / FAMILY EDUCATION RECORD  Department:  Physical Therapy    Readiness to Learn: Ability to understand verbal instructions, Ability to understand written instructions, Knowledge of educational needs / treatment plan  Specific Barriers to Learning: None  Referrals: None  Learning Needs: Rehabilitation techniques to improve functional independence Pain management education to improve daily activity tolerance.  Who: Patient  How: Demonstration, Verbal instructions, Written instructions  Response: Appropriate verbal response, Asked questions, Demonstrated ability, Verbalized recall / understanding     Video-Visit Details     Type of service:  Video Visit  Video Start Time: 2:00 pm  Video End Time: 2:45 pm  Originating Location (pt. Location): Home  Distant Location (provider location):  [place] [facility]   Platform used for Video Visit: [employer]

## 2021-04-15 ENCOUNTER — OFFICE VISIT (OUTPATIENT)
Dept: PALLIATIVE MEDICINE | Facility: CLINIC | Age: 26
End: 2021-04-15
Payer: OTHER MISCELLANEOUS

## 2021-04-15 DIAGNOSIS — M79.10 TRIGGER POINT: ICD-10-CM

## 2021-04-15 DIAGNOSIS — M54.16 LUMBAR RADICULOPATHY: ICD-10-CM

## 2021-04-15 DIAGNOSIS — M54.6 BILATERAL THORACIC BACK PAIN, UNSPECIFIED CHRONICITY: Primary | ICD-10-CM

## 2021-04-15 DIAGNOSIS — M79.18 CHRONIC MYOFASCIAL PAIN: ICD-10-CM

## 2021-04-15 DIAGNOSIS — G89.29 CHRONIC MYOFASCIAL PAIN: ICD-10-CM

## 2021-04-15 DIAGNOSIS — M54.14 THORACIC RADICULOPATHY: ICD-10-CM

## 2021-04-15 DIAGNOSIS — M47.24 OSTEOARTHRITIS OF SPINE WITH RADICULOPATHY, THORACIC REGION: ICD-10-CM

## 2021-04-15 PROCEDURE — 97112 NEUROMUSCULAR REEDUCATION: CPT | Mod: GP | Performed by: PHYSICAL THERAPIST

## 2021-04-21 ENCOUNTER — TELEPHONE (OUTPATIENT)
Dept: PALLIATIVE MEDICINE | Facility: CLINIC | Age: 26
End: 2021-04-21

## 2021-04-21 ENCOUNTER — MYC MEDICAL ADVICE (OUTPATIENT)
Dept: PALLIATIVE MEDICINE | Facility: CLINIC | Age: 26
End: 2021-04-21

## 2021-04-21 NOTE — TELEPHONE ENCOUNTER
Pts C Marion calling to speak with pt's physical therapist. Marion can be reached at [phone].    Aurora JUNG    [facility]

## 2021-04-22 NOTE — TELEPHONE ENCOUNTER
[employer] message sent to patient:  Good Afternoon Rayray,    Thank you for the update.  Dr Nguyen would like you to stay at the the 300 mg three times a day dose. You can discuss your plan of care at the follow up that is scheduled for 05/13/2021. Please use the medication you have.  When you have about 5 days left of medication, please let us know and we can send over a refill for you.  Hope you have a great day.    Take Care,    Miladys Low RN  Care Coordinator  [facility]

## 2021-04-22 NOTE — TELEPHONE ENCOUNTER
Will leave encounter open for patient response/chart review by nursing.       ----------------Mychart Below from pt----------------  Major Nguyen  my name is Rayray Contreras I'm almost done taking the gabapentin instructions I was wondering do I still three gabapentin  in the morning, lunch time and night. I have two bottles of gabapentin      --------------Mychart below response to pt----------------    Hello,     Jerry for reaching out. The ending dose you should be on and remain on is 300mg three times a day. As this is a medication that we slowly increase people on, we started the gradual increase using 100mg capsules. You can use the supply you have now and when you are in need of a refill let us know and we can change the prescription to use 300mg capsules. This way you only take 1 capsules three times a day.   Are you finding any pain relief benefit yet with the medication? Are you having any side effects?     Preeti ENRIQUE, RN Care Coordinator  [facility]  [facility] Management

## 2021-04-22 NOTE — TELEPHONE ENCOUNTER
[employer] message from patient on 0/22/2021 at 0954: It's helping out a little bit. The only side effect I have is tiredness   __________________________________________    Miladys Low RN  Care Coordinator  [facility]

## 2021-04-26 ENCOUNTER — MYC MEDICAL ADVICE (OUTPATIENT)
Dept: PALLIATIVE MEDICINE | Facility: CLINIC | Age: 26
End: 2021-04-26

## 2021-04-26 ENCOUNTER — OFFICE VISIT (OUTPATIENT)
Dept: PALLIATIVE MEDICINE | Facility: CLINIC | Age: 26
End: 2021-04-26
Payer: OTHER MISCELLANEOUS

## 2021-04-26 DIAGNOSIS — M47.24 OSTEOARTHRITIS OF SPINE WITH RADICULOPATHY, THORACIC REGION: ICD-10-CM

## 2021-04-26 DIAGNOSIS — M54.6 BILATERAL THORACIC BACK PAIN, UNSPECIFIED CHRONICITY: Primary | ICD-10-CM

## 2021-04-26 DIAGNOSIS — M54.16 LUMBAR RADICULOPATHY: ICD-10-CM

## 2021-04-26 DIAGNOSIS — G89.29 CHRONIC MYOFASCIAL PAIN: ICD-10-CM

## 2021-04-26 DIAGNOSIS — M79.10 TRIGGER POINT: ICD-10-CM

## 2021-04-26 DIAGNOSIS — M79.18 CHRONIC MYOFASCIAL PAIN: ICD-10-CM

## 2021-04-26 DIAGNOSIS — M54.14 THORACIC RADICULOPATHY: ICD-10-CM

## 2021-04-26 PROCEDURE — 97112 NEUROMUSCULAR REEDUCATION: CPT | Mod: GP | Performed by: PHYSICAL THERAPIST

## 2021-04-26 NOTE — PROGRESS NOTES
PAIN PHYSICAL THERAPY PROGRESS NOTE  Patient Name: Rayray Contreras      YOB: 1995     Medical Record Number: [id number]  Diagnosis:    Bilateral thoracic back pain, unspecified chronicity  Chronic myofascial pain  Trigger point  Lumbar radiculopathy  Osteoarthritis of spine with radiculopathy, thoracic region  Thoracic radiculopathy    Visit: 3/12  PRESENTATION AND ETIOLOGY  Per chart review  Pain history:  Rayray Contreras is a 25 year old female who presents for initial evaluation of chief pain complaint of chronic upper back pain.   Her pain the day after she was pushing a  which had ran out of charge. The next morning as she was getting out of bed she was having sharp pains in her upper back. She did go to work the next day and she was told to be evaluated by the doctors before returning to work. Currently she is on work restrictions. She was seen by her PCP initially who referred her to PT. She didn't have much improvement with PT and was seen by neurosurgery who ordered and MRI. There was a small disc herniation and she had a thoracic kit which did not make much of a difference.  She is taking flexeril at night due to drowsiness and this does seem to help. She has tried yoga with her sister but this didn't help much.  Onset: 3/26/20  Location/Radiation: upper back  Quality: sharp, miserable, tiring, exhausting  Severity/Intensity: 9/10 on average  Aggravating factors include: activity, lifting  Relieving factors include: lying down, muscle relaxer  Red Flags: The patient denies bowel or bladder incontinence, parasthesias, weakness, saddle anesthesia, unintentional weight loss, or fever/chills/sweats.      Pain Treatments:  1. Medications:       Current pain medications:  -Flexeril 5mg prn          Previous pain medications:  -Naproxen 550mg BID prn helped a little     2. Physical Therapy: didn't help                TENS unit: didn't try  3. Pain psychology: hasn't tried  4.  Surgery: none  5. Injections: thoracic kit 12/3/20 - nh  6. Alternative Therapies:               Chiropractic: hasn't tried              Acupuncture: hasn't tried     Diagnostic tests:  MRI of thoracic spine was completed on 10/28/20 was reviewed with the patient and shows:  Mild to moderate degenerative disc height loss at T6-T7 and T8-T9. The  other intervertebral discs appear relatively maintained in height. At  T6-T7, there is a right central disc herniation which mildly indents  the right ventral aspect of the cord without cord signal change. No  other significant disc herniation identified. No significant spinal  canal stenosis. No significant neural foraminal stenosis. There appear  to be trace layering bilateral pleural effusions. The paravertebral  soft tissues appear within normal limits.                                                            IMPRESSION:  1. Mild multilevel degenerative disease, as described. No high-grade  spinal canal or neural foraminal stenosis.  2. Trace layering bilateral pleural effusions.     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 Pain is worse with heavy pushing and bending forward    OBJECTIVE:  POSTURE:  Observation: Patient demonstrates forward head, protracted shoulders and thoracic kyphosis in standing and sitting posture.  Noted bilateral genu valgus and foot pronation  GAIT, LOCOMOTION, and BALANCE:  Gait and Locomotion: normal velocity; reduced arm swing; mild lateral sway  RANGE OF MOTION:   Cervical: WFL, mid back pain with flexion  Lumbar: WFL; 75% segmental restriction lower thoracic/lumbar with flexion  Shoulders: WFL  Hips: WFL; mild muscle guarding on the left  MUSCLE PERFORMANCE:   Strength: demonstrates core instability  Flexibility: tightness noted in thoracic psp  FUNCTIONAL TESTING/OBSERVATION: independent chair and bed mobility  Pain behaviors: None     SELF CARE:  Aerobic activity/Walking:next  HEP: seated thoracic extension; discontinued made worse  --SL hip/shoulder glides, global  --seated FB  Relaxation/Breathing: next  Mini-breaks: next  Posture: next  Pacing: next  Body Mechanics: postural support with pillows in bed; bend knees and hips with cleaning toilet  Sensory calming / Pain Flare Plan: next    Last: Instruction in supine kinesthetic body scan. Instruction in thoracic segmental differentiation and R shoulder/hip glides in SL.  Noted impaired posterior glide of shoulder reproducing thoracic back pain; improved coordination with global shoulder/hip glide pattern  Pt is wondering about using a back brace; recommended not using for long term use and to check with Dr. Nguyen at next visit  Today: Noted T11 FRS R with psp muscle guarding. Instructed in SL L shoulder/hip glides global and differentiated; noted difficulty with SB in SL; most restricted movement at T-L junction  Treatment Interventions:  Neuromuscular Reeducation:   For 45  minutes as above.  __________________________________________________________________    Assessment:  Ongoing Functional Limitations Include:  Patient tolerated/responded well to treatment    Intensity Level: 3 (1=low intensity; 5=high intensity)  Demonstrates/Verbalizes Technique: 3 (1= poor technique-difficulty performing exercises,significant cues required; 5= good technique-performs exercises without cues)  Body Awareness: 3 (1=low awareness; 5=high awareness)  Posture/Stability: 3 (1= poor posture, stability; 5= good posture, stability)  Motivational Level: Cooperative  Response to Teaching: cooperative  Factors that affect learning: None    _______________________________________________________________________  Plan of Care  Continue PT to support reactivation and integration of self regulation pain management skills;  Continue with prescribed plan of care - progress as tolerated.  Focus next session will be on: consider SL reach/roll or supine head/hip lift; supine trunk SB     Present:  VALERIE     Total Visit Time:  45 minutes    Therapist: Yadira Padron, PT           Date: 4/26/2021

## 2021-04-26 NOTE — TELEPHONE ENCOUNTER
Jalen message from patient on 04/26/2021 at 0956: Hi doctor Patrick I'm sorry for bothering you but with gabapentin can I donate my plasma?  ______________________________________________    mychart message sent to patient:  Good Afternoon Bryannia,    I asked Dr Nguyen your question about donating plasma and being on Gabapentin.  He did not know the answer, but did suggest that you contact the place you want to go to such as [facility].   I looked up there telephone number.  It is [phone].  That is the [place] location.  They should be able to tell you.  Otherwise, if you are planning on going somewhere else, you can call that facility.      Take Care,    Miladys Low RN  Care Coordinator  [facility]

## 2021-04-27 NOTE — TELEPHONE ENCOUNTER
Pts [facility] calling, please call her at [phone]        Kathleen Means    [facility]

## 2021-05-04 NOTE — TELEPHONE ENCOUNTER
Jalen message read and deleted on 04/26/2021. Closing encounter.    Miladys Low RN  Care Coordinator  [facility]

## 2021-05-06 ENCOUNTER — OFFICE VISIT (OUTPATIENT)
Dept: FAMILY MEDICINE | Facility: CLINIC | Age: 26
End: 2021-05-06
Payer: COMMERCIAL

## 2021-05-06 VITALS
SYSTOLIC BLOOD PRESSURE: 114 MMHG | TEMPERATURE: 98.7 F | BODY MASS INDEX: 36.25 KG/M2 | WEIGHT: 197 LBS | HEIGHT: 62 IN | DIASTOLIC BLOOD PRESSURE: 80 MMHG | HEART RATE: 104 BPM | OXYGEN SATURATION: 98 %

## 2021-05-06 DIAGNOSIS — D68.00 VON WILLEBRAND'S DISEASE (H): ICD-10-CM

## 2021-05-06 DIAGNOSIS — F32.5 MAJOR DEPRESSION IN COMPLETE REMISSION (H): ICD-10-CM

## 2021-05-06 DIAGNOSIS — Z00.00 ROUTINE GENERAL MEDICAL EXAMINATION AT A HEALTH CARE FACILITY: Primary | ICD-10-CM

## 2021-05-06 DIAGNOSIS — Z12.4 SCREENING FOR MALIGNANT NEOPLASM OF CERVIX: ICD-10-CM

## 2021-05-06 LAB
ERYTHROCYTE [DISTWIDTH] IN BLOOD BY AUTOMATED COUNT: 12.7 % (ref 10–15)
HCT VFR BLD AUTO: 41.3 % (ref 35–47)
HGB BLD-MCNC: 13.7 G/DL (ref 11.7–15.7)
MCH RBC QN AUTO: 32 PG (ref 26.5–33)
MCHC RBC AUTO-ENTMCNC: 33.2 G/DL (ref 31.5–36.5)
MCV RBC AUTO: 97 FL (ref 78–100)
PLATELET # BLD AUTO: 275 10E9/L (ref 150–450)
RBC # BLD AUTO: 4.28 10E12/L (ref 3.8–5.2)
WBC # BLD AUTO: 11.8 10E9/L (ref 4–11)

## 2021-05-06 PROCEDURE — 99395 PREV VISIT EST AGE 18-39: CPT | Performed by: NURSE PRACTITIONER

## 2021-05-06 PROCEDURE — 80053 COMPREHEN METABOLIC PANEL: CPT | Performed by: NURSE PRACTITIONER

## 2021-05-06 PROCEDURE — 80061 LIPID PANEL: CPT | Performed by: NURSE PRACTITIONER

## 2021-05-06 PROCEDURE — 84443 ASSAY THYROID STIM HORMONE: CPT | Performed by: NURSE PRACTITIONER

## 2021-05-06 PROCEDURE — 85027 COMPLETE CBC AUTOMATED: CPT | Performed by: NURSE PRACTITIONER

## 2021-05-06 PROCEDURE — 36415 COLL VENOUS BLD VENIPUNCTURE: CPT | Performed by: NURSE PRACTITIONER

## 2021-05-06 ASSESSMENT — MIFFLIN-ST. JEOR: SCORE: 1581.84

## 2021-05-06 NOTE — PROGRESS NOTES
SUBJECTIVE:   CC: Rayray Contreras is an 25 year old woman who presents for preventive health visit.     {  Patient has been advised of split billing requirements and indicates understanding: Yes  Healthy Habits:  Answers for HPI/ROS submitted by the patient on 4/29/2021   Annual Exam:  Frequency of exercise:: 2-3 days/week  Getting at least 3 servings of Calcium per day:: Yes  Diet:: Breakfast skipped  Taking medications regularly:: No  Medication side effects:: None  Bi-annual eye exam:: Yes  Dental care twice a year:: Yes  Sleep apnea or symptoms of sleep apnea:: Daytime drowsiness  Additional concerns today:: No  Duration of exercise:: N/A  Barriers to taking medications:: Other    Discuss Weight Loss    Declines pap today.     Today's PHQ-2 Score:   PHQ-2 ( 1999 [employer]) 4/29/2021 2/1/2021   Q1: Little interest or pleasure in doing things 1 3   Q2: Feeling down, depressed or hopeless 1 3   PHQ-2 Score 2 6   Q1: Little interest or pleasure in doing things Several days Nearly every day   Q2: Feeling down, depressed or hopeless Several days Nearly every day   PHQ-2 Score 2 6       Abuse: Current or Past(Physical, Sexual or Emotional)- No  Do you feel safe in your environment? Yes    Have you ever done Advance Care Planning? (For example, a Health Directive, POLST, or a discussion with a medical provider or your loved ones about your wishes): No, advance care planning information given to patient to review.  Patient declined advance care planning discussion at this time.    Social History     Tobacco Use     Smoking status: Never Smoker     Smokeless tobacco: Never Used   Substance Use Topics     Alcohol use: No     Alcohol/week: 0.0 standard drinks     Comment: once every few months      If you drink alcohol do you typically have >3 drinks per day or >7 drinks per week? No                     Reviewed orders with patient.  Reviewed health maintenance and updated orders accordingly - Yes  Lab work is in  process    FSH-7:   Breast CA Risk Assessment (FHS-7) 2/1/2021 4/29/2021   Did any of your first-degree relatives have breast or ovarian cancer? Yes Unknown   Did any of your relatives have bilateral breast cancer? Unknown Yes   Did any man in your family have breast cancer? No No   Did any woman in your family have breast and ovarian cancer? No No   Did any woman in your family have breast cancer before age 50 y? Unknown No   Do you have 2 or more relatives with breast and/or ovarian cancer? Unknown No   Do you have 2 or more relatives with breast and/or bowel cancer? No No       Patient under 40 years of age: Routine Mammogram Screening not recommended.   Pertinent mammograms are reviewed under the imaging tab.    Pertinent mammograms are reviewed under the imaging tab.  History of abnormal Pap smear: NO - age 30- 65 PAP every 3 years recommended  PAP / HPV 5/15/2018 10/13/2015   PAP NIL NIL     Reviewed and updated as needed this visit by clinical staff                 Reviewed and updated as needed this visit by Provider                Past Medical History:   Diagnosis Date     Concussion without loss of consciousness 9/1/2015     Depressive disorder 7/15/2010     Developmental delay      Muscle cramp 5/14/2009    Has botox injection in gastrocnemius.     PCOS (polycystic ovarian syndrome) 7/15/2010      Past Surgical History:   Procedure Laterality Date     NO HISTORY OF SURGERY         ROS:  Constitutional, HEENT, cardiovascular, pulmonary, GI, , musculoskeletal, neuro, skin, endocrine and psych systems are negative, except as otherwise noted.      OBJECTIVE:   There were no vitals taken for this visit.  EXAM:  GENERAL: healthy, alert and no distress  EYES: Eyes grossly normal to inspection, PERRL and conjunctivae and sclerae normal  HENT: ear canals and TM's normal, nose and mouth without ulcers or lesions  NECK: no adenopathy, no asymmetry, masses, or scars and thyroid normal to palpation  RESP: lungs  "clear to auscultation - no rales, rhonchi or wheezes  BREAST: normal without masses, tenderness or nipple discharge and no palpable axillary masses or adenopathy  CV: regular rate and rhythm, normal S1 S2, no S3 or S4, no murmur, click or rub, no peripheral edema and peripheral pulses strong  ABDOMEN: soft, nontender, no hepatosplenomegaly, no masses and bowel sounds normal  MS: no gross musculoskeletal defects noted, no edema  SKIN: no suspicious lesions or rashes  NEURO: Normal strength and tone, mentation intact and speech normal  PSYCH: mentation appears normal, affect normal/bright    Diagnostic Test Results:  Labs reviewed in Epic    ASSESSMENT/PLAN:   1. Routine general medical examination at a health care facility  - Lipid panel reflex to direct LDL Fasting  - TSH with free T4 reflex  - Comprehensive metabolic panel  - CBC with platelets    2. Von Willebrand's disease (H) - UNCLEAR - March of 2008 couple of heavy nose bleeds.  Tested for Von Willebrands- treated with ambicar.  Third set of testing came back negative for Von Willebrand's    Monitor as needed.     3. Major depression in complete remission (H)  Doing well with mood. Continue medications.     Patient has been advised of split billing requirements and indicates understanding: Yes  COUNSELING:   Reviewed preventive health counseling, as reflected in patient instructions  Special attention given to:        Regular exercise       Healthy diet/nutrition       Osteoporosis prevention/bone health       Safe sex practices/STD prevention    Estimated body mass index is 35.3 kg/m  as calculated from the following:    Height as of 4/12/21: 1.575 m (5' 2\").    Weight as of 4/12/21: 87.5 kg (193 lb).    Weight management plan: Discussed healthy diet and exercise guidelines discussed decreasing simple sugars, carbs and fast food.    She reports that she has never smoked. She has never used smokeless tobacco.      Counseling Resources:  ATP IV " Guidelines  Pooled Cohorts Equation Calculator  Breast Cancer Risk Calculator  BRCA-Related Cancer Risk Assessment: FHS-7 Tool  FRAX Risk Assessment  ICSI Preventive Guidelines  Dietary Guidelines for Americans, 2010  USDA's MyPlate  ASA Prophylaxis  Lung CA Screening    Milana Roman CNP  [facility]

## 2021-05-07 LAB
ALBUMIN SERPL-MCNC: 3.9 G/DL (ref 3.4–5)
ALP SERPL-CCNC: 36 U/L (ref 40–150)
ALT SERPL W P-5'-P-CCNC: 42 U/L (ref 0–50)
ANION GAP SERPL CALCULATED.3IONS-SCNC: 4 MMOL/L (ref 3–14)
AST SERPL W P-5'-P-CCNC: 31 U/L (ref 0–45)
BILIRUB SERPL-MCNC: 0.5 MG/DL (ref 0.2–1.3)
BUN SERPL-MCNC: 15 MG/DL (ref 7–30)
CALCIUM SERPL-MCNC: 8.8 MG/DL (ref 8.5–10.1)
CHLORIDE SERPL-SCNC: 108 MMOL/L (ref 94–109)
CHOLEST SERPL-MCNC: 156 MG/DL
CO2 SERPL-SCNC: 27 MMOL/L (ref 20–32)
CREAT SERPL-MCNC: 0.64 MG/DL (ref 0.52–1.04)
GFR SERPL CREATININE-BSD FRML MDRD: >90 ML/MIN/{1.73_M2}
GLUCOSE SERPL-MCNC: 65 MG/DL (ref 70–99)
HDLC SERPL-MCNC: 59 MG/DL
LDLC SERPL CALC-MCNC: 85 MG/DL
NONHDLC SERPL-MCNC: 97 MG/DL
POTASSIUM SERPL-SCNC: 4.2 MMOL/L (ref 3.4–5.3)
PROT SERPL-MCNC: 8.2 G/DL (ref 6.8–8.8)
SODIUM SERPL-SCNC: 139 MMOL/L (ref 133–144)
TRIGL SERPL-MCNC: 62 MG/DL
TSH SERPL DL<=0.005 MIU/L-ACNC: 1 MU/L (ref 0.4–4)

## 2021-05-10 ENCOUNTER — OFFICE VISIT (OUTPATIENT)
Dept: FAMILY MEDICINE | Facility: CLINIC | Age: 26
End: 2021-05-10
Payer: OTHER MISCELLANEOUS

## 2021-05-10 VITALS
DIASTOLIC BLOOD PRESSURE: 83 MMHG | TEMPERATURE: 98.1 F | SYSTOLIC BLOOD PRESSURE: 134 MMHG | HEART RATE: 70 BPM | OXYGEN SATURATION: 98 % | RESPIRATION RATE: 20 BRPM | BODY MASS INDEX: 36.4 KG/M2 | WEIGHT: 199 LBS

## 2021-05-10 DIAGNOSIS — M54.6 CHRONIC MIDLINE THORACIC BACK PAIN: Primary | ICD-10-CM

## 2021-05-10 DIAGNOSIS — G89.29 CHRONIC MIDLINE THORACIC BACK PAIN: Primary | ICD-10-CM

## 2021-05-10 PROCEDURE — 99213 OFFICE O/P EST LOW 20 MIN: CPT | Performed by: NURSE PRACTITIONER

## 2021-05-10 NOTE — PROGRESS NOTES
Assessment & Plan   Problem List Items Addressed This Visit     None      Visit Diagnoses     Chronic midline thoracic back pain    -  Primary           Prescription drug management  I spent a total of 29 minutes on the day of the visit.   Time spent doing chart review, history and exam, documentation and further activities per the note       MEDICATIONS:  Continue current medications without change    No follow-ups on file.    Milana Roman CNP  [facility] KYLER Seo is a 25 year old who presents for the following health issues  accompanied by her  for disability Marion Rajesh:    HPI   PT started and has been helpful so far. Talking about getting brace for work.   Attending PT once a week in person and then does exercises daily after work.    Still taking the gabapentin, was drowsy at first but not bothersome.     Another injection with pain this week.     Carpet cleaning at work going well. Can go back to normal work activities this week and will reassess 1 month    Review of Systems   Constitutional, HEENT, cardiovascular, pulmonary, GI, , musculoskeletal, neuro, skin, endocrine and psych systems are negative, except as otherwise noted.      Objective    /83   Pulse 70   Temp 98.1  F (36.7  C)   Resp 20   Wt 90.3 kg (199 lb)   SpO2 98%   Breastfeeding No   BMI 36.40 kg/m    Body mass index is 36.4 kg/m .  Physical Exam   GENERAL: healthy, alert and no distress  NECK: no adenopathy, no asymmetry, masses, or scars and thyroid normal to palpation  RESP: lungs clear to auscultation - no rales, rhonchi or wheezes  CV: regular rate and rhythm, normal S1 S2, no S3 or S4, no murmur, click or rub, no peripheral edema and peripheral pulses strong  ABDOMEN: soft, nontender, no hepatosplenomegaly, no masses and bowel sounds normal  MS: spine exam normal except for thoracic lumbar junction area pain T-10 area. Muscles tight parathoracic.

## 2021-05-13 ENCOUNTER — OFFICE VISIT (OUTPATIENT)
Dept: PALLIATIVE MEDICINE | Facility: CLINIC | Age: 26
End: 2021-05-13
Payer: OTHER MISCELLANEOUS

## 2021-05-13 VITALS — OXYGEN SATURATION: 98 % | SYSTOLIC BLOOD PRESSURE: 136 MMHG | HEART RATE: 65 BPM | DIASTOLIC BLOOD PRESSURE: 82 MMHG

## 2021-05-13 DIAGNOSIS — M54.6 BILATERAL THORACIC BACK PAIN, UNSPECIFIED CHRONICITY: ICD-10-CM

## 2021-05-13 DIAGNOSIS — M79.18 CHRONIC MYOFASCIAL PAIN: ICD-10-CM

## 2021-05-13 DIAGNOSIS — G89.29 CHRONIC MYOFASCIAL PAIN: ICD-10-CM

## 2021-05-13 DIAGNOSIS — M79.10 TRIGGER POINT: Primary | ICD-10-CM

## 2021-05-13 PROCEDURE — 99214 OFFICE O/P EST MOD 30 MIN: CPT | Mod: 25 | Performed by: PHYSICAL MEDICINE & REHABILITATION

## 2021-05-13 PROCEDURE — 20553 NJX 1/MLT TRIGGER POINTS 3/>: CPT | Performed by: PHYSICAL MEDICINE & REHABILITATION

## 2021-05-13 RX ORDER — GABAPENTIN 300 MG/1
600 CAPSULE ORAL 3 TIMES DAILY
Qty: 180 CAPSULE | Refills: 0 | Status: SHIPPED | OUTPATIENT
Start: 2021-05-13 | End: 2022-10-03

## 2021-05-13 RX ORDER — BUPIVACAINE HYDROCHLORIDE 5 MG/ML
5 INJECTION, SOLUTION EPIDURAL; INTRACAUDAL ONCE
Status: COMPLETED | OUTPATIENT
Start: 2021-05-13 | End: 2021-05-13

## 2021-05-13 RX ADMIN — BUPIVACAINE HYDROCHLORIDE 25 MG: 5 INJECTION, SOLUTION EPIDURAL; INTRACAUDAL at 16:36

## 2021-05-13 ASSESSMENT — PAIN SCALES - GENERAL: PAINLEVEL: SEVERE PAIN (7)

## 2021-05-13 NOTE — PROGRESS NOTES
[facility]    Date of visit: 2021      Assessment:  Rayray Contreras is a 25 year old with past medical history including: PCOS, Obesity, Depression who presents for evaluation and treatment of the followin. Chronic back pain: Patient reports chronic upper back pain since a work related injury (pushing a ) in 2020. She has tried physical therapy and a thoracic kit without any relief. On exam she continues to have mild restriction in thoracic range of motion with significant tenderness/trigger points in the periscapular muscles and lower lumbar paraspinals on the right. TPI has been completed with moderate relief and this was repeated today.  Her symptoms are likely due to a combination of chronic myofascial pain/trigger points and thoracic DDD.      Plan:  The following recommendations were given to the patient. Diagnosis, treatment options, risks, benefits, and alternatives were discussed, and all questions were answered. The patient expressed understanding of the plan for management.      I am recommending a multidisciplinary treatment plan to help this patient better manage her pain.  This includes:      1. Physical Therapy: Continue pain PT.  2. Clinical Health Pain Psychologist: coping well, defer.   3. Self Care Recommendations: Gentle progressive exercise that does not increase pain - gradually increase daily walking program.  Take mini breaks - 5 minutes of mindfullness a couple times a day.   4. Diagnostic Studies: none, reviewed thoracic MRI findings with patient.  5. Medication Management:   1. Stop flexeril   2. Continue tylenol 650mg qid prn  3. Continue ibuprofen 400mg qid prn  4. Increase gabapentin from 300mg TID to 600mg TID in 300mg increments as directed in AVS.  6. Further procedures recommended: trigger point injections can be repeated every 2+ months as needed.  7. Follow up: 2 months      Chief  complaint:   Chief Complaint   Patient presents with     Pain       Interval history:    Since her last visit, Rayray Contreras reports:  -TPI was completed on 3/4/21 with moderate improvement in periscapular pain. This gradually returned in the past few weeks.    -Pain is around the shoulder blades and a spot in the right low back.    -Gabapentin has been working well, better than the muscle relaxants. Would like to increase this dose.    -Not having any side effects with activities, no fatigue/drowsiness while working or driving.    -Stays up late playing video games certain nights that causes fatigue sometimes.           Pain Treatments:  1. Medications:       Current pain medications:  -Gabapentin 300mg TID  -Tylenol prn          Previous pain medications:  -Naproxen 550mg BID prn helped a little   -methocarbamol -drowsy  -flexeril - nh  2. Physical Therapy: didn't help  . Working with pain PT currently with improvement.              TENS unit: didn't try  3. Pain psychology: hasn't tried  4. Surgery: none  5. Injections: thoracic kit 12/3/20 - nh   -TPI - relief for about 6 weeks.  6. Alternative Therapies:               Chiropractic: hasn't tried              Acupuncture: hasn't tried       Side Effects: Denies    Medications:  Current Outpatient Medications   Medication Sig Dispense Refill     escitalopram (LEXAPRO) 20 MG tablet Take 1 tablet (20 mg) by mouth daily 30 tablet 3     levonorgestrel-ethinyl estradiol (SEASONALE) 0.15-0.03 MG tablet TAKE 1 TABLET BY MOUTH DAILY 91 tablet 3     gabapentin (NEURONTIN) 100 MG capsule Take 3 capsules (300 mg) by mouth 3 times daily 270 capsule 0       Medical History: any changes in medical history since they were last seen? No    Review of Systems:  The 14 system ROS was reviewed from the intake questionnaire, and is positive for: back pain  ]    Raul Nguyen DO  [facility]        BILLING TIME DOCUMENTATION:   The total TIME spent on this patient  on the date of the encounter/appointment was 50 minutes.      TOTAL TIME includes:   Time spent preparing to see the patient (reviewing records and tests)   Time spent face to face (or over the phone) with the patient   Time spent ordering tests, medications, procedures and referrals   Time spent Referring and communicating with other healthcare professionals   Time spent documenting clinical information in [facility] - Procedure Note     Date of Visit: 5/13/21     Pre procedure Diagnosis: myofascial pain/myositis 60.9   Post procedure Diagnosis: Same  Procedure performed: trigger point injections  Anesthesia: none  Complications: none  Operators: Raul Nguyen DO     Indications:   Rayray Contreras is a 25 year old female with a history and exam detailed above.     Options/alternatives, benefits and risks were discussed with the patient including bleeding, infection, tissue trauma and pnuemothorax.  Questions were answered to her satisfaction and she agrees to proceed. Voluntary informed consent was obtained and signed.      Vitals were reviewed: Yes  Allergies were reviewed:  Yes   Medications were reviewed:  Yes        Procedure:  After getting informed consent, a Pause for the Cause was performed.     Trigger points were identified by patient, and marked when appropriate.  The area was prepped with alcohol swabs.     Using clean technique, injections were completed using a 25G, 1.5 inch needle.  After negative aspiration, injection was completed.  A total of 7 locations were injected.  When possible, tissue was retracted from the chest wall to avoid lung injury.     Muscle groups injected:  -Bilateral levator scapulae  -Bilateral rhomboid  -Bilateral trapezius  -Right lower lumbar paraspinals     Injection solution contained:   5ml of 0.5% bupivacaine and 5ml of 1% lidocaine.     Hemostasis was achieved, the area was cleaned, and bandaids were placed when  appropriate.  The patient tolerated the procedure well.  Breath sounds were normal.          Pre-procedure pain score: 7/10  Post-procedure pain score: 0/10      Assessment/Plan: Rayray oCntreras is a 25 year old female s/p thoracic trigger and lower lumbar point injections for chronic myofascial pain.     1. Following today's procedure, the patient was advised to contact the [facility] for any of the following:              Fever, chills, or night sweats              New onset of pain, numbness, or weakness              Any questions/concerns regarding the procedure  If unable to contact the Pain Center, the patient was instructed to go to a local Emergency Room for any complications.   2. The patient will receive a follow-up call in 1 week.  3. The patient should follow-up with the referring provider in 2 weeks for post-procedure evaluation.           Raul Nguyen DO  [facility] [place]

## 2021-05-13 NOTE — PATIENT INSTRUCTIONS
Increase your gabapentin as follows: You will be getting 300mg capsules.      AM   PM   Bedtime  300   300   600mg (2 tab).  After 3-4 days, increase as tolerated to the next line  600mg (2 tab)  300   600 (2 tab).  After 3-4 days, increase as tolerated to the next line  600mg (2 tab)  600mg (2 tab)  600 (2 tab).  After 3-4 days, increase as tolerated to the next line  Call with any problems or when you are at this dose. We can prescribe 600mg tabs going forwards.       Avoid activities that require mental alertness or coordination until you realize the effects of gabapentin as it can cause dizziness, sleepiness, fatigue and incoordination.    2. Discuss using a theracane with Yadira and try it out at your next appointment.      3. Call [phone] to schedule a 2 month follow up in clinic.      [facility]   Post Procedure Instructions    Today you had:  trigger point injections       Medications used:  lidocaine   bupivicaine         Go to the emergency room if you develop any shortness of breath    Monitor the injection sites for signs and symptoms of infection-fever, chills, redness, swelling, warmth, or drainage to areas.    You may have soreness at injection sites for up to 24 hours.    You may apply ice to the painful areas to help minimize the discomfort of the needle pokes.    Do not apply heat to sites for at least 12 hours.    You may use anti-inflammatory medications or Tylenol for pain control if necessary    Pain Clinic phone number during work hours Monday-Friday:  [phone]    After hours provider line: [phone]

## 2021-06-10 ENCOUNTER — OFFICE VISIT (OUTPATIENT)
Dept: FAMILY MEDICINE | Facility: CLINIC | Age: 26
End: 2021-06-10
Payer: OTHER MISCELLANEOUS

## 2021-06-10 VITALS
WEIGHT: 196 LBS | DIASTOLIC BLOOD PRESSURE: 78 MMHG | OXYGEN SATURATION: 98 % | TEMPERATURE: 98.6 F | HEART RATE: 83 BPM | SYSTOLIC BLOOD PRESSURE: 130 MMHG | HEIGHT: 62 IN | BODY MASS INDEX: 36.07 KG/M2 | RESPIRATION RATE: 14 BRPM

## 2021-06-10 DIAGNOSIS — G89.29 CHRONIC BILATERAL THORACIC BACK PAIN: Primary | ICD-10-CM

## 2021-06-10 DIAGNOSIS — M54.6 CHRONIC BILATERAL THORACIC BACK PAIN: Primary | ICD-10-CM

## 2021-06-10 DIAGNOSIS — M54.6 CHRONIC MIDLINE THORACIC BACK PAIN: ICD-10-CM

## 2021-06-10 DIAGNOSIS — G89.29 CHRONIC MIDLINE THORACIC BACK PAIN: ICD-10-CM

## 2021-06-10 PROCEDURE — 99213 OFFICE O/P EST LOW 20 MIN: CPT | Performed by: NURSE PRACTITIONER

## 2021-06-10 ASSESSMENT — ANXIETY QUESTIONNAIRES
GAD7 TOTAL SCORE: 11
GAD7 TOTAL SCORE: 11
6. BECOMING EASILY ANNOYED OR IRRITABLE: MORE THAN HALF THE DAYS
3. WORRYING TOO MUCH ABOUT DIFFERENT THINGS: MORE THAN HALF THE DAYS
1. FEELING NERVOUS, ANXIOUS, OR ON EDGE: MORE THAN HALF THE DAYS
IF YOU CHECKED OFF ANY PROBLEMS ON THIS QUESTIONNAIRE, HOW DIFFICULT HAVE THESE PROBLEMS MADE IT FOR YOU TO DO YOUR WORK, TAKE CARE OF THINGS AT HOME, OR GET ALONG WITH OTHER PEOPLE: SOMEWHAT DIFFICULT
7. FEELING AFRAID AS IF SOMETHING AWFUL MIGHT HAPPEN: SEVERAL DAYS
5. BEING SO RESTLESS THAT IT IS HARD TO SIT STILL: SEVERAL DAYS
IF YOU CHECKED OFF ANY PROBLEMS ON THIS QUESTIONNAIRE, HOW DIFFICULT HAVE THESE PROBLEMS MADE IT FOR YOU TO DO YOUR WORK, TAKE CARE OF THINGS AT HOME, OR GET ALONG WITH OTHER PEOPLE: SOMEWHAT DIFFICULT
2. NOT BEING ABLE TO STOP OR CONTROL WORRYING: MORE THAN HALF THE DAYS
1. FEELING NERVOUS, ANXIOUS, OR ON EDGE: MORE THAN HALF THE DAYS
3. WORRYING TOO MUCH ABOUT DIFFERENT THINGS: MORE THAN HALF THE DAYS
5. BEING SO RESTLESS THAT IT IS HARD TO SIT STILL: SEVERAL DAYS
7. FEELING AFRAID AS IF SOMETHING AWFUL MIGHT HAPPEN: SEVERAL DAYS
2. NOT BEING ABLE TO STOP OR CONTROL WORRYING: MORE THAN HALF THE DAYS
6. BECOMING EASILY ANNOYED OR IRRITABLE: MORE THAN HALF THE DAYS

## 2021-06-10 ASSESSMENT — MIFFLIN-ST. JEOR: SCORE: 1572.3

## 2021-06-10 ASSESSMENT — PATIENT HEALTH QUESTIONNAIRE - PHQ9
SUM OF ALL RESPONSES TO PHQ QUESTIONS 1-9: 12
5. POOR APPETITE OR OVEREATING: SEVERAL DAYS
5. POOR APPETITE OR OVEREATING: SEVERAL DAYS

## 2021-06-10 ASSESSMENT — PAIN SCALES - GENERAL: PAINLEVEL: NO PAIN (0)

## 2021-06-10 NOTE — PROGRESS NOTES
Assessment & Plan     Chronic bilateral thoracic back pain  Improving with ongoing treatment modalities-continue PT and pain management.     Chronic midline thoracic back pain  Back to work without restrictions.      No follow-ups on file.    Milana Roman CNP  [facility]    Molina Soe is a 26 year old who presents for the following health issues  accompanied by her :    HPI     Workability forms   Ready to go back without restrictions.  Continuing with PT and pain management.   Continuing on gabapentin. Missed dose last week and felt nauseous. Discussed.     Review of Systems   Constitutional, HEENT, cardiovascular, pulmonary, GI, , musculoskeletal, neuro, skin, endocrine and psych systems are negative, except as otherwise noted.      Objective    There were no vitals taken for this visit.  There is no height or weight on file to calculate BMI.  Physical Exam   GENERAL: healthy, alert and no distress  NECK: no adenopathy, no asymmetry, masses, or scars and thyroid normal to palpation  RESP: lungs clear to auscultation - no rales, rhonchi or wheezes  CV: regular rate and rhythm, normal S1 S2, no S3 or S4, no murmur, click or rub, no peripheral edema and peripheral pulses strong  ABDOMEN: soft, nontender, no hepatosplenomegaly, no masses and bowel sounds normal  MS: no gross musculoskeletal defects noted, no edema  BACK: no CVA tenderness, no paralumbar tenderness, slight thoracic tenderness and lumbosacral junction.              RAUL Astudillo     [address] [address]  [email or web address]   Office: [phone]

## 2021-06-11 ASSESSMENT — ANXIETY QUESTIONNAIRES: GAD7 TOTAL SCORE: 11

## 2021-07-08 ENCOUNTER — OFFICE VISIT (OUTPATIENT)
Dept: PALLIATIVE MEDICINE | Facility: CLINIC | Age: 26
End: 2021-07-08
Payer: OTHER MISCELLANEOUS

## 2021-07-08 VITALS — SYSTOLIC BLOOD PRESSURE: 128 MMHG | DIASTOLIC BLOOD PRESSURE: 82 MMHG | HEART RATE: 81 BPM | OXYGEN SATURATION: 97 %

## 2021-07-08 DIAGNOSIS — M54.6 BILATERAL THORACIC BACK PAIN, UNSPECIFIED CHRONICITY: ICD-10-CM

## 2021-07-08 DIAGNOSIS — G89.29 CHRONIC MYOFASCIAL PAIN: Primary | ICD-10-CM

## 2021-07-08 DIAGNOSIS — M79.18 CHRONIC MYOFASCIAL PAIN: Primary | ICD-10-CM

## 2021-07-08 PROCEDURE — 99213 OFFICE O/P EST LOW 20 MIN: CPT | Performed by: PHYSICAL MEDICINE & REHABILITATION

## 2021-07-08 ASSESSMENT — PAIN SCALES - GENERAL: PAINLEVEL: SEVERE PAIN (6)

## 2021-07-08 NOTE — PROGRESS NOTES
[facility]    Date of visit: 21      Assessment:  Rayray Contreras is a 26 year old with past medical history including: PCOS, Obesity, Depression who presents for evaluation and treatment of the followin. Chronic back pain/Chronic myofascial pain/Trigger points: Patient reports chronic upper back pain since a work related injury (pushing a ) in 2020. She has tried physical therapy and a thoracic kit without any relief. On initial exam she had mild restriction in thoracic range of motion with significant tenderness/trigger points in the periscapular muscles and lower lumbar paraspinals on the right. TPI has been completed with moderate relief and this was repeated in may 2021.  Patient has continued their PT regimen and notes significant improvement in their upper back pain. Continues to have mild discomfort in the TL junction region while working but overall they have made significant progress. They have been working their full schedule without any restrictions and are doing well.      Plan:  The following recommendations were given to the patient. Diagnosis, treatment options, risks, benefits, and alternatives were discussed, and all questions were answered. The patient expressed understanding of the plan for management.      I am recommending a multidisciplinary treatment plan to help this patient better manage her pain.  This includes:      1. Physical Therapy: Continue exercise regimen developed with PT.  2. Clinical Health Pain Psychologist: coping well, defer.   3. Self Care Recommendations: Gentle progressive exercise that does not increase pain - gradually increase daily walking program.  Take mini breaks - 5 minutes of mindfullness a couple times a day.   4. Diagnostic Studies: none, reviewed thoracic MRI findings with patient.  5. Medication Management:   1. Stop flexeril   2. Continue tylenol 650mg qid  prn  3. Continue ibuprofen 400mg qid prn  4. Reduce your gabapentin to 300mg three times daily for 1 week.   5. If your pain is not significantly changed, then reduce to 300mg twice daily for 4 days, 300mg one daily for 4 days, then stop this medication.  6. Further procedures recommended: trigger point injections can be repeated as needed.  7. Follow up: as needed.      Raul Nguyen DO  [facility]        Chief complaint:   No chief complaint on file.      Interval history:    Since her last visit, Rayray Contreras reports:    -TPI was completed on 5/13/21 with moderate improvement in periscapular pain. They continue to have significant improvement in their upper back pain.    -They have been working without any restrictions.    -When she is doing a lot of bending and twisting she has some pain in the middle of her back.    -Otherwise she is doing well from a pain stand point.       Pain Treatments:  1. Medications:       Current pain medications:  -Gabapentin 600mg TID  -Tylenol prn          Previous pain medications:  -Naproxen 550mg BID prn helped a little   -methocarbamol -drowsy  -flexeril - nh  2. Physical Therapy: didn't help  . Working with pain PT currently with improvement.              TENS unit: didn't try  3. Pain psychology: hasn't tried  4. Surgery: none  5. Injections: thoracic kit 12/3/20 - nh   -TPI - relief for about 6 weeks.  6. Alternative Therapies:               Chiropractic: hasn't tried              Acupuncture: hasn't tried       Side Effects: Denies    Medications:  Current Outpatient Medications   Medication Sig Dispense Refill     escitalopram (LEXAPRO) 20 MG tablet Take 1 tablet (20 mg) by mouth daily 30 tablet 3     gabapentin (NEURONTIN) 300 MG capsule Take 2 capsules (600 mg) by mouth 3 times daily 180 capsule 0     levonorgestrel-ethinyl estradiol (SEASONALE) 0.15-0.03 MG tablet TAKE 1 TABLET BY MOUTH DAILY 91 tablet 3       Medical History: any changes  in medical history since they were last seen? No    Review of Systems:  Positive for: back pain    Exam:  /82   Pulse 81   SpO2 97%   Gen:NAD, pleasant  MSK: Lumbar ROM is wnl, Thoracic ROM is wnl. Mild midline pain with palpation at the TL junction. No paraspinal tenderness. Strength is 5/5 and symmetric in the lower extremities. Sensation to light touch is symmetric and intact.      Raul Nguyen DO  [facility]        BILLING TIME DOCUMENTATION:   The total TIME spent on this patient on the date of the encounter/appointment was 21 minutes.      TOTAL TIME includes:   Time spent preparing to see the patient (reviewing records and tests)   Time spent face to face (or over the phone) with the patient   Time spent ordering tests, medications, procedures and referrals   Time spent Referring and communicating with other healthcare professionals   Time spent documenting clinical information in Epic

## 2021-07-08 NOTE — PATIENT INSTRUCTIONS
1. Reduce your gabapentin to 300mg three times daily for 1 week.   If your pain is not significantly changed, then reduce to 300mg twice daily for 4 days, 300mg one daily for 4 days, then stop this medication.  Call and let us know how many of the tablets you have left and I can order a refill so you can continue to decrease this medication.    2. Try the yoga videos on this channel for mid back and low back pain.  [email or web address]    3. Follow-up as needed.      ----------------------------------------------------------------  Clinic Number:  [phone]     Call with any questions about your care and for scheduling assistance.     Calls are returned Monday through Friday between 8 AM and 4:30 PM. We usually get back to you within 2 business days depending on the issue/request.    If we are prescribing your medications:    For opioid medication refills, call the clinic or send a [employer]tek message 7 days in advance.  Please include:    Name of requested medication    Name of the pharmacy.    For non-opioid medications, call your pharmacy directly to request a refill. Please allow 3-4 days to be processed.     Per [place] State Law:    All controlled substance prescriptions must be filled within 30 days of being written.      For those controlled substances allowing refills, pickup must occur within 30 days of last fill.      We believe regular attendance is key to your success in our program!      Any time you are unable to keep your appointment we ask that you call us at least 24 hours in advance to cancel.This will allow us to offer the appointment time to another patient.     Multiple missed appointments may lead to dismissal from the clinic.

## 2021-10-21 ENCOUNTER — LAB REQUISITION (OUTPATIENT)
Dept: LAB | Facility: CLINIC | Age: 26
End: 2021-10-21

## 2021-10-21 PROCEDURE — 86762 RUBELLA ANTIBODY: CPT | Performed by: INTERNAL MEDICINE

## 2021-10-21 PROCEDURE — 86481 TB AG RESPONSE T-CELL SUSP: CPT | Performed by: INTERNAL MEDICINE

## 2021-10-21 PROCEDURE — 86706 HEP B SURFACE ANTIBODY: CPT | Performed by: INTERNAL MEDICINE

## 2021-10-21 PROCEDURE — 86735 MUMPS ANTIBODY: CPT | Performed by: INTERNAL MEDICINE

## 2021-10-21 PROCEDURE — 86765 RUBEOLA ANTIBODY: CPT | Performed by: INTERNAL MEDICINE

## 2021-10-21 PROCEDURE — 86787 VARICELLA-ZOSTER ANTIBODY: CPT | Performed by: INTERNAL MEDICINE

## 2021-10-22 LAB
GAMMA INTERFERON BACKGROUND BLD IA-ACNC: 0.06 IU/ML
HBV SURFACE AB SERPL IA-ACNC: 15.6 M[IU]/ML
M TB IFN-G BLD-IMP: NEGATIVE
M TB IFN-G CD4+ BCKGRND COR BLD-ACNC: 9.94 IU/ML
MEV IGG SER IA-ACNC: 60.9 AU/ML
MEV IGG SER IA-ACNC: POSITIVE
MITOGEN IGNF BCKGRD COR BLD-ACNC: 0 IU/ML
MITOGEN IGNF BCKGRD COR BLD-ACNC: 0.01 IU/ML
MUMPS ANTIBODY IGG INSTRUMENT VALUE: 87.2 AU/ML
MUV IGG SER QL IA: POSITIVE
QUANTIFERON MITOGEN: 10 IU/ML
QUANTIFERON NIL TUBE: 0.06 IU/ML
QUANTIFERON TB1 TUBE: 0.06 IU/ML
QUANTIFERON TB2 TUBE: 0.07
RUBV IGG SERPL QL IA: 5.62 INDEX
RUBV IGG SERPL QL IA: POSITIVE
VZV IGG SER QL IA: 300.5 INDEX
VZV IGG SER QL IA: POSITIVE

## 2021-12-29 ENCOUNTER — TELEPHONE (OUTPATIENT)
Dept: FAMILY MEDICINE | Facility: CLINIC | Age: 26
End: 2021-12-29
Payer: COMMERCIAL

## 2021-12-29 NOTE — TELEPHONE ENCOUNTER
Reason for Call:  Form, our goal is to have forms completed with 72 hours, however, some forms may require a visit or additional information.    Type of letter, form or note:  medical    Who is the form from?: [facility] (if other please explain)    Where did the form come from: form was faxed in    What clinic location was the form placed at?: [facility]    Where the form was placed: Milana Roman Box/Folder    What number is listed as a contact on the form?: [phone]       Additional comments:     Call taken on 12/29/2021 at 12:22 PM by Elise Blair

## 2022-02-08 ENCOUNTER — LAB (OUTPATIENT)
Dept: URGENT CARE | Facility: URGENT CARE | Age: 27
End: 2022-02-08
Attending: EMERGENCY MEDICINE
Payer: COMMERCIAL

## 2022-02-08 ENCOUNTER — E-VISIT (OUTPATIENT)
Dept: URGENT CARE | Facility: CLINIC | Age: 27
End: 2022-02-08
Payer: COMMERCIAL

## 2022-02-08 DIAGNOSIS — Z20.822 SUSPECTED COVID-19 VIRUS INFECTION: Primary | ICD-10-CM

## 2022-02-08 DIAGNOSIS — Z20.822 SUSPECTED COVID-19 VIRUS INFECTION: ICD-10-CM

## 2022-02-08 LAB
DEPRECATED S PYO AG THROAT QL EIA: NEGATIVE
FLUAV AG SPEC QL IA: NEGATIVE
FLUBV AG SPEC QL IA: NEGATIVE
GROUP A STREP BY PCR: NOT DETECTED
SARS-COV-2 RNA RESP QL NAA+PROBE: NEGATIVE

## 2022-02-08 PROCEDURE — 87651 STREP A DNA AMP PROBE: CPT

## 2022-02-08 PROCEDURE — 87804 INFLUENZA ASSAY W/OPTIC: CPT | Mod: 59

## 2022-02-08 PROCEDURE — 87804 INFLUENZA ASSAY W/OPTIC: CPT

## 2022-02-08 PROCEDURE — U0005 INFEC AGEN DETEC AMPLI PROBE: HCPCS

## 2022-02-08 PROCEDURE — 99421 OL DIG E/M SVC 5-10 MIN: CPT | Performed by: EMERGENCY MEDICINE

## 2022-02-08 PROCEDURE — U0003 INFECTIOUS AGENT DETECTION BY NUCLEIC ACID (DNA OR RNA); SEVERE ACUTE RESPIRATORY SYNDROME CORONAVIRUS 2 (SARS-COV-2) (CORONAVIRUS DISEASE [COVID-19]), AMPLIFIED PROBE TECHNIQUE, MAKING USE OF HIGH THROUGHPUT TECHNOLOGIES AS DESCRIBED BY CMS-2020-01-R: HCPCS

## 2022-02-08 NOTE — PATIENT INSTRUCTIONS
Rayray,    Your symptoms show that you may have coronavirus (COVID-19). This illness can cause fever, cough and trouble breathing. Many people get a mild case and get better on their own. Some people can get very sick.    Because you reported additional symptoms, I would like to also test you for strep and influenza.    What should I do?  We would like to test you for COVID-19 virus and flu and strep throat. I have placed orders for these tests. To schedule: go to your [facility] home page and scroll down to the section that says  You have an appointment that needs to be scheduled  and click the large green button that says  Schedule Now  and follow the steps to find the next available openings. It is important that when you are asked what the reason for your appointment is that you mention you need BOTH COVID and the flu and strep tests.    If you are unable to complete these [facility] scheduling steps, please call [phone] to schedule your testing.     Return to work/school/ guidance:   Please let your workplace manager and staffing office know when your isolation ends.       If you receive a positive COVID-19 test result, follow the guidance of the those who are giving you the results. Usually the return to work is 10 days from symptom onset or positive test date, whichever comes first (or in some cases 20 days if you are immunocompromised). If your symptoms started after your positive test, the 10 days should start when your symptoms started.   o If you work at [facility] [employer], you must also be cleared by Employee Occupational Health and Safety to return to work.      If you receive a negative COVID-19 test result and did not have a high risk exposure to someone with a known positive COVID-19 test, you can return to work once you're free of fever for 24 hours without fever-reducing medication and your symptoms are improving or resolved.    If you receive a negative COVID-19 test and if you had a high  risk exposure to someone who has tested positive for COVID-19 then you can return to work 14 days after your last contact with the positive individual. Follow quarantine guidance given by your doctor or public health officials.    Sign up for Flexiro[employer].   We know it's scary to hear that you might have COVID-19. We want to track your symptoms to make sure you're okay over the next 2 weeks. Please look for an email from Flex[employer]am--this is a free, online program that we'll use to keep in touch. To sign up, follow the link in the email you will receive. Learn more at [email or web address]    How can I take care of myself?  Over the counter medications may help with your symptoms like congestion, cough, chills, or fever.    There are not many effective prescription treatments for early COVID-19. Hydroxychloroquine, ivermectin, and azithromycin are not effective or recommended for COVID-19.    If your symptoms started in the last 10 days, you may be able to receive a treatment with monoclonal antibodies. This treatment can lower your risk of severe illness and going to the hospital. It is given through an IV or under your skin (subcutaneous) and must be given at an infusion center. You must be 12 or older, weight at least 88 pounds, and have a positive COVID-19 test.      If you would like to sign up to be considered to receive the monoclonal antibody medicine, please complete a participation form through the [facility] here:  MNRAP ([email or web address]/[email or web address]). You may also call the [facility] COVID-19 Public Hotline at 1-926.623.7378 (open Mon-Fri: 9am-7pm and Sat: 10am-6pm).     Not all people who are eligible will receive the medicine, since supply is limited. You will be contacted in the next 1 to 2 business days only if you are selected. If you do not receive a call, you have not been selected to receive the medicine. If you have any questions about  this medication, please contact your primary care provider. For more information, see [email or web address]/diseases/[email or web address]/[email or web address].pdf      Get lots of rest. Drink extra fluids (unless a doctor has told you not to)    Take Tylenol (acetaminophen) or ibuprofen for fever or pain. If you have liver or kidney problems, ask your family doctor if it's okay to take Tylenol or ibuprofen    Take over the counter medications for your symptoms, as directed by your doctor. You may also talk to your pharmacist.      If you have other health problems (like cancer, heart failure, an organ transplant or severe kidney disease): Call your specialty clinic if you don't feel better in the next 2 days.    Know when to call 911. Emergency warning signs include:  o Trouble breathing or shortness of breath  o Pain or pressure in the chest that doesn't go away  o Feeling confused like you haven't felt before, or not being able to wake up  o Bluish-colored lips or face    Where can I get more information?    [facility] - About COVID-19: [email or web address]     CDC - What to Do If You're Sick:   www.cdc.gov/coronavirus/2019-ncov/about/steps-when-sick.html    CDC - Ending Home Isolation:  https://www.cdc.gov/coronavirus/2019-ncov/your-health/quarantine-isolation.html    CDC - Caring for Someone:  www.cdc.gov/coronavirus/2019-ncov/if-you-are-sick/care-for-someone.html    [facility] clinical trials (COVID-19 research studies): clinicalaffairs.[facility]/n-clinical-trials    Below are the COVID-19 hotlines at the [facility] of Health ([facility]). Interpreters are available.  o For health questions: Call 930-628-2350 or 1-907.656.6872 (7 a.m. to 7 p.m.)  o For questions about schools and childcare: Call 810-663-9745 or 1-687.612.7953 (7 a.m. to 7 p.m.)  February 8, 2022  RE:  Rayray Contreras                                                                                                                   [address] [place] [address]      To whom it may concern:    I evaluated Rayray Contreras on February 8, 2022. Rayray Contreras should be excused from work/school.     They should let their workplace manager and staffing office know when their quarantine ends.    We can not give an exact date as it depends on the information below. They can calculate this on their own or work with their manager/staffing office to calculate this. (For example if they were exposed on 10/04, they would have to quarantine for 14 full days. That would be through 10/18. They could return on 10/19.)    Quarantine Guidelines:    If patient receives a positive COVID-19 test result, they should follow the guidance of those who are giving the results. Usually the return to work is 10 (or in some cases 20 days from symptom onset.) If they work at [employer], they must be cleared by Employee Occupational Health and Safety to return to work.      If patient receives a negative COVID-19 test result and did not have a high risk exposure to someone with a known positive COVID-19 test, they can return to work once they're free of fever for 24 hours without fever-reducing medication and their symptoms are improving or resolved.    If patient receives a negative COVID-19 test and if they had a high risk exposure to someone who has tested positive for COVID-19 then they can return to work 14 days after their last contact with the positive individual    Note: If there is ongoing exposure to the covid positive person, this quarantine period may be longer than 14 days. (For example, if they are continually exposed to their child, who tested positive and cannot isolate from them, then the quarantine of 7-14 days can't start until their child is no longer contagious. This is typically 10 days from onset to the child's symptoms. So the total duration may be 17-24 days in this case.)     Sincerely,  Cory Heck PA-C          o

## 2022-02-08 NOTE — PROGRESS NOTES
COVID-19 PCR test completed. Patient handout For Patients Who Have Been Tested for Covid-19 (Coronavirus) was given to the patient, which includes test result notification process.     1.53

## 2022-02-09 ENCOUNTER — E-VISIT (OUTPATIENT)
Dept: URGENT CARE | Facility: CLINIC | Age: 27
End: 2022-02-09
Payer: COMMERCIAL

## 2022-02-09 DIAGNOSIS — R09.81 NASAL CONGESTION: ICD-10-CM

## 2022-02-09 DIAGNOSIS — J06.9 VIRAL URI: ICD-10-CM

## 2022-02-09 PROCEDURE — 99421 OL DIG E/M SVC 5-10 MIN: CPT | Performed by: NURSE PRACTITIONER

## 2022-02-09 RX ORDER — FLUTICASONE PROPIONATE 50 MCG
1 SPRAY, SUSPENSION (ML) NASAL DAILY
Qty: 11.1 ML | Refills: 0 | Status: SHIPPED | OUTPATIENT
Start: 2022-02-09 | End: 2023-04-03

## 2022-02-09 RX ORDER — CETIRIZINE HYDROCHLORIDE 10 MG/1
10 TABLET ORAL DAILY
Qty: 90 TABLET | Refills: 3 | Status: SHIPPED | OUTPATIENT
Start: 2022-02-09 | End: 2023-04-03

## 2022-02-09 NOTE — PATIENT INSTRUCTIONS
You may want to try a nasal lavage (also known as nasal irrigation). You can find over-the-counter products, such as Neti-Pot, at retail locations or make your own at home. Instructions for homemade nasal lavage and more information on the process are available online at http://www.aafp.org/afp/2009/1115/p1121.html.      Viral Upper Respiratory Illness (Adult)    You have a viral upper respiratory illness (URI), which is another term for the common cold. This illness is contagious during the first few days. It is spread through the air by coughing and sneezing. It may also be spread by direct contact (touching the sick person and then touching your own eyes, nose, or mouth). Frequent handwashing will decrease risk of spread. Most viral illnesses go away within 7 to 10 days with rest and simple home remedies. Sometimes the illness may last for several weeks. Antibiotics will not kill a virus, and they are generally not prescribed for this condition.  Home care    If symptoms are severe, rest at home for the first 2 to 3 days. When you resume activity, don't let yourself get too tired.    Don't smoke. If you need help stopping, talk with your healthcare provider.    Avoid being exposed to cigarette smoke (yours or others ).    You may use acetaminophen or ibuprofen to control pain and fever, unless another medicine was prescribed. If you have chronic liver or kidney disease, have ever had a stomach ulcer or gastrointestinal bleeding, or are taking blood-thinning medicines, talk with your healthcare provider before using these medicines. Aspirin should never be given to anyone under 18 years of age who is ill with a viral infection or fever. It may cause severe liver or brain damage.    Your appetite may be poor, so a light diet is fine. Stay well hydrated by drinking 6 to 8 glasses of fluids per day (water, soft drinks, juices, tea, or soup). Extra fluids will help loosen secretions in the nose and  lungs.    Over-the-counter cold medicines will not shorten the length of time you re sick, but they may be helpful for the following symptoms: cough, sore throat, and nasal and sinus congestion. If you take prescription medicines, ask your healthcare provider or pharmacist which over-the-counter medicines are safe to use. (Note: Don't use decongestants if you have high blood pressure.)  Follow-up care  Follow up with your healthcare provider, or as advised.  When to seek medical advice  Call your healthcare provider right away if any of these occur:    Cough with lots of colored sputum (mucus)    Severe headache; face, neck, or ear pain    Difficulty swallowing due to throat pain    Fever of 100.4 F (38 C) or higher, or as directed by your healthcare provider  Call 911  Call 911 if any of these occur:    Chest pain, shortness of breath, wheezing, or difficulty breathing    Coughing up blood    Very severe pain with swallowing, especially if it goes along with a muffled voice   the [employer]f last reviewed this educational content on 6/1/2018 2000-2021 [employer]. All rights reserved. This information is not intended as a substitute for professional medical care. Always follow your healthcare professional's instructions.        Dear Rayray Contreras    After reviewing your responses, I've been able to diagnose you with?an upper respiratory infection (common cold).?     Based on your responses and diagnosis, I have prescribed flonase, zyrtec to treat your symptoms. I have sent this to your pharmacy.?     It is also important to stay well hydrated, get lots of rest and take over-the-counter decongestants,?tylenol?or ibuprofen if you?are able to?take those medications per your primary care provider to help relieve discomfort.?     It is important that you take?all of?your prescribed medication even if your symptoms are improving after a few doses.? Taking?all of?your medicine helps prevent the symptoms from  returning.?     If your symptoms worsen, you develop severe headache, vomiting, high fever (>102), or are not improving in 7 days, please contact your primary care provider for an appointment or visit any of our convenient Walk-in Care or Urgent Care Centers to be seen which can be found on our website?here.?     Thanks again for choosing?us?as your health care partner,?   ?  ELKIN Yang CNP?

## 2022-02-10 ENCOUNTER — MYC MEDICAL ADVICE (OUTPATIENT)
Dept: FAMILY MEDICINE | Facility: CLINIC | Age: 27
End: 2022-02-10
Payer: COMMERCIAL

## 2022-02-10 ENCOUNTER — TELEPHONE (OUTPATIENT)
Dept: FAMILY MEDICINE | Facility: CLINIC | Age: 27
End: 2022-02-10
Payer: COMMERCIAL

## 2022-02-10 ASSESSMENT — ANXIETY QUESTIONNAIRES
7. FEELING AFRAID AS IF SOMETHING AWFUL MIGHT HAPPEN: NOT AT ALL
2. NOT BEING ABLE TO STOP OR CONTROL WORRYING: NOT AT ALL
6. BECOMING EASILY ANNOYED OR IRRITABLE: NOT AT ALL
4. TROUBLE RELAXING: NOT AT ALL
7. FEELING AFRAID AS IF SOMETHING AWFUL MIGHT HAPPEN: NOT AT ALL
GAD7 TOTAL SCORE: 0
5. BEING SO RESTLESS THAT IT IS HARD TO SIT STILL: NOT AT ALL
3. WORRYING TOO MUCH ABOUT DIFFERENT THINGS: NOT AT ALL
1. FEELING NERVOUS, ANXIOUS, OR ON EDGE: NOT AT ALL
GAD7 TOTAL SCORE: 0
GAD7 TOTAL SCORE: 0

## 2022-02-10 ASSESSMENT — PATIENT HEALTH QUESTIONNAIRE - PHQ9
SUM OF ALL RESPONSES TO PHQ QUESTIONS 1-9: 5
SUM OF ALL RESPONSES TO PHQ QUESTIONS 1-9: 5
10. IF YOU CHECKED OFF ANY PROBLEMS, HOW DIFFICULT HAVE THESE PROBLEMS MADE IT FOR YOU TO DO YOUR WORK, TAKE CARE OF THINGS AT HOME, OR GET ALONG WITH OTHER PEOPLE: SOMEWHAT DIFFICULT

## 2022-02-10 NOTE — LETTER
February 11, 2022      Rayray Contreras  [address]  [place] [address]        To Whom It May Concern:    Rayray Contreras was seen in our clinic. She may return to work on 2/12/22. Please excuse recent absences due to illness.    Sincerely,            Milana Roman, CNP

## 2022-02-10 NOTE — TELEPHONE ENCOUNTER
Patient had e visit with urgent care and they diagnosed her with an upper respiratory infection.  She needs a letter for her work indicating the day she can return to work.  Please put the letter in her kenneth Moran

## 2022-02-10 NOTE — LETTER
February 14, 2022      Rayray Contreras  [address]  [place] [address]        To Whom It May Concern:    Rayray Contreras was seen in our clinic. She may return to work with the following:once no fever for 24 hours. Please excuse for associated illness.     Sincerely,            Milana Roman, CNP

## 2022-02-11 ASSESSMENT — ANXIETY QUESTIONNAIRES: GAD7 TOTAL SCORE: 0

## 2022-02-11 ASSESSMENT — PATIENT HEALTH QUESTIONNAIRE - PHQ9: SUM OF ALL RESPONSES TO PHQ QUESTIONS 1-9: 5

## 2022-02-11 NOTE — TELEPHONE ENCOUNTER
Pt called again this morning stating she would like this note asap and for it to state that she can return to work tomorrow, 02/12/22.    Beatris Tee Patient Representative

## 2022-02-14 ENCOUNTER — VIRTUAL VISIT (OUTPATIENT)
Dept: FAMILY MEDICINE | Facility: CLINIC | Age: 27
End: 2022-02-14
Payer: COMMERCIAL

## 2022-02-14 DIAGNOSIS — F33.1 MODERATE EPISODE OF RECURRENT MAJOR DEPRESSIVE DISORDER (H): Primary | ICD-10-CM

## 2022-02-14 DIAGNOSIS — M54.50 ACUTE BILATERAL LOW BACK PAIN WITHOUT SCIATICA: ICD-10-CM

## 2022-02-14 PROBLEM — F32.5 MAJOR DEPRESSION IN COMPLETE REMISSION (H): Status: ACTIVE | Noted: 2022-02-14

## 2022-02-14 PROCEDURE — 96127 BRIEF EMOTIONAL/BEHAV ASSMT: CPT | Mod: 95 | Performed by: NURSE PRACTITIONER

## 2022-02-14 PROCEDURE — 99214 OFFICE O/P EST MOD 30 MIN: CPT | Mod: 95 | Performed by: NURSE PRACTITIONER

## 2022-02-14 RX ORDER — FLUOXETINE 10 MG/1
10 CAPSULE ORAL DAILY
Qty: 14 CAPSULE | Refills: 0 | Status: SHIPPED | OUTPATIENT
Start: 2022-02-14 | End: 2022-10-03

## 2022-02-14 RX ORDER — CYCLOBENZAPRINE HCL 10 MG
10 TABLET ORAL
Qty: 10 TABLET | Refills: 1 | Status: SHIPPED | OUTPATIENT
Start: 2022-02-14 | End: 2022-02-24

## 2022-02-14 NOTE — PROGRESS NOTES
Rayray is a 26 year old who is being evaluated via a billable video visit.      How would you like to obtain your AVS? MyChart  If the video visit is dropped, the invitation should be resent by: Text to cell phone: see number on file  Will anyone else be joining your video visit? No      Video Start Time: 5:22 PM    1. Moderate episode of recurrent major depressive disorder (H)  Trial medication, family has used Prozac. If still noticing lability would consider further referral to psych for evaluation. Follow up 4 weeks for med check.  - FLUoxetine (PROZAC) 10 MG capsule; Take 1 capsule (10 mg) by mouth daily for 14 days Then increase to 20 mg days.  Dispense: 14 capsule; Refill: 0  - FLUoxetine (PROZAC) 20 MG capsule; Take 1 capsule (20 mg) by mouth daily  Dispense: 30 capsule; Refill: 3    2. Acute bilateral low back pain without sciatica  Low back pain across low back. Trial below and follow up if ongoing. Used to see pain for thoracic back issue, not seeing right now.   - cyclobenzaprine (FLEXERIL) 10 MG tablet; Take 1 tablet (10 mg) by mouth nightly as needed for muscle spasms  Dispense: 10 tablet; Refill: 1        Subjective   Rayray is a 26 year old who presents for the following health issues     History of Present Illness       Mental Health Follow-up:  Patient presents to follow-up on Depression & Anxiety.Patient's depression since last visit has been:  Medium  The patient is not having other symptoms associated with depression.  Patient's anxiety since last visit has been:  Medium  The patient is not having other symptoms associated with anxiety.  Any significant life events: financial concerns  Patient is not feeling anxious or having panic attacks.  Patient has no concerns about alcohol or drug use.     Social History  Tobacco Use    Smoking status: Never Smoker    Smokeless tobacco: Never Used  Alcohol use: No    Alcohol/week: 0.0 standard drinks    Comment: once every few months   Drug use: No       Today's PHQ-9         PHQ-9 Total Score:     (P) 5   PHQ-9 Q9 Thoughts of better off dead/self-harm past 2 weeks :   (P) Not at all   Thoughts of suicide or self harm:      Self-harm Plan:        Self-harm Action:          Safety concerns for self or others:           She eats 0-1 servings of fruits and vegetables daily.She consumes 1 sweetened beverage(s) daily.She exercises with enough effort to increase her heart rate 9 or less minutes per day.  She exercises with enough effort to increase her heart rate 3 or less days per week. She is missing 1 dose(s) of medications per week.  She is not taking prescribed medications regularly due to remembering to take.     Mom and sister have noticed that mood is doing worse. Moods are labile. Rayray doesn't notice as much but her family has noticed. She feels more depressed over time. Recently she lost grandmother. Then her dog passed away. Feels good one day and depressed the next. She does feel like she has been over shopping sometimes historically.    Low back pain for a few weeks. Not radiating. Worse after working all day.             Review of Systems   Constitutional, HEENT, cardiovascular, pulmonary, GI, , musculoskeletal, neuro, skin, endocrine and psych systems are negative, except as otherwise noted.      Objective           Vitals:  No vitals were obtained today due to virtual visit.    Physical Exam   GENERAL: Healthy, alert and no distress  EYES: Eyes grossly normal to inspection.  No discharge or erythema, or obvious scleral/conjunctival abnormalities.  RESP: No audible wheeze, cough, or visible cyanosis.  No visible retractions or increased work of breathing.    SKIN: Visible skin clear. No significant rash, abnormal pigmentation or lesions.  NEURO: Cranial nerves grossly intact.  Mentation and speech appropriate for age.  PSYCH: Mentation appears normal, affect normal/bright, judgement and insight intact, normal speech and appearance  well-groomed.                Video-Visit Details    Type of service:  Video Visit-changed to phone 14 minutes    Video End Time:532    Originating Location (pt. Location): Other car    Distant Location (provider location):  [facility]     Platform used for Video Visit: [employer]

## 2022-02-14 NOTE — PATIENT INSTRUCTIONS
Patient Education     Back Exercises: Abdominal Lift Brace with Marching    The abdominal lift brace with march strengthens your lower abdominal muscles, helping you keep your pelvis and back stable:    Lie on the floor with both knees bent. Put your feet flat on the floor and your arms by your sides. Tighten your abdominal muscles. Be sure to continue to breathe.    Lift one bent knee about 2 inches then return it to the floor and lift the other about 2 inches. Keep your abdominal muscles tight and continue to breathe. These motions should be slow and controlled without your pelvis rocking side to side.    Repeat 10 times.  [employer] last reviewed this educational content on 3/1/2018    0543-2169 [employer]. All rights reserved. This information is not intended as a substitute for professional medical care. Always follow your healthcare professional's instructions.           Patient Education     Back Exercises: Arm Reach    Do this exercise on your hands and knees. Keep your knees under your hips and your hands under your shoulders. Keep your spine in a neutral position (not arched or sagging). Be sure to maintain your neck s natural curve:    Stretch one arm straight out in front of you. Don t raise your head or let your supporting shoulder sag.    Hold for 5 seconds.    Return to starting position.    Repeat 5 times.    Switch arms.  [employer] last reviewed this educational content on 3/1/2018    3808-0639 [employer]. All rights reserved. This information is not intended as a substitute for professional medical care. Always follow your healthcare professional's instructions.           Patient Education     Back Exercises: Back Release  Do this exercise on your hands and knees. Keep your knees under your hips and your hands under your shoulders.        Relax your abdominal and buttocks muscles, lift your head, and let your back sag. Be sure to keep your weight evenly distributed.  Don t sit back on your hips.     Hold for 5 seconds.    Return to starting position.    Tuck your head and lift (arch) your back.    Hold for 5 seconds    Return to starting position.    Repeat 5 times.  [employer] last reviewed this educational content on 3/1/2018    8930-7209 [employer]. All rights reserved. This information is not intended as a substitute for professional medical care. Always follow your healthcare professional's instructions.           Patient Education     Back Exercises: Knee Lift         To start, lie on your back with your knees bent and feet flat on the floor. Don t press your neck or lower back to the floor. Breathe deeply. You should feel comfortable and relaxed in this position:    Start by tightening your abdominal muscles.    Lift one bent knee off the floor 2 to 4 inches.    Hold for 10 seconds. Return to start position.    Repeat 3 times.    Switch legs.  [employer] last reviewed this educational content on 3/1/2018    0520-9120 [employer]. All rights reserved. This information is not intended as a substitute for professional medical care. Always follow your healthcare professional's instructions.           Patient Education     Back Exercises: Leg Pull    To start, lie on your back with your knees bent and feet flat on the floor. Don t press your neck or lower back to the floor. Breathe deeply. You should feel comfortable and relaxed in this position.    Pull one knee to your chest.    Hold for 30 to 60 seconds. Return to starting position.    Repeat 2 times.    Switch legs.    For a double leg pull, pull both legs to your chest at the same time. Repeat 2 times.  For your safety, check with your healthcare provider before starting an exercise program.    [employer] last reviewed this educational content on 3/1/2018    9916-2056 [employer]. All rights reserved. This information is not intended as a substitute for professional medical care.  Always follow your healthcare professional's instructions.           Patient Education     Back Exercises: Leg Reach      Do this exercise on your hands and knees. Keep your knees under your hips and your hands under your shoulders. Keep your spine in a neutral position (not arched or sagging). Be sure to maintain your neck s natural curve:    Extend one leg straight back. Don t arch your back or let your head or body sag.    Hold for 5 seconds. Return to starting position.    Repeat 5 times.    Switch legs.   [employer] last reviewed this educational content on 3/1/2018    1489-9081 [employer]. All rights reserved. This information is not intended as a substitute for professional medical care. Always follow your healthcare professional's instructions.           Patient Education     Back Exercises: Lower Back Stretch    To start, sit in a chair with your feet flat on the floor. Shift your weight slightly forward. Relax, and keep your ears, shoulders, and hips aligned while you do the following:    Sit with your feet well apart.    Bend forward and touch the floor with the backs of your hands. Relax and let your body drop.    Hold for  20 seconds. Return to starting position.    Repeat  2 times.   Note: If you've had back or hip surgery, talk with your healthcare provider before doing this stretch.  [employer] last reviewed this educational content on 4/1/2020 2000-2021 [employer]. All rights reserved. This information is not intended as a substitute for professional medical care. Always follow your healthcare professional's instructions.           Patient Education     Back Exercises: Partial Curl-Ups    To start, lie on your back with your knees bent and feet flat on the floor. Don t press your neck or lower back to the floor. Breathe deeply. You should feel comfortable and relaxed in this position:    Cross your arms loosely.    Tighten your abdomen and curl half-way up, keeping your  head in line with your shoulders.    Hold for 5 seconds. Uncurl to lie down.    Repeat 2 sets of 10.   [employer] last reviewed this educational content on 3/1/2018    4061-2735 [employer]. All rights reserved. This information is not intended as a substitute for professional medical care. Always follow your healthcare professional's instructions.           Patient Education     Lower Body Exercises: Thigh (Quad) Stretch    This exercise stretches muscles in your thigh called quadriceps. These muscles help your back. As you work out, don t rush or strain.  Here are the steps to the quad stretch:       Before you start, hold or place one hand on something sturdy like a wall or chair. Stand an arm's length away with you feet flat on the floor, shoulder width apart.       With your other hand, grasp your ankle on the same side. Pull the heel toward your buttocks until you feel a stretch in your thigh. Don t arch your back. If you are unable to grab your ankle, loop a resistance band, belt, or towel around your foot.    Hold for  30 to 60 seconds. Repeat  2 times. Switch legs.  Talk with your provider  For your safety, check with your healthcare provider before starting an exercise program.  [employer] last reviewed this educational content on 7/1/2020 2000-2021 [employer]. All rights reserved. This information is not intended as a substitute for professional medical care. Always follow your healthcare professional's instructions.           Patient Education     Wall Squats    This exercise stretches and strengthens your lower body to help your back. As you work out, don t rush or strain.  Here are the steps to wall squats:    Stand with hips and shoulders touching a wall. Keep your feet hip-width apart and your ears, shoulders, hips, and feet in a line. If needed, place a rolled-up towel behind the small of your back.    Step forward about  2 feet, keeping your back against the wall. Slide  down into a sitting position. Don t let your hips go below your knees.    Hold for  5 seconds, then slide up. As you get stronger, hold the position longer.     Repeat ___ times per day.  [employer] last reviewed this educational content on 4/1/2020 2000-2021 [employer]. All rights reserved. This information is not intended as a substitute for professional medical care. Always follow your healthcare professional's instructions.           Patient Education     Lumbar Extension (Flexibility)    1. Lie face down on your belly, forehead on the floor. You can lie on a mat or towel.  2. Bend your arms next to your body and lift your upper body up onto your forearms. Your palms and forearms should be flat on the floor. Keep your belly and hips on the floor.  3. Hold your upper body up with your forearms for 20 seconds. Then slowly lower back down to the floor.  4. Repeat 2 times, or as instructed.  [employer] last reviewed this educational content on 6/1/2019 2000-2021 [employer]. All rights reserved. This information is not intended as a substitute for professional medical care. Always follow your healthcare professional's instructions.           Patient Education     Lumbar Flexion (Flexibility)    5. Lie on your back on the floor, with your knees bent and your feet flat on the floor.  6. Gently pull your knees up toward your chest. Put your hands under your thighs to help pull your knees up.  7. Press your lower back down to the floor. Hold for 20 seconds.  8. Lower your legs back down to the floor and relax.  9. Repeat 2 times, or as instructed.  [employer] last reviewed this educational content on 6/1/2019 2000-2021 [employer]. All rights reserved. This information is not intended as a substitute for professional medical care. Always follow your healthcare professional's instructions.           Patient Education     Prone Multifidus Activation (Strength)    10. Lie on  your belly on the floor with a pillow under your hips. You can lie on a mat or towel.  11. Hold your arms straight along your sides.  12. Slowly raise your chest off the floor, gently pulling your arms behind you. Keep your neck straight and your ears in line with your shoulders. Hold for 30 seconds, then lie back down.  13. Repeat 5 times, or as instructed.  [employer] last reviewed this educational content on 6/1/2019 2000-2021 [employer]. All rights reserved. This information is not intended as a substitute for professional medical care. Always follow your healthcare professional's instructions.           Patient Education     Back Safety: Sitting  Sitting can strain your back if you don t do it correctly. Learn the right moves to protect your back.   Sitting down  Follow these steps to sit down. Reverse them to get back up.     Make sure the chair is behind you.    Place one foot slightly behind the other.    Tighten your stomach muscles. Bend forward from the hips, keeping your back straight.    Hold the armrests or sides of the seat for support.    Bend your knees. Use your leg muscles to lower yourself onto the seat.    Scoot back in the seat until you are comfortable.   Sitting safely    Keep your feet flat. Don t cross your legs.    A low footrest (no higher than 8 inches) may help.    A support behind your lower back or at your shoulder blades can help make you more comfortable.    When sitting for long periods, change your position from time to time. Also, get up every half hour and move around.    [employer] last reviewed this educational content on 5/1/2020 2000-2021 [employer]. All rights reserved. This information is not intended as a substitute for professional medical care. Always follow your healthcare professional's instructions.

## 2022-06-02 ENCOUNTER — TELEPHONE (OUTPATIENT)
Dept: PALLIATIVE MEDICINE | Facility: CLINIC | Age: 27
End: 2022-06-02

## 2022-06-02 NOTE — TELEPHONE ENCOUNTER
Transfer note:   Pt to transfer to PCP     Preeti ENRIQUE, RN Care Coordinator  [facility]  [facility]

## 2022-07-31 ENCOUNTER — HEALTH MAINTENANCE LETTER (OUTPATIENT)
Age: 27
End: 2022-07-31

## 2022-08-11 ENCOUNTER — E-VISIT (OUTPATIENT)
Dept: FAMILY MEDICINE | Facility: CLINIC | Age: 27
End: 2022-08-11
Payer: COMMERCIAL

## 2022-08-11 DIAGNOSIS — J06.9 VIRAL URI: Primary | ICD-10-CM

## 2022-08-11 PROCEDURE — 99421 OL DIG E/M SVC 5-10 MIN: CPT | Performed by: NURSE PRACTITIONER

## 2022-08-12 NOTE — PATIENT INSTRUCTIONS
The symptoms you describe suggest a viral cause, which is much more common than a bacterial cause. Antibiotics will treat bacterial infections, but have no effect on viral infections. If possible, especially if improving, start with symptom care for the first 7-10 days, then consider seeking further treatment or taking an antibiotic. Bacterial infections generally are more severe, including symptoms such as pus, fever over 101degrees F, or rapidly worsening.    Viral Upper Respiratory Illness (Adult)    You have a viral upper respiratory illness (URI), which is another term for the common cold. This illness is contagious during the first few days. It is spread through the air by coughing and sneezing. It may also be spread by direct contact (touching the sick person and then touching your own eyes, nose, or mouth). Frequent handwashing will decrease risk of spread. Most viral illnesses go away within 7 to 10 days with rest and simple home remedies. Sometimes the illness may last for several weeks. Antibiotics will not kill a virus, and they are generally not prescribed for this condition.  Home care  If symptoms are severe, rest at home for the first 2 to 3 days. When you resume activity, don't let yourself get too tired.  Don't smoke. If you need help stopping, talk with your healthcare provider.  Avoid being exposed to cigarette smoke (yours or others ).  You may use acetaminophen or ibuprofen to control pain and fever, unless another medicine was prescribed. If you have chronic liver or kidney disease, have ever had a stomach ulcer or gastrointestinal bleeding, or are taking blood-thinning medicines, talk with your healthcare provider before using these medicines. Aspirin should never be given to anyone under 18 years of age who is ill with a viral infection or fever. It may cause severe liver or brain damage.  Your appetite may be poor, so a light diet is fine. Stay well hydrated by drinking 6 to 8 glasses of  fluids per day (water, soft drinks, juices, tea, or soup). Extra fluids will help loosen secretions in the nose and lungs.  Over-the-counter cold medicines will not shorten the length of time you re sick, but they may be helpful for the following symptoms: cough, sore throat, and nasal and sinus congestion. If you take prescription medicines, ask your healthcare provider or pharmacist which over-the-counter medicines are safe to use. (Note: Don't use decongestants if you have high blood pressure.)  Follow-up care  Follow up with your healthcare provider, or as advised.  When to seek medical advice  Call your healthcare provider right away if any of these occur:  Cough with lots of colored sputum (mucus)  Severe headache; face, neck, or ear pain  Difficulty swallowing due to throat pain  Fever of 100.4 F (38 C) or higher, or as directed by your healthcare provider  Call 911  Call 911 if any of these occur:  Chest pain, shortness of breath, wheezing, or difficulty breathing  Coughing up blood  Very severe pain with swallowing, especially if it goes along with a muffled voice   [employer] last reviewed this educational content on 6/1/2018 2000-2021 [employer]. All rights reserved. This information is not intended as a substitute for professional medical care. Always follow your healthcare professional's instructions.        Continue to use your flonase inhaler and zyrtec.

## 2022-08-15 ENCOUNTER — TELEPHONE (OUTPATIENT)
Dept: FAMILY MEDICINE | Facility: CLINIC | Age: 27
End: 2022-08-15

## 2022-08-15 ENCOUNTER — VIRTUAL VISIT (OUTPATIENT)
Dept: FAMILY MEDICINE | Facility: CLINIC | Age: 27
End: 2022-08-15
Payer: COMMERCIAL

## 2022-08-15 DIAGNOSIS — U07.1 INFECTION DUE TO 2019 NOVEL CORONAVIRUS: Primary | ICD-10-CM

## 2022-08-15 PROCEDURE — 99213 OFFICE O/P EST LOW 20 MIN: CPT | Mod: 95 | Performed by: INTERNAL MEDICINE

## 2022-08-15 ASSESSMENT — PATIENT HEALTH QUESTIONNAIRE - PHQ9
SUM OF ALL RESPONSES TO PHQ QUESTIONS 1-9: 6
10. IF YOU CHECKED OFF ANY PROBLEMS, HOW DIFFICULT HAVE THESE PROBLEMS MADE IT FOR YOU TO DO YOUR WORK, TAKE CARE OF THINGS AT HOME, OR GET ALONG WITH OTHER PEOPLE: NOT DIFFICULT AT ALL
SUM OF ALL RESPONSES TO PHQ QUESTIONS 1-9: 6

## 2022-08-15 NOTE — PROGRESS NOTES
Rayray is a 27 year old who is being evaluated via a billable video visit.      How would you like to obtain your AVS? MyChart  If the video visit is dropped, the invitation should be resent by: Text to cell phone: [phone]  Will anyone else be joining your video visit? No          Assessment & Plan     Infection due to 2019 novel coronavirus  Started having symptoms of COVID on Thursday  Tested negative on that day  She has been wearing mask and continue to work  Today she tested positive  No fever  No body aches  She has some cough  She has a pulse oximeter at home  Advised to check sats  She wants to start Paxlovid  Discussed the side effects  Discussed medicine interactions  She is on oral contraceptive pill  I advised her to have additional protection for 5 days during Paxilovid therapy and 5 days after that  - nirmatrelvir and ritonavir (PAXLOVID) therapy pack; Take 3 tablets by mouth 2 times daily for 5 days (Take 2 Nirmatrelvir tablets and 1 Ritonavir tablet twice daily for 5 days)    Of note patient also has history of depression  She is on paroxetine but is not taking it  PHQ-9 score is abnormal  I discussed this with her  She does not have any active thoughts of self-harm  Only passive thoughts at times  Crisis information provided    25 minutes spent on the date of the encounter doing chart review, history and exam, documentation and further activities per the note       Depression Screening Follow Up    PHQ 8/15/2022   PHQ-9 Total Score 6   Q9: Thoughts of better off dead/self-harm past 2 weeks Several days   F/U: Thoughts of suicide or self-harm No   F/U: Safety concerns No     Last PHQ-9 8/15/2022   1.  Little interest or pleasure in doing things 1   2.  Feeling down, depressed, or hopeless 1   3.  Trouble falling or staying asleep, or sleeping too much 1   4.  Feeling tired or having little energy 0   5.  Poor appetite or overeating 0   6.  Feeling bad about yourself 1   7.  Trouble concentrating  0   8.  Moving slowly or restless 1   Q9: Thoughts of better off dead/self-harm past 2 weeks 1   PHQ-9 Total Score 6   Difficulty at work, home, or with people -   In the past two weeks have you had thoughts of suicide or self harm? No   Do you have concerns about your personal safety or the safety of others? No         No flowsheet data found.      Follow Up      Follow Up Actions Taken  Crisis resource information provided in the After Visit Summary    Discussed the following ways the patient can remain in a safe environment:  remove alcohol, remove drugs and be around others      Return in about 4 weeks (around 9/12/2022).    Kam Garces MD  [facility]    Molina Seo is a 27 year old, presenting for the following health issues:  No chief complaint on file.      HPI           Review of Systems   Constitutional, HEENT, cardiovascular, pulmonary, gi and gu systems are negative, except as otherwise noted.      Objective           Vitals:  No vitals were obtained today due to virtual visit.    Physical Exam   GENERAL: Healthy, alert and no distress  EYES: Eyes grossly normal to inspection.  No discharge or erythema, or obvious scleral/conjunctival abnormalities.  RESP: No audible wheeze, cough, or visible cyanosis.  No visible retractions or increased work of breathing.    SKIN: Visible skin clear. No significant rash, abnormal pigmentation or lesions.  NEURO: Cranial nerves grossly intact.  Mentation and speech appropriate for age.  PSYCH: Mentation appears normal, affect normal/bright, judgement and insight intact, normal speech and appearance well-groomed.                Video-Visit Details    Video Start Time: 2:20 PM    Type of service:  Video Visit    Video End Time:2:42 PM    Originating Location (pt. Location): Home    Distant Location (provider location):  [facility]     Platform used for Video Visit: [employer]    .  ..  Answers for HPI/ROS submitted  by the patient on 8/15/2022  If you checked off any problems, how difficult have these problems made it for you to do your work, take care of things at home, or get along with other people?: Not difficult at all  PHQ9 TOTAL SCORE: 6

## 2022-08-15 NOTE — PATIENT INSTRUCTIONS
If you are in crisis, you can call the Crisis Connection Hotline 24/7 at [phone]  [facility] [place] open 24/7 for anyone in crisis for assessment, evaluation and care: [phone]  If you are thinking of hurting yourself or someone else, you can also go to the emergency department or call 107

## 2022-09-26 ASSESSMENT — ENCOUNTER SYMPTOMS
HEMATURIA: 0
COUGH: 0
PARESTHESIAS: 0
FEVER: 0
MYALGIAS: 1
DIARRHEA: 0
CHILLS: 0
DIZZINESS: 0
SHORTNESS OF BREATH: 0
HEADACHES: 1
NERVOUS/ANXIOUS: 1
EYE PAIN: 0
NAUSEA: 0
HEARTBURN: 0
CONSTIPATION: 0
WEAKNESS: 0
DYSURIA: 0
HEMATOCHEZIA: 0
BREAST MASS: 0
JOINT SWELLING: 0
PALPITATIONS: 0
ABDOMINAL PAIN: 0
FREQUENCY: 0
SORE THROAT: 0
ARTHRALGIAS: 1

## 2022-10-03 ENCOUNTER — OFFICE VISIT (OUTPATIENT)
Dept: FAMILY MEDICINE | Facility: CLINIC | Age: 27
End: 2022-10-03
Payer: COMMERCIAL

## 2022-10-03 VITALS
WEIGHT: 193 LBS | OXYGEN SATURATION: 99 % | DIASTOLIC BLOOD PRESSURE: 62 MMHG | HEART RATE: 80 BPM | BODY MASS INDEX: 35.51 KG/M2 | TEMPERATURE: 97.4 F | SYSTOLIC BLOOD PRESSURE: 102 MMHG | HEIGHT: 62 IN

## 2022-10-03 DIAGNOSIS — Z23 HIGH PRIORITY FOR 2019-NCOV VACCINE: ICD-10-CM

## 2022-10-03 DIAGNOSIS — Z13.29 THYROID DISORDER SCREENING: ICD-10-CM

## 2022-10-03 DIAGNOSIS — Z13.1 SCREENING FOR DIABETES MELLITUS: ICD-10-CM

## 2022-10-03 DIAGNOSIS — E28.2 PCOS (POLYCYSTIC OVARIAN SYNDROME): ICD-10-CM

## 2022-10-03 DIAGNOSIS — M25.50 MULTIPLE JOINT PAIN: ICD-10-CM

## 2022-10-03 DIAGNOSIS — Z00.00 ROUTINE GENERAL MEDICAL EXAMINATION AT A HEALTH CARE FACILITY: Primary | ICD-10-CM

## 2022-10-03 DIAGNOSIS — D68.00 VON WILLEBRAND'S DISEASE (H): ICD-10-CM

## 2022-10-03 DIAGNOSIS — F33.1 MODERATE EPISODE OF RECURRENT MAJOR DEPRESSIVE DISORDER (H): ICD-10-CM

## 2022-10-03 DIAGNOSIS — Z12.4 CERVICAL CANCER SCREENING: ICD-10-CM

## 2022-10-03 DIAGNOSIS — Z13.220 SCREENING CHOLESTEROL LEVEL: ICD-10-CM

## 2022-10-03 DIAGNOSIS — Z13.0 SCREENING FOR DEFICIENCY ANEMIA: ICD-10-CM

## 2022-10-03 LAB
ALBUMIN SERPL-MCNC: 4 G/DL (ref 3.4–5)
ALP SERPL-CCNC: 42 U/L (ref 40–150)
ALT SERPL W P-5'-P-CCNC: 65 U/L (ref 0–50)
ANION GAP SERPL CALCULATED.3IONS-SCNC: 9 MMOL/L (ref 3–14)
AST SERPL W P-5'-P-CCNC: 38 U/L (ref 0–45)
BILIRUB SERPL-MCNC: 0.8 MG/DL (ref 0.2–1.3)
BUN SERPL-MCNC: 9 MG/DL (ref 7–30)
CALCIUM SERPL-MCNC: 9.3 MG/DL (ref 8.5–10.1)
CHLORIDE BLD-SCNC: 105 MMOL/L (ref 94–109)
CHOLEST SERPL-MCNC: 163 MG/DL
CO2 SERPL-SCNC: 22 MMOL/L (ref 20–32)
CREAT SERPL-MCNC: 0.62 MG/DL (ref 0.52–1.04)
CRP SERPL-MCNC: 3.21 MG/L
ERYTHROCYTE [DISTWIDTH] IN BLOOD BY AUTOMATED COUNT: 12.4 % (ref 10–15)
ERYTHROCYTE [SEDIMENTATION RATE] IN BLOOD BY WESTERGREN METHOD: 13 MM/HR (ref 0–20)
FASTING STATUS PATIENT QL REPORTED: YES
GFR SERPL CREATININE-BSD FRML MDRD: >90 ML/MIN/1.73M2
GLUCOSE BLD-MCNC: 74 MG/DL (ref 70–99)
HCT VFR BLD AUTO: 41.6 % (ref 35–47)
HDLC SERPL-MCNC: 55 MG/DL
HGB BLD-MCNC: 13.8 G/DL (ref 11.7–15.7)
LDLC SERPL CALC-MCNC: 99 MG/DL
MCH RBC QN AUTO: 31.7 PG (ref 26.5–33)
MCHC RBC AUTO-ENTMCNC: 33.2 G/DL (ref 31.5–36.5)
MCV RBC AUTO: 95 FL (ref 78–100)
NONHDLC SERPL-MCNC: 108 MG/DL
PLATELET # BLD AUTO: 284 10E3/UL (ref 150–450)
POTASSIUM BLD-SCNC: 4 MMOL/L (ref 3.4–5.3)
PROT SERPL-MCNC: 8.2 G/DL (ref 6.8–8.8)
RBC # BLD AUTO: 4.36 10E6/UL (ref 3.8–5.2)
SODIUM SERPL-SCNC: 136 MMOL/L (ref 133–144)
TRIGL SERPL-MCNC: 43 MG/DL
TSH SERPL DL<=0.005 MIU/L-ACNC: 1.73 MU/L (ref 0.4–4)
WBC # BLD AUTO: 8.1 10E3/UL (ref 4–11)

## 2022-10-03 PROCEDURE — 86200 CCP ANTIBODY: CPT | Performed by: NURSE PRACTITIONER

## 2022-10-03 PROCEDURE — 86038 ANTINUCLEAR ANTIBODIES: CPT | Performed by: NURSE PRACTITIONER

## 2022-10-03 PROCEDURE — 99395 PREV VISIT EST AGE 18-39: CPT | Mod: 25 | Performed by: NURSE PRACTITIONER

## 2022-10-03 PROCEDURE — 91312 COVID-19,PF,PFIZER BOOSTER BIVALENT: CPT | Performed by: NURSE PRACTITIONER

## 2022-10-03 PROCEDURE — G0145 SCR C/V CYTO,THINLAYER,RESCR: HCPCS | Performed by: NURSE PRACTITIONER

## 2022-10-03 PROCEDURE — 86140 C-REACTIVE PROTEIN: CPT | Performed by: NURSE PRACTITIONER

## 2022-10-03 PROCEDURE — 90471 IMMUNIZATION ADMIN: CPT | Performed by: NURSE PRACTITIONER

## 2022-10-03 PROCEDURE — 80053 COMPREHEN METABOLIC PANEL: CPT | Performed by: NURSE PRACTITIONER

## 2022-10-03 PROCEDURE — 85652 RBC SED RATE AUTOMATED: CPT | Performed by: NURSE PRACTITIONER

## 2022-10-03 PROCEDURE — 36415 COLL VENOUS BLD VENIPUNCTURE: CPT | Performed by: NURSE PRACTITIONER

## 2022-10-03 PROCEDURE — 84443 ASSAY THYROID STIM HORMONE: CPT | Performed by: NURSE PRACTITIONER

## 2022-10-03 PROCEDURE — 90715 TDAP VACCINE 7 YRS/> IM: CPT | Performed by: NURSE PRACTITIONER

## 2022-10-03 PROCEDURE — 85027 COMPLETE CBC AUTOMATED: CPT | Performed by: NURSE PRACTITIONER

## 2022-10-03 PROCEDURE — 0124A COVID-19,PF,PFIZER BOOSTER BIVALENT: CPT | Performed by: NURSE PRACTITIONER

## 2022-10-03 PROCEDURE — 86431 RHEUMATOID FACTOR QUANT: CPT | Performed by: NURSE PRACTITIONER

## 2022-10-03 PROCEDURE — 80061 LIPID PANEL: CPT | Performed by: NURSE PRACTITIONER

## 2022-10-03 PROCEDURE — 99214 OFFICE O/P EST MOD 30 MIN: CPT | Mod: 25 | Performed by: NURSE PRACTITIONER

## 2022-10-03 RX ORDER — LEVONORGESTREL AND ETHINYL ESTRADIOL 0.15-0.03
1 KIT ORAL DAILY
Qty: 91 TABLET | Refills: 3 | Status: SHIPPED | OUTPATIENT
Start: 2022-10-03 | End: 2023-09-25

## 2022-10-03 ASSESSMENT — ENCOUNTER SYMPTOMS
HEARTBURN: 0
NAUSEA: 0
PALPITATIONS: 0
PARESTHESIAS: 0
HEMATURIA: 0
HEADACHES: 1
CHILLS: 0
FEVER: 0
ARTHRALGIAS: 1
MYALGIAS: 1
SHORTNESS OF BREATH: 0
NERVOUS/ANXIOUS: 1
JOINT SWELLING: 0
SORE THROAT: 0
DYSURIA: 0
DIZZINESS: 0
EYE PAIN: 0
WEAKNESS: 0
ABDOMINAL PAIN: 0
DIARRHEA: 0
FREQUENCY: 0
HEMATOCHEZIA: 0
BREAST MASS: 0
COUGH: 0
CONSTIPATION: 0

## 2022-10-03 ASSESSMENT — PATIENT HEALTH QUESTIONNAIRE - PHQ9
10. IF YOU CHECKED OFF ANY PROBLEMS, HOW DIFFICULT HAVE THESE PROBLEMS MADE IT FOR YOU TO DO YOUR WORK, TAKE CARE OF THINGS AT HOME, OR GET ALONG WITH OTHER PEOPLE: NOT DIFFICULT AT ALL
SUM OF ALL RESPONSES TO PHQ QUESTIONS 1-9: 5
SUM OF ALL RESPONSES TO PHQ QUESTIONS 1-9: 5

## 2022-10-03 NOTE — PROGRESS NOTES
SUBJECTIVE:   CC: Rayray is an 27 year old who presents for preventive health visit.     Patient has been advised of split billing requirements and indicates understanding: Yes     Healthy Habits:     Getting at least 3 servings of Calcium per day:  NO    Bi-annual eye exam:  Yes    Dental care twice a year:  Yes    Sleep apnea or symptoms of sleep apnea:  Daytime drowsiness    Diet:  Regular (no restrictions) and Breakfast skipped    Frequency of exercise:  1 day/week    Duration of exercise:  30-45 minutes    Taking medications regularly:  Yes    Medication side effects:  None    PHQ-2 Total Score: 2    Additional concerns today:  No     Multiple joint pain for some time. Gotten worse.   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   Aunt with RA. Unknown if fibromyalgia or other joint/pain diseases in family.     Due for pap. Would like COVID vaccine. TDAP due as well.    Mood stable on Prozac. No side effects.    Today's PHQ-2 Score:   PHQ-2 ( 1999 Pfizer) 9/26/2022   Q1: Little interest or pleasure in doing things 0   Q2: Feeling down, depressed or hopeless 1   PHQ-2 Score 1   PHQ-2 Total Score (12-17 Years)- Positive if 3 or more points; Administer PHQ-A if positive -   Q1: Little interest or pleasure in doing things Not at all   Q2: Feeling down, depressed or hopeless Several days   PHQ-2 Score 1       Abuse: Current or Past (Physical, Sexual or Emotional) - No  Do you feel safe in your environment? Yes        Social History     Tobacco Use     Smoking status: Never Smoker     Smokeless tobacco: Never Used   Substance Use Topics     Alcohol use: No     Alcohol/week: 0.0 standard drinks     Comment: once every few months      If you drink alcohol do you typically have >3 drinks per day or >7 drinks per week? No    Alcohol Use 9/26/2022   Prescreen: >3 drinks/day or >7 drinks/week? No   Prescreen: >3 drinks/day or >7 drinks/week? -       Reviewed orders with patient.  Reviewed health maintenance and  updated orders accordingly - Yes  Lab work is in process    Breast Cancer Screening:    FHS-7:   Breast CA Risk Assessment (FHS-7) 2/1/2021 4/29/2021 4/11/2022 4/11/2022 4/26/2022 4/26/2022 9/26/2022   Did any of your first-degree relatives have breast or ovarian cancer? Yes Unknown No No No No Unknown   Did any of your relatives have bilateral breast cancer? Unknown Yes Unknown Unknown Unknown Unknown Unknown   Did any man in your family have breast cancer? No No No No No No Unknown   Did any woman in your family have breast and ovarian cancer? No No No No No No Unknown   Did any woman in your family have breast cancer before age 50 y? Unknown No No No No No Unknown   Do you have 2 or more relatives with breast and/or ovarian cancer? Unknown No No No No No No   Do you have 2 or more relatives with breast and/or bowel cancer? No No No No No No No     click delete button to remove this line now  Patient under 40 years of age: Routine Mammogram Screening not recommended.   Pertinent mammograms are reviewed under the imaging tab.    History of abnormal Pap smear: NO - age 30- 65 PAP every 3 years recommended  PAP / HPV 5/15/2018 10/13/2015   PAP (Historical) NIL NIL     Reviewed and updated as needed this visit by clinical staff                    Reviewed and updated as needed this visit by Provider                   Past Medical History:   Diagnosis Date     Concussion without loss of consciousness 9/1/2015     Depressive disorder 7/15/2010     Developmental delay      Muscle cramp 5/14/2009    Has botox injection in gastrocnemius.     PCOS (polycystic ovarian syndrome) 7/15/2010      Past Surgical History:   Procedure Laterality Date     NO HISTORY OF SURGERY         Review of Systems   Constitutional: Negative for chills and fever.   HENT: Negative for congestion, ear pain, hearing loss and sore throat.    Eyes: Negative for pain and visual disturbance.   Respiratory: Negative for cough and shortness of breath.     Cardiovascular: Negative for chest pain, palpitations and peripheral edema.   Gastrointestinal: Negative for abdominal pain, constipation, diarrhea, heartburn, hematochezia and nausea.   Breasts:  Negative for tenderness, breast mass and discharge.   Genitourinary: Negative for dysuria, frequency, genital sores, hematuria, pelvic pain, urgency, vaginal bleeding and vaginal discharge.   Musculoskeletal: Positive for arthralgias and myalgias. Negative for joint swelling.   Skin: Negative for rash.   Neurological: Positive for headaches. Negative for dizziness, weakness and paresthesias.   Psychiatric/Behavioral: Negative for mood changes. The patient is nervous/anxious.              OBJECTIVE:   There were no vitals taken for this visit.  Physical Exam  GENERAL: healthy, alert and no distress  EYES: Eyes grossly normal to inspection, PERRL and conjunctivae and sclerae normal  HENT: ear canals and TM's normal, nose and mouth without ulcers or lesions  NECK: no adenopathy, no asymmetry, masses, or scars and thyroid normal to palpation  RESP: lungs clear to auscultation - no rales, rhonchi or wheezes  BREAST: normal without masses, tenderness or nipple discharge and no palpable axillary masses or adenopathy  CV: regular rate and rhythm, normal S1 S2, no S3 or S4, no murmur, click or rub, no peripheral edema and peripheral pulses strong  ABDOMEN: soft, nontender, no hepatosplenomegaly, no masses and bowel sounds normal   (female): normal female external genitalia, normal urethral meatus, vaginal mucosa pink, moist, well rugated, and normal cervix/adnexa/uterus without masses or discharge  MS: no gross musculoskeletal defects noted, no edema  SKIN: no suspicious lesions or rashes  NEURO: Normal strength and tone, mentation intact and speech normal  PSYCH: mentation appears normal, affect normal/bright    Diagnostic Test Results:  Labs reviewed in Epic    ASSESSMENT/PLAN:   Rayray was seen today for physical and  imm/inj.    Diagnoses and all orders for this visit:    Routine general medical examination at a health care facility  -     TDAP VACCINE (Adacel, Boostrix)  -     REVIEW OF HEALTH MAINTENANCE PROTOCOL ORDERS    Cervical cancer screening  -     Pap Screen reflex to HPV if ASCUS - recommend age 25 - 29    PCOS (polycystic ovarian syndrome)  -     levonorgestrel-ethinyl estradiol (SEASONALE) 0.15-0.03 MG tablet; Take 1 tablet by mouth daily    Von Willebrand's disease (H) - UNCLEAR - March of 2008 couple of heavy nose bleeds.  Tested for Von Willebrands- treated with ambicar.  Third set of testing came back negative for Von Willebrand's    Watch for symptoms. Further evaluation if repeated symptoms/issues.    Moderate episode of recurrent major depressive disorder (H)  Stable on fluoxetine.  -     FLUoxetine (PROZAC) 20 MG capsule; Take 1 capsule (20 mg) by mouth daily    Multiple joint pain  Evaluate labs. Family history of RA.  -     ESR: Erythrocyte sedimentation rate; Future  -     CRP, inflammation; Future  -     Anti Nuclear Karime IgG by IFA with Reflex; Future  -     Rheumatoid factor; Future  -     Cyclic Citrullinated Peptide Antibody IgG; Future  -     ESR: Erythrocyte sedimentation rate  -     CRP, inflammation  -     Anti Nuclear Karime IgG by IFA with Reflex  -     Rheumatoid factor  -     Cyclic Citrullinated Peptide Antibody IgG    Screening for deficiency anemia  -     CBC with platelets; Future  -     CBC with platelets    Screening cholesterol level  -     Lipid panel reflex to direct LDL Fasting; Future  -     Lipid panel reflex to direct LDL Fasting    Screening for diabetes mellitus  -     Comprehensive metabolic panel (BMP + Alb, Alk Phos, ALT, AST, Total. Bili, TP); Future  -     Comprehensive metabolic panel (BMP + Alb, Alk Phos, ALT, AST, Total. Bili, TP)    Thyroid disorder screening  -     TSH with free T4 reflex; Future  -     TSH with free T4 reflex    High priority for 2019-nCoV vaccine  -      "COVID-19,PF,PFIZER BOOSTER BIVALENT 12+Yrs        Patient has been advised of split billing requirements and indicates understanding: Yes    COUNSELING:  Reviewed preventive health counseling, as reflected in patient instructions    Estimated body mass index is 35.85 kg/m  as calculated from the following:    Height as of 6/10/21: 1.575 m (5' 2\").    Weight as of 6/10/21: 88.9 kg (196 lb).    Weight management plan: Discussed healthy diet and exercise guidelines    She reports that she has never smoked. She has never used smokeless tobacco.      Counseling Resources:  ATP IV Guidelines  Pooled Cohorts Equation Calculator  Breast Cancer Risk Calculator  BRCA-Related Cancer Risk Assessment: FHS-7 Tool  FRAX Risk Assessment  ICSI Preventive Guidelines  Dietary Guidelines for Americans, 2010  USDA's MyPlate  ASA Prophylaxis  Lung CA Screening    Milana Roman, [facility] [place]  Answers for HPI/ROS submitted by the patient on 10/3/2022  If you checked off any problems, how difficult have these problems made it for you to do your work, take care of things at home, or get along with other people?: Not difficult at all  PHQ9 TOTAL SCORE: 5      "

## 2022-10-04 LAB
ANA SER QL IF: NEGATIVE
CCP AB SER IA-ACNC: 1.1 U/ML
RHEUMATOID FACT SER NEPH-ACNC: <6 IU/ML

## 2022-10-05 LAB
BKR LAB AP GYN ADEQUACY: NORMAL
BKR LAB AP GYN INTERPRETATION: NORMAL
BKR LAB AP HPV REFLEX: NORMAL
BKR LAB AP PREVIOUS ABNORMAL: NORMAL
PATH REPORT.COMMENTS IMP SPEC: NORMAL
PATH REPORT.COMMENTS IMP SPEC: NORMAL
PATH REPORT.RELEVANT HX SPEC: NORMAL

## 2022-10-06 ENCOUNTER — TELEPHONE (OUTPATIENT)
Dept: FAMILY MEDICINE | Facility: CLINIC | Age: 27
End: 2022-10-06

## 2022-10-06 DIAGNOSIS — M79.18 MYALGIA, MULTIPLE SITES: Primary | ICD-10-CM

## 2022-10-06 NOTE — TELEPHONE ENCOUNTER
Patient wondering what next steps should be or what she should be doing about the high levels that came back for her liver function blood work     Please call patient to advise       Elysia Moran

## 2022-10-07 NOTE — TELEPHONE ENCOUNTER
Very minimally elevated-I would recommend recheck in a couple months to make sure it normalizes. If stays high we usually do ultrasound of the liver.    Milana Roman, CNP

## 2022-10-07 NOTE — TELEPHONE ENCOUNTER
Patient has been advised. Patient is wanting a pain clinic referral for fibromyalgia.     Rohan Sun

## 2022-11-14 ENCOUNTER — MYC MEDICAL ADVICE (OUTPATIENT)
Dept: ANESTHESIOLOGY | Facility: CLINIC | Age: 27
End: 2022-11-14

## 2022-11-14 ENCOUNTER — TELEPHONE (OUTPATIENT)
Dept: ANESTHESIOLOGY | Facility: CLINIC | Age: 27
End: 2022-11-14

## 2022-11-14 NOTE — TELEPHONE ENCOUNTER
Left message with Pain Clinic number to reschedule. Provider out sick and appt today 11/14/22 is cancel.

## 2022-12-11 NOTE — NURSING NOTE
"Chief Complaint   Patient presents with     Urgent Care     URI     cough, chest congestion, back pain, sinus congestion, chronic sinus infections        Initial /78  Pulse 65  Temp 98.8  F (37.1  C) (Oral)  Wt 182 lb (82.6 kg)  SpO2 97%  BMI 33.29 kg/m2 Estimated body mass index is 33.29 kg/(m^2) as calculated from the following:    Height as of 7/17/17: 5' 2\" (1.575 m).    Weight as of this encounter: 182 lb (82.6 kg).  Medication Reconciliation: incomplete       Nava Richardson  CMA      " Home

## 2023-03-01 NOTE — TELEPHONE ENCOUNTER
Return to work letter written and saved in letters.  Please send without my signature.    Doug Puga MD     [Follow-up] : a follow-up of an existing diagnosis [Family Member] : family member [Source: ______] : History obtained from [unfilled] [FreeTextEntry1] : Exocrine pancreatic insufficiency

## 2023-04-02 ASSESSMENT — ANXIETY QUESTIONNAIRES
6. BECOMING EASILY ANNOYED OR IRRITABLE: SEVERAL DAYS
GAD7 TOTAL SCORE: 2
5. BEING SO RESTLESS THAT IT IS HARD TO SIT STILL: NOT AT ALL
8. IF YOU CHECKED OFF ANY PROBLEMS, HOW DIFFICULT HAVE THESE MADE IT FOR YOU TO DO YOUR WORK, TAKE CARE OF THINGS AT HOME, OR GET ALONG WITH OTHER PEOPLE?: NOT DIFFICULT AT ALL
GAD7 TOTAL SCORE: 2
GAD7 TOTAL SCORE: 2
3. WORRYING TOO MUCH ABOUT DIFFERENT THINGS: SEVERAL DAYS
4. TROUBLE RELAXING: NOT AT ALL
2. NOT BEING ABLE TO STOP OR CONTROL WORRYING: NOT AT ALL
7. FEELING AFRAID AS IF SOMETHING AWFUL MIGHT HAPPEN: NOT AT ALL
IF YOU CHECKED OFF ANY PROBLEMS ON THIS QUESTIONNAIRE, HOW DIFFICULT HAVE THESE PROBLEMS MADE IT FOR YOU TO DO YOUR WORK, TAKE CARE OF THINGS AT HOME, OR GET ALONG WITH OTHER PEOPLE: NOT DIFFICULT AT ALL
7. FEELING AFRAID AS IF SOMETHING AWFUL MIGHT HAPPEN: NOT AT ALL
1. FEELING NERVOUS, ANXIOUS, OR ON EDGE: NOT AT ALL

## 2023-04-03 ENCOUNTER — VIRTUAL VISIT (OUTPATIENT)
Dept: FAMILY MEDICINE | Facility: CLINIC | Age: 28
End: 2023-04-03
Payer: COMMERCIAL

## 2023-04-03 DIAGNOSIS — R45.4 IRRITABILITY: ICD-10-CM

## 2023-04-03 DIAGNOSIS — F33.1 MODERATE EPISODE OF RECURRENT MAJOR DEPRESSIVE DISORDER (H): Primary | ICD-10-CM

## 2023-04-03 DIAGNOSIS — D68.00 VON WILLEBRAND'S DISEASE (H): ICD-10-CM

## 2023-04-03 PROCEDURE — 99214 OFFICE O/P EST MOD 30 MIN: CPT | Mod: VID | Performed by: NURSE PRACTITIONER

## 2023-04-03 RX ORDER — FLUOXETINE 40 MG/1
40 CAPSULE ORAL DAILY
Qty: 30 CAPSULE | Refills: 1 | Status: SHIPPED | OUTPATIENT
Start: 2023-04-03 | End: 2023-07-20 | Stop reason: DRUGHIGH

## 2023-04-03 ASSESSMENT — PATIENT HEALTH QUESTIONNAIRE - PHQ9
10. IF YOU CHECKED OFF ANY PROBLEMS, HOW DIFFICULT HAVE THESE PROBLEMS MADE IT FOR YOU TO DO YOUR WORK, TAKE CARE OF THINGS AT HOME, OR GET ALONG WITH OTHER PEOPLE: NOT DIFFICULT AT ALL
SUM OF ALL RESPONSES TO PHQ QUESTIONS 1-9: 5

## 2023-04-03 ASSESSMENT — ANXIETY QUESTIONNAIRES: GAD7 TOTAL SCORE: 2

## 2023-04-03 NOTE — PATIENT INSTRUCTIONS
Psychology today .[email or web address] go under find a therapist      As of April 1, 2018, [facility] suicide prevention and mental health crisis texting services are now available 24 hours a day, seven days a week. People who text [place] to 516797 will be connected to Crisis Text Line. Crisis Text Line handles 50,000 messages per month and over 20 million messages since 2013 from across the [place] connecting people to local resources in their community. For callers who are the most in distress, average wait times for a response is only 39 seconds.        [facility] Mobile Crisis Response Services  (contact the county where the person is physically located at the time of the crisis)  *[place] (Child and Adult):    [phone]  *[place] (Child and Adult):    [phone]  *[place] (Child and Adult):    [phone]  *[place] (Child):    [phone]                           (Adult):    [phone]  *[place] (Child):    [phone]                 (Adult):    [phone]  *[place] (Child and Adult):    [phone]  *[place] (Child and Adult):    [phone]  Other Crisis Resources (non-mobile)  *[facility] (formerly Crisis [facility]vention Center):    [phone]                                Walk-in and telephone crisis intervention services (focuses on >18 year-olds)                         Located at [address] [address]            Crisis Resources   These hotlines are for both adults and children. They and are open 24 hours a day, 7 days a week unless noted otherwise.    National Suicide Prevention Lifeline   988 or 7-240-198-TALK 8255)    Crisis Text Line    www.crisistextline.org  Text HOME to 858499 from anywhere in the [place], anytime, about any type of crisis. A live, trained crisis counselor will receive the text and respond quickly.    [place]  Lifeline for LGBTQ Youth  A national crisis intervention and suicide lifeline for LGBTQ youth under 25. Provides a safe place to talk without judgement. Call 1-444.304.5500; text START to 762345 or visit www.thetrevorproject.org to talk to a trained counselor.                 Text Telephone:    2-625-259-4TTY (4233)                 LGBT Youth Suicide Hotline:    7-289-3-U-MAX      *[facility] [place] [facility] ( women and children):    [phone]  or  199.390.5034  *[facility] Crisis Line ( women and children):    583.331.5787                                                                                 Short-term Residential Crisis Resources  *[facility] (ages 10-17):  [phone]                                               [address] [place] [address]   *[facility] Crisis [facility] (Birth - 6 years):    [phone]  *[facility] Crisis [facility] (Birth - 12 years):    [phone]                                                                             Inpatient Hospitalization and Residential Evaluation  *[facility] (Child and Adult)                                               [address] [place] [address]                                Inpatient Intake [phone]                                [facility]:    [phone]                                Day Treatment/Behavioral Intake:    [phone]  *[facility] (Adult):    [phone]

## 2023-04-03 NOTE — PROGRESS NOTES
"Rayray is a 27 year old who is being evaluated via a billable video visit.      How would you like to obtain your AVS? MyChart  If the video visit is dropped, the invitation should be resent by: Text to cell phone: [phone]  Will anyone else be joining your video visit? No    Assessment & Plan     Moderate episode of recurrent major depressive disorder (H)  Irritability  Increase fluoxitine today.  Follow up to see how medication is working in about 4-6weeks. Let us know sooner if there are any problems.   Counseling and psych testing.  Referrals done and they will reach out to you to schedule.   Rayray verbalizes understanding of plan of care and is in agreement.   - Adult Mental Health  Referral  - FLUoxetine (PROZAC) 40 MG capsule  Dispense: 30 capsule; Refill: 1  - Adult Mental Health  Referral    Von Willebrand's disease (H)  Stable.       BMI:   Estimated body mass index is 35.3 kg/m  as calculated from the following:    Height as of 10/3/22: 1.575 m (5' 2\").    Weight as of 10/3/22: 87.5 kg (193 lb).         Return in about 6 weeks (around 5/15/2023) for Medication recheck, Recheck, mood check, virtual follow up.      ELKIN Gill [facility]   Rayray is a 27 year old, presenting for the following health issues:  increase medication (fluoxitine)        4/3/2023     4:47 PM   Additional Questions   Roomed by pedro brambila   Accompanied by self         4/3/2023     4:47 PM   Patient Reported Additional Medications   Patient reports taking the following new medications none     History of Present Illness       Mental Health Follow-up:  Patient presents to follow-up on Depression & Anxiety.Patient's depression since last visit has been:  Medium  The patient is not having other symptoms associated with depression.  Patient's anxiety since last visit has been:  Medium  The patient is not having other symptoms associated with anxiety.  Any " significant life events: No  Patient is not feeling anxious or having panic attacks.  Patient has no concerns about alcohol or drug use.    She eats 0-1 servings of fruits and vegetables daily.She consumes 1 sweetened beverage(s) daily.She exercises with enough effort to increase her heart rate 9 or less minutes per day.  She exercises with enough effort to increase her heart rate 3 or less days per week. She is missing 1 dose(s) of medications per week.  She is not taking prescribed medications regularly due to remembering to take.    Today's PHQ-9         PHQ-9 Total Score: 5    PHQ-9 Q9 Thoughts of better off dead/self-harm past 2 weeks :   Not at all    How difficult have these problems made it for you to do your work, take care of things at home, or get along with other people: Not difficult at all  Today's KWAKU-7 Score: 2       Wants to be tested for bipolar.   Reports more depression irritability  denies labile mood.   Would like increase in fluoxitine.   Denies family history of bipolar.      Social History     Tobacco Use     Smoking status: Never     Smokeless tobacco: Never   Vaping Use     Vaping status: Never Used   Substance Use Topics     Alcohol use: No     Comment: once every few months      Drug use: No         8/15/2022     8:57 AM 10/3/2022     8:54 AM 4/2/2023    12:50 PM   PHQ   PHQ-9 Total Score 6 5 5   Q9: Thoughts of better off dead/self-harm past 2 weeks Several days Not at all Not at all   F/U: Thoughts of suicide or self-harm No     F/U: Safety concerns No           2/10/2022     7:23 PM 11/7/2022    12:14 PM 4/2/2023    12:50 PM   KWAKU-7 SCORE   Total Score 0 (minimal anxiety) 2 (minimal anxiety) 2 (minimal anxiety)   Total Score 0 2 2         4/2/2023    12:50 PM   Last PHQ-9   1.  Little interest or pleasure in doing things 0   2.  Feeling down, depressed, or hopeless 1   3.  Trouble falling or staying asleep, or sleeping too much 1   4.  Feeling tired or having little energy 1   5.   Poor appetite or overeating 1   6.  Feeling bad about yourself 1   7.  Trouble concentrating 0   8.  Moving slowly or restless 0   Q9: Thoughts of better off dead/self-harm past 2 weeks 0   PHQ-9 Total Score 5         4/2/2023    12:50 PM   KWAKU-7    1. Feeling nervous, anxious, or on edge 0   2. Not being able to stop or control worrying 0   3. Worrying too much about different things 1   4. Trouble relaxing 0   5. Being so restless that it is hard to sit still 0   6. Becoming easily annoyed or irritable 1   7. Feeling afraid, as if something awful might happen 0   KWAKU-7 Total Score 2   If you checked any problems, how difficult have they made it for you to do your work, take care of things at home, or get along with other people? Not difficult at all         Review of Systems   Constitutional, HEENT, cardiovascular, pulmonary, GI, , musculoskeletal, neuro, skin, endocrine and psych systems are negative, except as otherwise noted in the HPI.      Objective           Vitals:  No vitals were obtained today due to virtual visit.    Physical Exam   GENERAL: Healthy, alert and no distress  EYES: Eyes grossly normal to inspection.  No discharge or erythema, or obvious scleral/conjunctival abnormalities.  RESP: No audible wheeze, cough, or visible cyanosis.  No visible retractions or increased work of breathing.    SKIN: Visible skin clear. No significant rash, abnormal pigmentation or lesions.  NEURO: Cranial nerves grossly intact.  Mentation and speech appropriate for age.  PSYCH: Mentation appears normal, affect flat, judgement and insight intact, normal speech and appearance well-groomed.      Video-Visit Details    Type of service:  Video Visit     Originating Location (pt. Location): Home  Distant Location (provider location):  On-site  Platform used for Video Visit: [employer]

## 2023-05-15 ASSESSMENT — PAIN SCALES - PAIN ENJOYMENT GENERAL ACTIVITY SCALE (PEG)
INTERFERED_ENJOYMENT_LIFE: 8
INTERFERED_GENERAL_ACTIVITY: 8
PEG_TOTALSCORE: 8
PEG_TOTALSCORE: 8
AVG_PAIN_PASTWEEK: 8
AVG_PAIN_PASTWEEK: 8
INTERFERED_GENERAL_ACTIVITY: 8
INTERFERED_ENJOYMENT_LIFE: 8

## 2023-05-15 ASSESSMENT — ANXIETY QUESTIONNAIRES
3. WORRYING TOO MUCH ABOUT DIFFERENT THINGS: SEVERAL DAYS
GAD7 TOTAL SCORE: 3
5. BEING SO RESTLESS THAT IT IS HARD TO SIT STILL: NOT AT ALL
2. NOT BEING ABLE TO STOP OR CONTROL WORRYING: NOT AT ALL
1. FEELING NERVOUS, ANXIOUS, OR ON EDGE: SEVERAL DAYS
4. TROUBLE RELAXING: NOT AT ALL
7. FEELING AFRAID AS IF SOMETHING AWFUL MIGHT HAPPEN: NOT AT ALL
GAD7 TOTAL SCORE: 3
6. BECOMING EASILY ANNOYED OR IRRITABLE: SEVERAL DAYS
8. IF YOU CHECKED OFF ANY PROBLEMS, HOW DIFFICULT HAVE THESE MADE IT FOR YOU TO DO YOUR WORK, TAKE CARE OF THINGS AT HOME, OR GET ALONG WITH OTHER PEOPLE?: NOT DIFFICULT AT ALL
7. FEELING AFRAID AS IF SOMETHING AWFUL MIGHT HAPPEN: NOT AT ALL
IF YOU CHECKED OFF ANY PROBLEMS ON THIS QUESTIONNAIRE, HOW DIFFICULT HAVE THESE PROBLEMS MADE IT FOR YOU TO DO YOUR WORK, TAKE CARE OF THINGS AT HOME, OR GET ALONG WITH OTHER PEOPLE: NOT DIFFICULT AT ALL

## 2023-05-17 ENCOUNTER — OFFICE VISIT (OUTPATIENT)
Dept: PALLIATIVE MEDICINE | Facility: CLINIC | Age: 28
End: 2023-05-17
Payer: COMMERCIAL

## 2023-05-17 VITALS — SYSTOLIC BLOOD PRESSURE: 150 MMHG | OXYGEN SATURATION: 98 % | HEART RATE: 76 BPM | DIASTOLIC BLOOD PRESSURE: 82 MMHG

## 2023-05-17 DIAGNOSIS — M54.50 CHRONIC LOW BACK PAIN WITHOUT SCIATICA, UNSPECIFIED BACK PAIN LATERALITY: Primary | ICD-10-CM

## 2023-05-17 DIAGNOSIS — M79.18 MYALGIA, MULTIPLE SITES: ICD-10-CM

## 2023-05-17 DIAGNOSIS — G89.29 CHRONIC LOW BACK PAIN WITHOUT SCIATICA, UNSPECIFIED BACK PAIN LATERALITY: Primary | ICD-10-CM

## 2023-05-17 DIAGNOSIS — M77.8 TENDONITIS OF WRIST, RIGHT: ICD-10-CM

## 2023-05-17 PROCEDURE — 99215 OFFICE O/P EST HI 40 MIN: CPT | Performed by: NURSE PRACTITIONER

## 2023-05-17 RX ORDER — GABAPENTIN 300 MG/1
300 CAPSULE ORAL 3 TIMES DAILY
Qty: 90 CAPSULE | Refills: 1 | Status: SHIPPED | OUTPATIENT
Start: 2023-05-17 | End: 2023-07-20 | Stop reason: SINTOL

## 2023-05-17 ASSESSMENT — PAIN SCALES - GENERAL: PAINLEVEL: EXTREME PAIN (8)

## 2023-05-17 NOTE — NURSING NOTE
5/17/2023     2:16 PM   PEG Score   PEG Total Score 8         Claire Huff MA  [facility] [place]

## 2023-05-17 NOTE — PROGRESS NOTES
[facility] Pain Management     Date of Visit: 5/17/2023  Last visit: 7/8/21 (Patrick)  Original Consult: 2/2/21 (Patrick)    Reason for visit:  Consultation/ Transfer of Care:: Rayray Contreras is a 27 year old female with PMH significant for depression, PCOS who is seen today at the request of Milana Roman for evaluation of her pain issues and recommendations for management. Rayray has been seen at a pain clinic in the past by Dr. Nguyen (see below.) Originally referred for this consultation on 10/10/2022, has cancelled 4 previously scheduled visits.       Referred By    Milana Roman, CNP   [address] [facility] [address]   Phone: [phone]   Fax: [phone]    Diagnoses: Myalgia, multiple sites   Order: Pain Management  Referral        Relevant records:  2/2/21: Dr. Nguyen: Seen for chronic upper back pain after work related injury in 2020. TPIs ordered, helpful.   7/8/21: recommendations from last visit: Taper off gabapentin, continue HEP from PT, repeat TPI as needed.   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  - Has done her PT exercises at home to treat her lower back pain. Sometimes she feels like her hips lock up when she leans forwards. Occasionally notices shooting pain down her lower legs. Happens once a month. She is unsure how long it lasts and she just keeps going.   - At the end of her work shift, her bilateral ankles are painful.  - Last week she had a sharp pulling sensation in the back of her legs. This improve with elevating her legs.   - Does not sleep well, not really sure if this is due to pain or not. Wakes up at 3 am and cannot go back to sleep. Tried many different mattress toppers to help with her lower back pain.   - Right 3rd and 4th fingers get numb after she sleeps. 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 She also notices this when lying on her stomach while playing video games. Tries to move her fingers, this usually resolves the pain. Sometime notices that she has more symptoms of this when lying on her right side too long.   - The patient otherwise denies bowel or bladder incontinence, parasthesias, weakness, saddle anesthesia, unintentional weight loss, or fever/chills/sweats.     Pain description:  Location:     Quality: sharp, aching pain (lower back), sharp pain when it goes down her legs.   Duration: Constant (lower back pain)  Severity/Intensity (0 = No pain to 10 = Worst pain imaginable)   Now: 10, Average: 8, Best: 7, Worst: 7  Aggravating factors include: exercise  Relieving factors include: lying down, sitting, relaxation    Current pain medications:  acetaminophen - somewhat helpful for headaches.      Review of [place] Prescription Monitoring Program ():   No concern for abuse or misuse of controlled medications based on this report. Viewed on 5/17/2023 No controlled medications are being prescribed.    PAIN MANAGEMENT TREATMENT HISTORY  1. MEDICATIONS:  Opiates: not tried  NSAIDS:Ibuprofen, but had bloody noses when she took this   Muscle Relaxants: cyclobenzaprine- made her feel sleepy, robaxin - unsure if it was helpful  Anti-depressants: Bupropion, prozac  Neuropathics: Gabapentin: helpful in the past, stopped taking it after upper back pain resolved.   Topicals: icy hot patches -helpful, has only used one time  Adjuvant pain medications: acetaminophen  2. PHYSICAL THERAPY:    4/12/21 & 4/15/21: Pain PT with Yadira Padron.   Traditional spine PT prior to that - worsened lower back pain  3. PAIN PSYCHOLOGY:   Has not tried  4. SURGERY:   No pain related surgeries  5. INJECTIONS:   5/13/21: TPIs with Dr. Nguyen  4/15/21: TPIs with Dr. Nguyen  3/4/21: TPIs with Dr. Nguyen  12/03/2020: T6-T7 interlaminar epidural steroid injection with Dr. Benedict -not helpful  6. COMPLEMENTARY  THERAPY:  Chiropractic: Has not tried  Acupuncture/acupressure:Has not tried  TENS unit:Has not tried  heat/heat packs:Tried, helpful  cold/cold packs:Tried, not helpful    Imaging:  Thoracic MRI was done on 10/28/2020  T6-T7, there is a right central disc herniation which mildly indents  the right ventral aspect of the cord without cord signal change. No  other significant disc herniation identified    7/12/2017: EMG right UE: normal result    Past Medical History:  She  has a past medical history of Concussion without loss of consciousness (9/1/2015), Depressive disorder (7/15/2010), Developmental delay, Muscle cramp (5/14/2009), and PCOS (polycystic ovarian syndrome) (7/15/2010).   Past Surgical History:  She  has a past surgical history that includes no history of surgery.   Social History:  Social History     Tobacco Use     Smoking status: Never     Smokeless tobacco: Never   Vaping Use     Vaping status: Never Used   Substance Use Topics     Alcohol use: No     Comment: once every few months      Drug use: No      Social History     Social History Narrative     Not on file        Current Outpatient Medications:      FLUoxetine (PROZAC) 40 MG capsule, Take 1 capsule (40 mg) by mouth daily, Disp: 30 capsule, Rfl: 1     levonorgestrel-ethinyl estradiol (SEASONALE) 0.15-0.03 MG tablet, Take 1 tablet by mouth daily, Disp: 91 tablet, Rfl: 3     Allergies   Allergen Reactions     Ibuprofen Other (See Comments)     Bleeding?  Per chart negative for Von Willebrand's x 3     Chicken-Derived Products (Egg)      Other reaction(s): GI Upset       Family History   Problem Relation Age of Onset     Arthritis Mother      Diabetes Maternal Grandmother      Colon Cancer Maternal Grandmother         passed from - around 80 years old     Arthritis Maternal Grandmother      Cerebrovascular Disease Maternal Grandmother      Coronary Artery Disease Maternal Grandmother      Diabetes Maternal Grandfather      Cerebrovascular Disease  Maternal Grandfather      Coronary Artery Disease Maternal Grandfather      Cerebrovascular Disease Paternal Grandmother      Diabetes Paternal Grandmother      Coronary Artery Disease Paternal Grandmother      Cerebrovascular Disease Paternal Grandfather      Diabetes Paternal Grandfather      Coronary Artery Disease Paternal Grandfather      Breast Cancer No family hx of      Thyroid Disease No family hx of        OBJECTIVE  Vitals:    05/17/23 1418   BP: (!) 150/82   Pulse: 76   SpO2: 98%     Constitutional: Well developed, well nourished, appears stated age. No acute distress.  Gait is normal and steady  HEENT: Head atraumatic, normocephalic. Eyes without conjunctival injection or jaundice. Neck supple.   Skin: No obvious rash, lesions, or petechiae of exposed skin.   Extremities: Peripheral pulses intact. No clubbing, cyanosis, or edema. Moves all extremities.  Psychiatric/mental status: Alert, without lethargy or stupor. Speech fluent. Appropriate affect. Mood normal. Able to follow commands without difficulty.   Musculoskeletal exam: Normal bulk and tone. Unremarkable spinal curvature.     Lumbar/Thoracic spine:   ROM: flexion 90 degrees and extension 20 degrees  Rotation/ext to right: pain free  Rotation/ext to left: pain free  Myofascial tenderness: lower thoracic paraspinals  Focal tenderness: No SI joint, gluteal, piriformis tenderness; mild GT tenderness   Normal 5/5 LE strength bilaterally  Normal sensation to light touch in the lower extremities bilaterally   No allodynia, dysesthesia, or hyperalgesia in the lower extremities bilaterally  Reflexes: Lower extremity reflexes within normal limits bilaterally  No ankle clonus bilaterally  Straight leg raise: Negative bilaterally    Wrists with full ROM. Tinel's negative on the right. Prolonged xtension of the right wrist causes mild tingling in the 3rd and fourth finger tips.     ASSESSMENT AND PLAN:  Myalgia, multiple sites  Chronic low back pain without  sciatica, unspecified back pain laterality  Has had no treatment as of yet for back pain x 2 years. Recommend starting with PT, she prefers a Pain PT approach as this was much more helpful for her in the past. Exam is normal with the exception of thoracic paraspinal tenderness and mild GT tenderness to palpation. No imaging indicated at this time. Okay to restart gabapentin as this was helpful in the past.   - PAIN PT EVAL AND TREAT  - gabapentin (NEURONTIN) 300 MG capsule  Dispense: 90 capsule; Refill: 1      Tendonitis of wrist, right  Reoccurant right wrist tendonitis, likely from overuse. Had in the past, resolved with rest. Same symptoms as previously had in 2017, negative right UE EMG.  - Occupational Therapy Referral    Follow up with me in 4-8 weeks to reassess symptoms and response to treatment.       Miladys Barrientos, CNP-BC, PMGT-BC, AP-PMN  [facility], [place]          BILLING TIME DOCUMENTATION:   The total TIME spent on this patient on the date of the encounter/appointment was 76 minutes.      TOTAL TIME includes:   Time spent preparing to see the patient by reviewing available medical information in the patient's medical record, including relevant provider notes, laboratory work, and imaging 8 minutes  Time spent face to face (or over the phone) with the patient 41 minutes  Time spent ordering tests, medications, procedures and referrals 2 minutes  Time spent Referring and communicating with other healthcare professionals 0 minutes  Time spent documenting clinical information in Epic 15 minutes

## 2023-05-17 NOTE — PATIENT INSTRUCTIONS
For wrist pain - see Hand Therapy.  For back pain- see Pain Physical therapy.  Restart gabapentin. Start with one capsule at bedtime, then in 3-5 days, increase to one capsule in the morning and one at bedtime. After 3-5 days, increase dose to 1 capsule in the morning, one mid afternoon and one at bedtime.  Follow-up in 4-8 weeks to recheck on symptoms.   _____________________________________________________  Scheduling/Clinic telephone number for ALL locations:  [phone]    After Hours On-Call Service for Emergencies:  [phone]  Call with any questions about your care and for scheduling assistance.   Calls are returned Monday through Friday between 8 AM and 4:00 PM. We usually get back to you within 2-3 business days depending on the issue/request. I am not in the clinic on Fridays.   If we are prescribing your medications:  For medication refills, call the clinic or send a [employer] message 7 days in advance.  Please include the name of the requested medication and your preferred pharmacy. Please allow 3-4 days to be processed.   Per [place] State Law, all controlled substance prescriptions must be filled within 30 days of being written. For those controlled substances allowing refills, pickup must occur within 30 days of last fill.    We believe regular attendance is key to your success in our program!    If you are unable to keep your appointment we ask that you call us at least 24 hours in advance to cancel.This will allow us to offer the appointment time to another patient. Multiple missed appointments may lead to dismissal from the clinic.

## 2023-06-28 ENCOUNTER — TELEPHONE (OUTPATIENT)
Dept: FAMILY MEDICINE | Facility: CLINIC | Age: 28
End: 2023-06-28
Payer: COMMERCIAL

## 2023-06-28 NOTE — TELEPHONE ENCOUNTER
"Patient asks if she needs an appointment to discuss the need to increase in her FLUoxetine (PROZAC) 40 MG capsule    When patient was asked for the need for increase, she stated \" Work life. \"      Patient number [phone]  "

## 2023-06-29 ASSESSMENT — ANXIETY QUESTIONNAIRES
3. WORRYING TOO MUCH ABOUT DIFFERENT THINGS: NOT AT ALL
2. NOT BEING ABLE TO STOP OR CONTROL WORRYING: NOT AT ALL
GAD7 TOTAL SCORE: 2
1. FEELING NERVOUS, ANXIOUS, OR ON EDGE: SEVERAL DAYS
5. BEING SO RESTLESS THAT IT IS HARD TO SIT STILL: NOT AT ALL
GAD7 TOTAL SCORE: 2
IF YOU CHECKED OFF ANY PROBLEMS ON THIS QUESTIONNAIRE, HOW DIFFICULT HAVE THESE PROBLEMS MADE IT FOR YOU TO DO YOUR WORK, TAKE CARE OF THINGS AT HOME, OR GET ALONG WITH OTHER PEOPLE: NOT DIFFICULT AT ALL
4. TROUBLE RELAXING: NOT AT ALL
7. FEELING AFRAID AS IF SOMETHING AWFUL MIGHT HAPPEN: NOT AT ALL
7. FEELING AFRAID AS IF SOMETHING AWFUL MIGHT HAPPEN: NOT AT ALL
6. BECOMING EASILY ANNOYED OR IRRITABLE: SEVERAL DAYS
8. IF YOU CHECKED OFF ANY PROBLEMS, HOW DIFFICULT HAVE THESE MADE IT FOR YOU TO DO YOUR WORK, TAKE CARE OF THINGS AT HOME, OR GET ALONG WITH OTHER PEOPLE?: NOT DIFFICULT AT ALL

## 2023-06-29 NOTE — TELEPHONE ENCOUNTER
Left message to call back. MyChart message sent to patient advising she would need an appointment for medication increase.

## 2023-06-30 ENCOUNTER — VIRTUAL VISIT (OUTPATIENT)
Dept: FAMILY MEDICINE | Facility: CLINIC | Age: 28
End: 2023-06-30
Payer: COMMERCIAL

## 2023-06-30 DIAGNOSIS — F33.1 MODERATE EPISODE OF RECURRENT MAJOR DEPRESSIVE DISORDER (H): Primary | ICD-10-CM

## 2023-06-30 PROCEDURE — 99213 OFFICE O/P EST LOW 20 MIN: CPT | Mod: 95 | Performed by: NURSE PRACTITIONER

## 2023-06-30 RX ORDER — FLUOXETINE 40 MG/1
80 CAPSULE ORAL DAILY
Qty: 180 CAPSULE | Refills: 1 | Status: SHIPPED | OUTPATIENT
Start: 2023-06-30 | End: 2023-11-30

## 2023-06-30 ASSESSMENT — PATIENT HEALTH QUESTIONNAIRE - PHQ9
SUM OF ALL RESPONSES TO PHQ QUESTIONS 1-9: 3
SUM OF ALL RESPONSES TO PHQ QUESTIONS 1-9: 3
10. IF YOU CHECKED OFF ANY PROBLEMS, HOW DIFFICULT HAVE THESE PROBLEMS MADE IT FOR YOU TO DO YOUR WORK, TAKE CARE OF THINGS AT HOME, OR GET ALONG WITH OTHER PEOPLE: NOT DIFFICULT AT ALL

## 2023-06-30 ASSESSMENT — ANXIETY QUESTIONNAIRES: GAD7 TOTAL SCORE: 2

## 2023-06-30 NOTE — PROGRESS NOTES
"Rayray is a 28 year old who is being evaluated via a billable telephone visit.      What phone number would you like to be contacted at? [phone]  How would you like to obtain your AVS? Sarahi  Distant Location (provider location):  On-site    Assessment & Plan     Moderate episode of recurrent major depressive disorder (H)  Increase dose to 80 mg daily. Follow up for phq9 in 1 month.  - FLUoxetine (PROZAC) 40 MG capsule  Dispense: 180 capsule; Refill: 1      Prescription drug management         BMI:   Estimated body mass index is 35.3 kg/m  as calculated from the following:    Height as of 10/3/22: 1.575 m (5' 2\").    Weight as of 10/3/22: 87.5 kg (193 lb).         Milana Roman [facility]   Rayray is a 28 year old, presenting for the following health issues:  Recheck Medication and Consult (Up dose my antidepressant medication)        6/30/2023    11:25 AM   Additional Questions   Roomed by Collette Zapata   Accompanied by Self     History of Present Illness       Mental Health Follow-up:  Patient presents to follow-up on Depression & Anxiety.Patient's depression since last visit has been:  Good  The patient is not having other symptoms associated with depression.  Patient's anxiety since last visit has been:  Good  The patient is not having other symptoms associated with anxiety.  Any significant life events: job concerns  Patient is not feeling anxious or having panic attacks.  Patient has no concerns about alcohol or drug use.    She eats 0-1 servings of fruits and vegetables daily.She consumes 1 sweetened beverage(s) daily.She exercises with enough effort to increase her heart rate 9 or less minutes per day.  She exercises with enough effort to increase her heart rate 3 or less days per week. She is missing 1 dose(s) of medications per week.  Today's KWAKU-7 Score: 2     Fluoxetine has worked pretty good in past   Work stress is making things worse.         Review " of Systems   Constitutional, HEENT, cardiovascular, pulmonary, GI, , musculoskeletal, neuro, skin, endocrine and psych systems are negative, except as otherwise noted.      Objective           Vitals:  No vitals were obtained today due to virtual visit.    Physical Exam   healthy, alert and no distress  PSYCH: Alert and oriented times 3; coherent speech, normal   rate and volume, able to articulate logical thoughts, able   to abstract reason, no tangential thoughts, no hallucinations   or delusions  Her affect is normal  RESP: No cough, no audible wheezing, able to talk in full sentences  Remainder of exam unable to be completed due to telephone visits            Phone call duration: 6 minutes

## 2023-07-11 ENCOUNTER — THERAPY VISIT (OUTPATIENT)
Dept: PHYSICAL THERAPY | Facility: CLINIC | Age: 28
End: 2023-07-11
Attending: NURSE PRACTITIONER
Payer: COMMERCIAL

## 2023-07-11 DIAGNOSIS — M54.50 LUMBAGO: ICD-10-CM

## 2023-07-11 PROCEDURE — 97161 PT EVAL LOW COMPLEX 20 MIN: CPT | Mod: GP | Performed by: PHYSICAL THERAPIST

## 2023-07-11 PROCEDURE — 97110 THERAPEUTIC EXERCISES: CPT | Mod: GP | Performed by: PHYSICAL THERAPIST

## 2023-07-11 PROCEDURE — 97112 NEUROMUSCULAR REEDUCATION: CPT | Mod: GP | Performed by: PHYSICAL THERAPIST

## 2023-07-11 NOTE — PROGRESS NOTES
"PHYSICAL THERAPY EVALUATION  Type of Visit: Evaluation    See electronic medical record for Abuse and Falls Screening details.    Subjective      Presenting condition or subjective complaint: Lower back pain  Date of onset: 05/17/23    Prior diagnostic imaging/testing results: MRI     Prior therapy history for the same diagnosis, illness or injury: No        Living Environment  Social support: With family members   Type of home: [facility]   Stairs to enter the home: Yes 36 Is there a railing: Yes   Ramp: No   Stairs inside the home: Yes       Help at home: None    Employment: Yes Housekeeping and   Hobbies/Interests: Playing video games and crocheting    Patient goals for therapy:  walk longer, tolerate work duties better, have more energy and less fatigue    Pain assessment: Pain present, current 9/10, range 5-10/10     Objective   LUMBAR SPINE EVALUATION  Assumes slouched sitting posture with fwd head, rounded shldrs  Gait exhibits min arms swing, trunk rotation with decr B hip ext, toe off  AROM: Lumbar flex 75%, ext 50%.  B hips with tightness at end range hip flexion and WFL for hip ext  Able to perform heel walk, toe walk.   SLS 3\" on R and L and shaky    Assessment & Plan   CLINICAL IMPRESSIONS   Medical Diagnosis: Lumbago, myalgia    Treatment Diagnosis: Lumbago, myalgia   Impression/Assessment: Patient is a 28 year old female with lumbago, B hip pain and general myalgia in back, hips and upper legs complaints.  The following significant findings have been identified: Pain, Decreased ROM/flexibility, Decreased strength, Impaired gait, Impaired muscle performance, Decreased activity tolerance and Impaired posture. These impairments interfere with their ability to perform work tasks, recreational activities, household chores, driving , household mobility and community mobility as compared to previous level of function.     Clinical Decision Making (Complexity):   Clinical Presentation: " Stable/Uncomplicated  Clinical Presentation Rationale: based on medical and personal factors listed in PT evaluation  Clinical Decision Making (Complexity): Low complexity    PLAN OF CARE  Treatment Interventions:  Interventions: Neuromuscular Re-education, Therapeutic Activity, Therapeutic Exercise, Self-Care/Home Management    Long Term Goals     PT Goal 1  Goal Identifier: ambulation  Goal Description: able to ambulate 20 minutes, pain 7/10  Rationale: to maximize safety and independence with performance of ADLs and functional tasks;to maximize safety and independence within the home;to maximize safety and independence within the community;to maximize safety and independence with self cares  Target Date: 11/11/23      Frequency of Treatment: 2x month x 4 months  Duration of Treatment: 4 months    Education Assessment:        Risks and benefits of evaluation/treatment have been explained.   Patient/Family/caregiver agrees with Plan of Care.     Evaluation Time:     PT Eval, Low Complexity Minutes (21007): 35    Signing Clinician: Henry Peña PT

## 2023-07-20 ENCOUNTER — OFFICE VISIT (OUTPATIENT)
Dept: PALLIATIVE MEDICINE | Facility: CLINIC | Age: 28
End: 2023-07-20
Attending: NURSE PRACTITIONER
Payer: COMMERCIAL

## 2023-07-20 VITALS — DIASTOLIC BLOOD PRESSURE: 76 MMHG | HEART RATE: 68 BPM | OXYGEN SATURATION: 100 % | SYSTOLIC BLOOD PRESSURE: 123 MMHG

## 2023-07-20 DIAGNOSIS — M79.18 MYALGIA, MULTIPLE SITES: Primary | ICD-10-CM

## 2023-07-20 DIAGNOSIS — M77.8 TENDONITIS OF WRIST, RIGHT: ICD-10-CM

## 2023-07-20 DIAGNOSIS — M54.50 CHRONIC LOW BACK PAIN WITHOUT SCIATICA, UNSPECIFIED BACK PAIN LATERALITY: ICD-10-CM

## 2023-07-20 DIAGNOSIS — G89.29 CHRONIC LOW BACK PAIN WITHOUT SCIATICA, UNSPECIFIED BACK PAIN LATERALITY: ICD-10-CM

## 2023-07-20 PROCEDURE — 99214 OFFICE O/P EST MOD 30 MIN: CPT | Performed by: NURSE PRACTITIONER

## 2023-07-20 RX ORDER — CELECOXIB 100 MG/1
100 CAPSULE ORAL 2 TIMES DAILY PRN
Qty: 60 CAPSULE | Refills: 1 | Status: SHIPPED | OUTPATIENT
Start: 2023-07-20 | End: 2024-06-27

## 2023-07-20 ASSESSMENT — PAIN SCALES - PAIN ENJOYMENT GENERAL ACTIVITY SCALE (PEG)
INTERFERED_GENERAL_ACTIVITY: 9
AVG_PAIN_PASTWEEK: 8
PEG_TOTALSCORE: 8.33
INTERFERED_ENJOYMENT_LIFE: 8

## 2023-07-20 ASSESSMENT — PAIN SCALES - GENERAL: PAINLEVEL: EXTREME PAIN (9)

## 2023-07-20 NOTE — PROGRESS NOTES
[facility]     Date of Visit: 7/20/2023  Last visit: 5/17/2023  Original Consult: 2/2/21 (Patrick)    Reason for visit:  Consultation/ Transfer of Care:: Rayray Contreras is a 28 year old female with PMH significant for depression, PCOS who is seen today at the request of Milana Roman for evaluation of her pain issues and recommendations for management. Rayray has been seen at a pain clinic in the past by Dr. Nguyen  Originally referred for this consultation on 10/10/2022, has cancelled 4 previously scheduled visits.     History:   1. Lower back and mid back pain: Started in 2/2/21, was seen by Dr. Nguyen  for chronic upper back pain after work related injury in 2020. TPIs ordered, helpful. Had also used gabapentin, was tapered off as pain improved.     2. Hand pain: Right 3rd and 4th fingers get numb after she sleeps. 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 She also notices this when lying on her stomach while playing video games. Tries to move her fingers, this usually resolves the pain. Sometime notices that she has more symptoms of this when lying on her right side too long.     Recommendations from last visit:  Myalgia, multiple sites  Chronic low back pain without sciatica, unspecified back pain laterality  Has had no treatment as of yet for back pain x 2 years. Recommend starting with PT, she prefers a Pain PT approach as this was much more helpful for her in the past. Exam is normal with the exception of thoracic paraspinal tenderness and mild GT tenderness to palpation. No imaging indicated at this time. Okay to restart gabapentin as this was helpful in the past.   - PAIN PT EVAL AND TREAT  - gabapentin (NEURONTIN) 300 MG capsule  Dispense: 90 capsule; Refill: 1  Tendonitis of wrist, right  Reoccurant right wrist tendonitis, likely from overuse. Had in the past, resolved with rest. Same symptoms as previously had in 2017, negative right UE EMG.  - Occupational  Therapy Referral    ____________________________________________________________    Since last seen, Rayray reports:  -  Takes one in the morning only. Has a hard time remebering to take pills, so only is taking 300 mg of gabapentin in the morning.   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 Met with Gilberto for evaluation for pain PT, she is working on posture and breathing.   - Still has hand pain, occurs every day, does not bother her much, Planning on getting a wrist brace today. Did not follow through with hand therapy.   - Lower back pain is bothering her the most. This is worst with bending to scrub toilets or showers. Also has at times when she is walking. Has been increasing her physical activities and trying to take the stairs more, walk more and do regular exercise using the Wii fit video game.   - Does not sleep well, not really sure if this is due to pain or not. Wakes up at 3 am and cannot go back to sleep. Tried many different mattress toppers to help with her lower back pain.   - had nosebleeds in the past when she took high dose ibuprofen for headaches. Has not tried other NSAIDs.   - Crocheting a blanket, taught herself with Internet instructional videos  Pain description:  Location:     Quality: sharp, aching pain (lower back), sharp pain when it goes down her legs.   Duration: Constant (lower back pain)  Severity/Intensity (0 = No pain to 10 = Worst pain imaginable)   Now: 10, Average: 8, Best: 7, Worst: 7  Aggravating factors include: exercise  Relieving factors include: lying down, sitting, relaxation    Current pain medications:  acetaminophen - somewhat helpful for headaches.      Review of [place] Prescription Monitoring Program ():   No concern for abuse or misuse of controlled medications based on this report. Viewed on 5/17/2023 No controlled medications are being prescribed.    PAIN MANAGEMENT TREATMENT HISTORY  1.  MEDICATIONS:  Opiates: not tried  NSAIDS:Ibuprofen, but had bloody noses when she took this   Muscle Relaxants: cyclobenzaprine- made her feel sleepy, robaxin - unsure if it was helpful  Anti-depressants: Bupropion, prozac  Neuropathics: Gabapentin: helpful in the past, stopped taking it after upper back pain resolved.   Topicals: icy hot patches -helpful, has only used one time  Adjuvant pain medications: acetaminophen  2. PHYSICAL THERAPY:    4/12/21 & 4/15/21: Pain PT with Yadira Padron.   Traditional spine PT prior to that - worsened lower back pain  3. PAIN PSYCHOLOGY:   Has not tried  4. SURGERY:   No pain related surgeries  5. INJECTIONS:   5/13/21: TPIs with Dr. Nguyen  4/15/21: TPIs with Dr. Nguyen  3/4/21: TPIs with Dr. Nguyen  12/03/2020: T6-T7 interlaminar epidural steroid injection with Dr. Benedict -not helpful  6. COMPLEMENTARY THERAPY:  Chiropractic: Has not tried  Acupuncture/acupressure:Has not tried  TENS unit:Has not tried  heat/heat packs:Tried, helpful uses heated seats and hot showers  cold/cold packs:Tried, not helpful    Imaging:  Thoracic MRI was done on 10/28/2020  T6-T7, there is a right central disc herniation which mildly indents the right ventral aspect of the cord without cord signal change. No other significant disc herniation identified    7/12/2017: EMG right UE: normal result    Social History:  Social History     Tobacco Use     Smoking status: Never     Smokeless tobacco: Never   Vaping Use     Vaping Use: Never used   Substance Use Topics     Alcohol use: No     Comment: once every few months      Drug use: No      Social History     Social History Narrative    Full time  at [email or web address],  [employer] part time    Likes to play video games, lj for fun.     Lives with her mom and her sister in a townhouse.     Tries to walk for exercise.    Last updated 5/17/2023      Medications and Allergies reviewed.    OBJECTIVE  Vitals:    07/20/23 1543    BP: 123/76   Pulse: 68   SpO2: 100%     Constitutional: Well developed, well nourished, appears stated age. No acute distress.  Gait is normal and steady  HEENT: Head atraumatic, normocephalic. Eyes without conjunctival injection or jaundice. Neck supple.   Skin: No obvious rash, lesions, or petechiae of exposed skin.   Extremities: Peripheral pulses intact. No clubbing, cyanosis, or edema. Moves all extremities.  Psychiatric/mental status: Alert, without lethargy or stupor. Speech fluent. Appropriate affect. Mood normal. Able to follow commands without difficulty.   Musculoskeletal exam: Normal bulk and tone. Unremarkable spinal curvature.     ASSESSMENT AND PLAN:  Myalgia, multiple sites  Chronic low back pain without sciatica, unspecified back pain laterality  Continue Pain PT approach. Stop gabapentin. Try celebrex 100 mg in the morning, okay to repeat at the end of the day if needed.  No imaging indicated at this time.      Tendonitis of wrist, right  Reoccurant right wrist tendonitis, likely from overuse. Had in the past, resolved with rest. No additional testing indicated at this time, would recommend trial of bracing first.     Follow up with me in 6-8 weeks to reassess symptoms and response to treatment.       Miladys Barrientos, CNP-BC, PMGT-BC, AP-PMN  [facility]

## 2023-07-20 NOTE — PATIENT INSTRUCTIONS
Stop gabapentin  Try Celebrex 1 capsule in the morning.  Try brace for her right wrist.   Follow-up in 6-8 weeks to check in again  _____________________________________________________  Scheduling/Clinic telephone number for ALL locations:  [phone]    After Hours On-Call Service for Emergencies:  [phone]  Call with any questions about your care and for scheduling assistance.   Calls are returned Monday through Friday between 8 AM and 4:00 PM. We usually get back to you within 2-3 business days depending on the issue/request. I am not in the clinic on Fridays.   If we are prescribing your medications:  For medication refills, call the clinic or send a [employer] message 7 days in advance.  Please include the name of the requested medication and your preferred pharmacy. Please allow 3-4 days to be processed.   Per [place] State Law, all controlled substance prescriptions must be filled within 30 days of being written. For those controlled substances allowing refills, pickup must occur within 30 days of last fill.    We believe regular attendance is key to your success in our program!    If you are unable to keep your appointment we ask that you call us at least 24 hours in advance to cancel.This will allow us to offer the appointment time to another patient. Multiple missed appointments may lead to dismissal from the clinic.

## 2023-07-20 NOTE — NURSING NOTE
5/17/2023     2:16 PM 7/20/2023     3:43 PM   PEG Score   PEG Total Score 8 9       ALDAIR Caraballo [facility] [place]

## 2023-09-25 DIAGNOSIS — E28.2 PCOS (POLYCYSTIC OVARIAN SYNDROME): ICD-10-CM

## 2023-09-25 RX ORDER — LEVONORGESTREL AND ETHINYL ESTRADIOL 0.15-0.03
1 KIT ORAL DAILY
Qty: 91 TABLET | Refills: 0 | Status: SHIPPED | OUTPATIENT
Start: 2023-09-25 | End: 2023-11-30

## 2023-09-25 NOTE — TELEPHONE ENCOUNTER
Patient does have an appt scheduled for Oct. 16th already.  Needs a refil, sent to  refills.    Kayla MADSEN

## 2023-11-29 ASSESSMENT — ENCOUNTER SYMPTOMS
COUGH: 0
FREQUENCY: 0
FEVER: 0
DIZZINESS: 0
NERVOUS/ANXIOUS: 0
ARTHRALGIAS: 0
JOINT SWELLING: 0
SHORTNESS OF BREATH: 0
NAUSEA: 0
ABDOMINAL PAIN: 0
BREAST MASS: 0
CONSTIPATION: 0
HEADACHES: 0
MYALGIAS: 0
DIARRHEA: 0
HEARTBURN: 0
WEAKNESS: 0
SORE THROAT: 0
PARESTHESIAS: 0
HEMATURIA: 0
DYSURIA: 0
EYE PAIN: 0
CHILLS: 0
PALPITATIONS: 0
HEMATOCHEZIA: 0

## 2023-11-30 ENCOUNTER — OFFICE VISIT (OUTPATIENT)
Dept: FAMILY MEDICINE | Facility: CLINIC | Age: 28
End: 2023-11-30
Payer: COMMERCIAL

## 2023-11-30 VITALS
OXYGEN SATURATION: 99 % | WEIGHT: 195 LBS | SYSTOLIC BLOOD PRESSURE: 126 MMHG | HEIGHT: 62 IN | RESPIRATION RATE: 12 BRPM | TEMPERATURE: 97.9 F | HEART RATE: 69 BPM | DIASTOLIC BLOOD PRESSURE: 74 MMHG | BODY MASS INDEX: 35.88 KG/M2

## 2023-11-30 DIAGNOSIS — Z00.00 ROUTINE GENERAL MEDICAL EXAMINATION AT A HEALTH CARE FACILITY: Primary | ICD-10-CM

## 2023-11-30 DIAGNOSIS — F33.1 MODERATE EPISODE OF RECURRENT MAJOR DEPRESSIVE DISORDER (H): ICD-10-CM

## 2023-11-30 DIAGNOSIS — Z13.0 SCREENING FOR DEFICIENCY ANEMIA: ICD-10-CM

## 2023-11-30 DIAGNOSIS — Z13.220 SCREENING CHOLESTEROL LEVEL: ICD-10-CM

## 2023-11-30 DIAGNOSIS — Z13.21 ENCOUNTER FOR VITAMIN DEFICIENCY SCREENING: ICD-10-CM

## 2023-11-30 DIAGNOSIS — Z13.29 THYROID DISORDER SCREENING: ICD-10-CM

## 2023-11-30 DIAGNOSIS — E28.2 PCOS (POLYCYSTIC OVARIAN SYNDROME): ICD-10-CM

## 2023-11-30 DIAGNOSIS — Z13.1 SCREENING FOR DIABETES MELLITUS: ICD-10-CM

## 2023-11-30 LAB
ERYTHROCYTE [DISTWIDTH] IN BLOOD BY AUTOMATED COUNT: 12.7 % (ref 10–15)
HCT VFR BLD AUTO: 37.6 % (ref 35–47)
HGB BLD-MCNC: 12.4 G/DL (ref 11.7–15.7)
MCH RBC QN AUTO: 32.1 PG (ref 26.5–33)
MCHC RBC AUTO-ENTMCNC: 33 G/DL (ref 31.5–36.5)
MCV RBC AUTO: 97 FL (ref 78–100)
PLATELET # BLD AUTO: 269 10E3/UL (ref 150–450)
RBC # BLD AUTO: 3.86 10E6/UL (ref 3.8–5.2)
WBC # BLD AUTO: 9.8 10E3/UL (ref 4–11)

## 2023-11-30 PROCEDURE — 36415 COLL VENOUS BLD VENIPUNCTURE: CPT | Performed by: NURSE PRACTITIONER

## 2023-11-30 PROCEDURE — 96127 BRIEF EMOTIONAL/BEHAV ASSMT: CPT | Performed by: NURSE PRACTITIONER

## 2023-11-30 PROCEDURE — 80061 LIPID PANEL: CPT | Performed by: NURSE PRACTITIONER

## 2023-11-30 PROCEDURE — 99213 OFFICE O/P EST LOW 20 MIN: CPT | Mod: 25 | Performed by: NURSE PRACTITIONER

## 2023-11-30 PROCEDURE — 80053 COMPREHEN METABOLIC PANEL: CPT | Performed by: NURSE PRACTITIONER

## 2023-11-30 PROCEDURE — 82306 VITAMIN D 25 HYDROXY: CPT | Performed by: NURSE PRACTITIONER

## 2023-11-30 PROCEDURE — 99395 PREV VISIT EST AGE 18-39: CPT | Performed by: NURSE PRACTITIONER

## 2023-11-30 PROCEDURE — 85027 COMPLETE CBC AUTOMATED: CPT | Performed by: NURSE PRACTITIONER

## 2023-11-30 PROCEDURE — 84443 ASSAY THYROID STIM HORMONE: CPT | Performed by: NURSE PRACTITIONER

## 2023-11-30 RX ORDER — FLUOXETINE 40 MG/1
80 CAPSULE ORAL DAILY
Qty: 180 CAPSULE | Refills: 3 | Status: SHIPPED | OUTPATIENT
Start: 2023-11-30 | End: 2024-06-27

## 2023-11-30 RX ORDER — LEVONORGESTREL AND ETHINYL ESTRADIOL 0.15-0.03
1 KIT ORAL DAILY
Qty: 91 TABLET | Refills: 3 | Status: SHIPPED | OUTPATIENT
Start: 2023-11-30

## 2023-11-30 ASSESSMENT — ENCOUNTER SYMPTOMS
FREQUENCY: 0
WEAKNESS: 0
PARESTHESIAS: 0
ARTHRALGIAS: 0
DIARRHEA: 0
JOINT SWELLING: 0
HEMATOCHEZIA: 0
HEMATURIA: 0
PALPITATIONS: 0
DIZZINESS: 0
SHORTNESS OF BREATH: 0
HEADACHES: 0
NAUSEA: 0
ABDOMINAL PAIN: 0
FEVER: 0
DYSURIA: 0
EYE PAIN: 0
CONSTIPATION: 0
HEARTBURN: 0
SORE THROAT: 0
COUGH: 0
NERVOUS/ANXIOUS: 0
CHILLS: 0
BREAST MASS: 0
MYALGIAS: 0

## 2023-11-30 ASSESSMENT — ANXIETY QUESTIONNAIRES
1. FEELING NERVOUS, ANXIOUS, OR ON EDGE: SEVERAL DAYS
GAD7 TOTAL SCORE: 1
5. BEING SO RESTLESS THAT IT IS HARD TO SIT STILL: NOT AT ALL
IF YOU CHECKED OFF ANY PROBLEMS ON THIS QUESTIONNAIRE, HOW DIFFICULT HAVE THESE PROBLEMS MADE IT FOR YOU TO DO YOUR WORK, TAKE CARE OF THINGS AT HOME, OR GET ALONG WITH OTHER PEOPLE: NOT DIFFICULT AT ALL
4. TROUBLE RELAXING: NOT AT ALL
GAD7 TOTAL SCORE: 1
3. WORRYING TOO MUCH ABOUT DIFFERENT THINGS: NOT AT ALL
7. FEELING AFRAID AS IF SOMETHING AWFUL MIGHT HAPPEN: NOT AT ALL
8. IF YOU CHECKED OFF ANY PROBLEMS, HOW DIFFICULT HAVE THESE MADE IT FOR YOU TO DO YOUR WORK, TAKE CARE OF THINGS AT HOME, OR GET ALONG WITH OTHER PEOPLE?: NOT DIFFICULT AT ALL
GAD7 TOTAL SCORE: 1
2. NOT BEING ABLE TO STOP OR CONTROL WORRYING: NOT AT ALL
6. BECOMING EASILY ANNOYED OR IRRITABLE: NOT AT ALL
7. FEELING AFRAID AS IF SOMETHING AWFUL MIGHT HAPPEN: NOT AT ALL

## 2023-11-30 ASSESSMENT — PAIN SCALES - GENERAL: PAINLEVEL: NO PAIN (0)

## 2023-11-30 ASSESSMENT — PATIENT HEALTH QUESTIONNAIRE - PHQ9
SUM OF ALL RESPONSES TO PHQ QUESTIONS 1-9: 3
10. IF YOU CHECKED OFF ANY PROBLEMS, HOW DIFFICULT HAVE THESE PROBLEMS MADE IT FOR YOU TO DO YOUR WORK, TAKE CARE OF THINGS AT HOME, OR GET ALONG WITH OTHER PEOPLE: NOT DIFFICULT AT ALL
SUM OF ALL RESPONSES TO PHQ QUESTIONS 1-9: 3

## 2023-11-30 NOTE — PROGRESS NOTES
SUBJECTIVE:   Rayray is a 28 year old, presenting for the following:  Physical        11/30/2023     3:37 PM   Additional Questions   Roomed by MARTHA SILVERMAN   Accompanied by SELF       Healthy Habits:     Getting at least 3 servings of Calcium per day:  NO    Bi-annual eye exam:  NO    Dental care twice a year:  NO    Sleep apnea or symptoms of sleep apnea:  Daytime drowsiness    Diet:  Regular (no restrictions)    Frequency of exercise:  6-7 days/week    Duration of exercise:  Other    Taking medications regularly:  No    Barriers to taking medications:  Problems remembering to take them    Medication side effects:  None    Additional concerns today:  Yes      Today's PHQ-9 Score:       11/30/2023     3:34 PM   PHQ-9 SCORE   PHQ-9 Total Score MyChart 3 (Minimal depression)   PHQ-9 Total Score 3       Depression Followup  How are you doing with your depression since your last visit? Improved   Have you had a significant life event?  No   Are you feeling anxious or having panic attacks?   No  Do you have any concerns with your use of alcohol or other drugs? No    Social History     Tobacco Use    Smoking status: Never    Smokeless tobacco: Never   Vaping Use    Vaping Use: Never used   Substance Use Topics    Alcohol use: No     Comment: once every few months     Drug use: No         4/2/2023    12:50 PM 6/30/2023     4:04 PM 11/30/2023     3:34 PM   PHQ   PHQ-9 Total Score 5 3 3   Q9: Thoughts of better off dead/self-harm past 2 weeks Not at all Not at all Not at all         5/15/2023    12:42 PM 6/29/2023     6:00 PM 11/30/2023     3:34 PM   KWAKU-7 SCORE   Total Score 3 (minimal anxiety) 2 (minimal anxiety) 1 (minimal anxiety)   Total Score 3 2 1         11/30/2023     3:34 PM   Last PHQ-9   1.  Little interest or pleasure in doing things 0   2.  Feeling down, depressed, or hopeless 1   3.  Trouble falling or staying asleep, or sleeping too much 1   4.  Feeling tired or having little energy 1   5.  Poor appetite or  overeating 0   6.  Feeling bad about yourself 0   7.  Trouble concentrating 0   8.  Moving slowly or restless 0   Q9: Thoughts of better off dead/self-harm past 2 weeks 0   PHQ-9 Total Score 3         11/30/2023     3:34 PM   KWAKU-7    1. Feeling nervous, anxious, or on edge 1   2. Not being able to stop or control worrying 0   3. Worrying too much about different things 0   4. Trouble relaxing 0   5. Being so restless that it is hard to sit still 0   6. Becoming easily annoyed or irritable 0   7. Feeling afraid, as if something awful might happen 0   KWAKU-7 Total Score 1   If you checked any problems, how difficult have they made it for you to do your work, take care of things at home, or get along with other people? Not difficult at all       Suicide Assessment Five-step Evaluation and Treatment (SAFE-T)        Social History     Tobacco Use    Smoking status: Never    Smokeless tobacco: Never   Substance Use Topics    Alcohol use: No     Comment: once every few months            11/29/2023     4:22 PM   Alcohol Use   Prescreen: >3 drinks/day or >7 drinks/week? No     Reviewed orders with patient.  Reviewed health maintenance and updated orders accordingly - Yes  Lab work is in process    Breast Cancer Screening:    FHS-7:       2/1/2021     6:03 PM 4/29/2021     7:36 PM 4/11/2022     6:27 PM 4/26/2022     5:58 PM 9/26/2022     7:26 AM 11/29/2023     4:24 PM   Breast CA Risk Assessment (FHS-7)   Did any of your first-degree relatives have breast or ovarian cancer? Yes Unknown No    No No    No Unknown No   Did any of your relatives have bilateral breast cancer? Unknown Yes Unknown    Unknown Unknown    Unknown Unknown No   Did any man in your family have breast cancer? No No No    No No    No Unknown No   Did any woman in your family have breast and ovarian cancer? No No No    No No    No Unknown No   Did any woman in your family have breast cancer before age 50 y? Unknown No No    No No    No Unknown No   Do you  "have 2 or more relatives with breast and/or ovarian cancer? Unknown No No    No No    No No No   Do you have 2 or more relatives with breast and/or bowel cancer? No No No    No No    No No No       Patient under 40 years of age: Routine Mammogram Screening not recommended.   Pertinent mammograms are reviewed under the imaging tab.    History of abnormal Pap smear: NO - age 30-65 PAP every 5 years with negative HPV co-testing recommended      10/3/2022     9:51 AM 5/15/2018     9:47 AM 10/13/2015    12:00 AM   PAP / HPV   PAP Negative for Intraepithelial Lesion or Malignancy (NILM)      PAP (Historical)  NIL  NIL      Reviewed and updated as needed this visit by clinical staff   Tobacco  Allergies  Meds              Reviewed and updated as needed this visit by Provider                     Review of Systems   Constitutional:  Negative for chills and fever.   HENT:  Negative for congestion, ear pain, hearing loss and sore throat.    Eyes:  Negative for pain and visual disturbance.   Respiratory:  Negative for cough and shortness of breath.    Cardiovascular:  Negative for chest pain, palpitations and peripheral edema.   Gastrointestinal:  Negative for abdominal pain, constipation, diarrhea, heartburn, hematochezia and nausea.   Breasts:  Negative for tenderness, breast mass and discharge.   Genitourinary:  Negative for dysuria, frequency, genital sores, hematuria, pelvic pain, urgency, vaginal bleeding and vaginal discharge.   Musculoskeletal:  Negative for arthralgias, joint swelling and myalgias.   Skin:  Negative for rash.   Neurological:  Negative for dizziness, weakness, headaches and paresthesias.   Psychiatric/Behavioral:  Negative for mood changes. The patient is not nervous/anxious.           OBJECTIVE:   /74   Pulse 69   Temp 97.9  F (36.6  C) (Tympanic)   Resp 12   Ht 1.575 m (5' 2\")   Wt 88.5 kg (195 lb)   LMP 10/31/2023 (Approximate)   SpO2 99%   BMI 35.67 kg/m    Physical Exam  GENERAL: " healthy, alert and no distress  EYES: Eyes grossly normal to inspection, PERRL and conjunctivae and sclerae normal  HENT: ear canals and TM's normal, nose and mouth without ulcers or lesions  NECK: no adenopathy, no asymmetry, masses, or scars and thyroid normal to palpation  RESP: lungs clear to auscultation - no rales, rhonchi or wheezes  BREAST: normal without masses, tenderness or nipple discharge and no palpable axillary masses or adenopathy  CV: regular rate and rhythm, normal S1 S2, no S3 or S4, no murmur, click or rub, no peripheral edema and peripheral pulses strong  ABDOMEN: soft, nontender, no hepatosplenomegaly, no masses and bowel sounds normal  MS: no gross musculoskeletal defects noted, no edema  SKIN: no suspicious lesions or rashes  NEURO: Normal strength and tone, mentation intact and speech normal  PSYCH: mentation appears normal, affect normal/bright    Diagnostic Test Results:  Labs reviewed in Epic    ASSESSMENT/PLAN:   Rayray was seen today for physical.    Diagnoses and all orders for this visit:    Routine general medical examination at a health care facility  -     PRIMARY CARE FOLLOW-UP SCHEDULING; Future  -     REVIEW OF HEALTH MAINTENANCE PROTOCOL ORDERS    Moderate episode of recurrent major depressive disorder (H)  -     FLUoxetine (PROZAC) 40 MG capsule; Take 2 capsules (80 mg) by mouth daily    PCOS (polycystic ovarian syndrome)  -     levonorgestrel-ethinyl estradiol (SEASONALE) 0.15-0.03 MG tablet; Take 1 tablet by mouth daily    Encounter for vitamin deficiency screening  -     Vitamin D Deficiency; Future  -     Vitamin D Deficiency    Screening cholesterol level  -     Lipid panel reflex to direct LDL Fasting; Future  -     Lipid panel reflex to direct LDL Fasting    Screening for deficiency anemia  -     CBC with platelets; Future  -     CBC with platelets    Screening for diabetes mellitus  -     Comprehensive metabolic panel (BMP + Alb, Alk Phos, ALT, AST, Total. Bili, TP);  Future  -     Comprehensive metabolic panel (BMP + Alb, Alk Phos, ALT, AST, Total. Bili, TP)    Thyroid disorder screening  -     TSH with free T4 reflex; Future  -     TSH with free T4 reflex        Patient has been advised of split billing requirements and indicates understanding: Yes      COUNSELING:  Reviewed preventive health counseling, as reflected in patient instructions        She reports that she has never smoked. She has never used smokeless tobacco.          Milana Roman, [facility]

## 2023-12-01 LAB
ALBUMIN SERPL BCG-MCNC: 4.6 G/DL (ref 3.5–5.2)
ALP SERPL-CCNC: 40 U/L (ref 40–150)
ALT SERPL W P-5'-P-CCNC: 33 U/L (ref 0–50)
ANION GAP SERPL CALCULATED.3IONS-SCNC: 12 MMOL/L (ref 7–15)
AST SERPL W P-5'-P-CCNC: 22 U/L (ref 0–45)
BILIRUB SERPL-MCNC: 0.4 MG/DL
BUN SERPL-MCNC: 11.1 MG/DL (ref 6–20)
CALCIUM SERPL-MCNC: 9.6 MG/DL (ref 8.6–10)
CHLORIDE SERPL-SCNC: 105 MMOL/L (ref 98–107)
CHOLEST SERPL-MCNC: 164 MG/DL
CREAT SERPL-MCNC: 0.64 MG/DL (ref 0.51–0.95)
DEPRECATED HCO3 PLAS-SCNC: 23 MMOL/L (ref 22–29)
EGFRCR SERPLBLD CKD-EPI 2021: >90 ML/MIN/1.73M2
GLUCOSE SERPL-MCNC: 68 MG/DL (ref 70–99)
HDLC SERPL-MCNC: 56 MG/DL
LDLC SERPL CALC-MCNC: 99 MG/DL
NONHDLC SERPL-MCNC: 108 MG/DL
POTASSIUM SERPL-SCNC: 4.1 MMOL/L (ref 3.4–5.3)
PROT SERPL-MCNC: 8 G/DL (ref 6.4–8.3)
SODIUM SERPL-SCNC: 140 MMOL/L (ref 135–145)
TRIGL SERPL-MCNC: 45 MG/DL
TSH SERPL DL<=0.005 MIU/L-ACNC: 1.16 UIU/ML (ref 0.3–4.2)
VIT D+METAB SERPL-MCNC: 16 NG/ML (ref 20–50)

## 2023-12-22 PROBLEM — M54.50 LUMBAGO: Status: RESOLVED | Noted: 2023-07-11 | Resolved: 2023-12-22

## 2024-06-25 SDOH — HEALTH STABILITY: PHYSICAL HEALTH: ON AVERAGE, HOW MANY MINUTES DO YOU ENGAGE IN EXERCISE AT THIS LEVEL?: 30 MIN

## 2024-06-25 SDOH — HEALTH STABILITY: PHYSICAL HEALTH: ON AVERAGE, HOW MANY DAYS PER WEEK DO YOU ENGAGE IN MODERATE TO STRENUOUS EXERCISE (LIKE A BRISK WALK)?: 5 DAYS

## 2024-06-25 ASSESSMENT — SOCIAL DETERMINANTS OF HEALTH (SDOH): HOW OFTEN DO YOU GET TOGETHER WITH FRIENDS OR RELATIVES?: NEVER

## 2024-06-26 ENCOUNTER — TELEPHONE (OUTPATIENT)
Dept: FAMILY MEDICINE | Facility: CLINIC | Age: 29
End: 2024-06-26

## 2024-06-27 ENCOUNTER — OFFICE VISIT (OUTPATIENT)
Dept: FAMILY MEDICINE | Facility: CLINIC | Age: 29
End: 2024-06-27
Payer: COMMERCIAL

## 2024-06-27 VITALS
TEMPERATURE: 99.3 F | WEIGHT: 205 LBS | HEART RATE: 84 BPM | SYSTOLIC BLOOD PRESSURE: 128 MMHG | HEIGHT: 61 IN | BODY MASS INDEX: 38.71 KG/M2 | OXYGEN SATURATION: 100 % | RESPIRATION RATE: 24 BRPM | DIASTOLIC BLOOD PRESSURE: 74 MMHG

## 2024-06-27 DIAGNOSIS — Z13.29 THYROID DISORDER SCREENING: ICD-10-CM

## 2024-06-27 DIAGNOSIS — E28.2 PCOS (POLYCYSTIC OVARIAN SYNDROME): ICD-10-CM

## 2024-06-27 DIAGNOSIS — M54.50 LOW BACK PAIN POTENTIALLY ASSOCIATED WITH RADICULOPATHY: Primary | ICD-10-CM

## 2024-06-27 DIAGNOSIS — Z13.0 SCREENING FOR DEFICIENCY ANEMIA: ICD-10-CM

## 2024-06-27 LAB
ERYTHROCYTE [DISTWIDTH] IN BLOOD BY AUTOMATED COUNT: 12 % (ref 10–15)
ERYTHROCYTE [SEDIMENTATION RATE] IN BLOOD BY WESTERGREN METHOD: 13 MM/HR (ref 0–20)
HBA1C MFR BLD: 5.1 % (ref 0–5.6)
HCT VFR BLD AUTO: 39.2 % (ref 35–47)
HGB BLD-MCNC: 13.2 G/DL (ref 11.7–15.7)
MCH RBC QN AUTO: 32.2 PG (ref 26.5–33)
MCHC RBC AUTO-ENTMCNC: 33.7 G/DL (ref 31.5–36.5)
MCV RBC AUTO: 96 FL (ref 78–100)
PLATELET # BLD AUTO: 248 10E3/UL (ref 150–450)
RBC # BLD AUTO: 4.1 10E6/UL (ref 3.8–5.2)
WBC # BLD AUTO: 12.2 10E3/UL (ref 4–11)

## 2024-06-27 PROCEDURE — 83036 HEMOGLOBIN GLYCOSYLATED A1C: CPT | Performed by: NURSE PRACTITIONER

## 2024-06-27 PROCEDURE — 36415 COLL VENOUS BLD VENIPUNCTURE: CPT | Performed by: NURSE PRACTITIONER

## 2024-06-27 PROCEDURE — 86140 C-REACTIVE PROTEIN: CPT | Performed by: NURSE PRACTITIONER

## 2024-06-27 PROCEDURE — 85027 COMPLETE CBC AUTOMATED: CPT | Performed by: NURSE PRACTITIONER

## 2024-06-27 PROCEDURE — 99214 OFFICE O/P EST MOD 30 MIN: CPT | Performed by: NURSE PRACTITIONER

## 2024-06-27 PROCEDURE — 85652 RBC SED RATE AUTOMATED: CPT | Performed by: NURSE PRACTITIONER

## 2024-06-27 PROCEDURE — 80053 COMPREHEN METABOLIC PANEL: CPT | Performed by: NURSE PRACTITIONER

## 2024-06-27 PROCEDURE — 84443 ASSAY THYROID STIM HORMONE: CPT | Performed by: NURSE PRACTITIONER

## 2024-06-27 RX ORDER — METHYLPREDNISOLONE 4 MG
TABLET, DOSE PACK ORAL
Qty: 21 TABLET | Refills: 0 | Status: SHIPPED | OUTPATIENT
Start: 2024-06-27

## 2024-06-27 RX ORDER — LEVONORGESTREL AND ETHINYL ESTRADIOL 0.15-0.03
1 KIT ORAL DAILY
Qty: 91 TABLET | Refills: 3 | Status: CANCELLED | OUTPATIENT
Start: 2024-06-27

## 2024-06-27 RX ORDER — CYCLOBENZAPRINE HCL 10 MG
10 TABLET ORAL 3 TIMES DAILY PRN
Qty: 30 TABLET | Refills: 0 | Status: SHIPPED | OUTPATIENT
Start: 2024-06-27 | End: 2024-07-07

## 2024-06-27 ASSESSMENT — ANXIETY QUESTIONNAIRES
7. FEELING AFRAID AS IF SOMETHING AWFUL MIGHT HAPPEN: NOT AT ALL
IF YOU CHECKED OFF ANY PROBLEMS ON THIS QUESTIONNAIRE, HOW DIFFICULT HAVE THESE PROBLEMS MADE IT FOR YOU TO DO YOUR WORK, TAKE CARE OF THINGS AT HOME, OR GET ALONG WITH OTHER PEOPLE: NOT DIFFICULT AT ALL
8. IF YOU CHECKED OFF ANY PROBLEMS, HOW DIFFICULT HAVE THESE MADE IT FOR YOU TO DO YOUR WORK, TAKE CARE OF THINGS AT HOME, OR GET ALONG WITH OTHER PEOPLE?: NOT DIFFICULT AT ALL
GAD7 TOTAL SCORE: 3
1. FEELING NERVOUS, ANXIOUS, OR ON EDGE: SEVERAL DAYS
6. BECOMING EASILY ANNOYED OR IRRITABLE: SEVERAL DAYS
2. NOT BEING ABLE TO STOP OR CONTROL WORRYING: NOT AT ALL
5. BEING SO RESTLESS THAT IT IS HARD TO SIT STILL: NOT AT ALL
GAD7 TOTAL SCORE: 3
7. FEELING AFRAID AS IF SOMETHING AWFUL MIGHT HAPPEN: NOT AT ALL
3. WORRYING TOO MUCH ABOUT DIFFERENT THINGS: SEVERAL DAYS
4. TROUBLE RELAXING: NOT AT ALL
GAD7 TOTAL SCORE: 3

## 2024-06-27 ASSESSMENT — PATIENT HEALTH QUESTIONNAIRE - PHQ9
10. IF YOU CHECKED OFF ANY PROBLEMS, HOW DIFFICULT HAVE THESE PROBLEMS MADE IT FOR YOU TO DO YOUR WORK, TAKE CARE OF THINGS AT HOME, OR GET ALONG WITH OTHER PEOPLE: SOMEWHAT DIFFICULT
SUM OF ALL RESPONSES TO PHQ QUESTIONS 1-9: 3
SUM OF ALL RESPONSES TO PHQ QUESTIONS 1-9: 3

## 2024-06-27 ASSESSMENT — PAIN SCALES - GENERAL: PAINLEVEL: MODERATE PAIN (5)

## 2024-06-27 NOTE — PROGRESS NOTES
"  Assessment & Plan     Low back pain potentially associated with radiculopathy  - methylPREDNISolone (MEDROL DOSEPAK) 4 MG tablet therapy pack  Dispense: 21 tablet; Refill: 0  - cyclobenzaprine (FLEXERIL) 10 MG tablet  Dispense: 30 tablet; Refill: 0  - ESR: Erythrocyte sedimentation rate  - CRP, inflammation  - ESR: Erythrocyte sedimentation rate  - CRP, inflammation    PCOS (polycystic ovarian syndrome)  - Hemoglobin A1c  - Comprehensive metabolic panel (BMP + Alb, Alk Phos, ALT, AST, Total. Bili, TP)  - Hemoglobin A1c  - Comprehensive metabolic panel (BMP + Alb, Alk Phos, ALT, AST, Total. Bili, TP)    Thyroid disorder screening  - TSH with free T4 reflex  - TSH with free T4 reflex    Screening for deficiency anemia  - CBC with platelets  - CBC with platelets      Patient has been advised of split billing requirements and indicates understanding: Yes        BMI  Estimated body mass index is 39.05 kg/m  as calculated from the following:    Height as of this encounter: 1.543 m (5' 0.75\").    Weight as of this encounter: 93 kg (205 lb).   Weight management plan: Discussed healthy diet and exercise guidelines    Counseling  Appropriate preventive services were discussed with this patient, including applicable screening as appropriate for fall prevention, nutrition, physical activity, Tobacco-use cessation, weight loss and cognition.  Checklist reviewing preventive services available has been given to the patient.  Reviewed patient's diet, addressing concerns and/or questions.   Patient is at risk for social isolation and has been provided with information about the benefit of social connection.   The patient was instructed to see the dentist every 6 months.       See Patient Instructions    Molina Seo is a 29 year old, presenting for the following health issues:  Pain (Starts out as lower back pack then hip then down legs since last year when injured at work 2020)      6/27/2024     4:59 PM   Additional " "Questions   Roomed by Elijah   Accompanied by self     Pain    History of Present Illness       Reason for visit:  Nerve pain    She eats 0-1 servings of fruits and vegetables daily.She consumes 1 sweetened beverage(s) daily.She exercises with enough effort to increase her heart rate 9 or less minutes per day.  She exercises with enough effort to increase her heart rate 3 or less days per week. She is missing 1 dose(s) of medications per week.     History of work related back injury 2020.     Back flared back up a while back again. Feeling back pain that travels down the legs. Sometimes has pain in one leg or the other. Low back is there but not sharp. Leg pain is like a shooting, pinprick, knife like pain when it occurs. Working bending and on hands and knees and this aggravates her back.      Would like labs done      Constitutional, HEENT, cardiovascular, pulmonary, GI, , musculoskeletal, neuro, skin, endocrine and psych systems are negative, except as otherwise noted.        Objective    /74 (BP Location: Right arm, Patient Position: Sitting, Cuff Size: Adult Large)   Pulse 84   Temp 99.3  F (37.4  C) (Tympanic)   Resp 24   Ht 1.543 m (5' 0.75\")   Wt 93 kg (205 lb)   LMP 06/14/2024 (Approximate)   SpO2 100%   Breastfeeding No   BMI 39.05 kg/m    Body mass index is 39.05 kg/m .  Physical Exam   GENERAL: alert and no distress  NECK: no adenopathy, no asymmetry, masses, or scars  RESP: lungs clear to auscultation - no rales, rhonchi or wheezes  CV: regular rate and rhythm, normal S1 S2, no S3 or S4, no murmur, click or rub, no peripheral edema  ABDOMEN: soft, nontender, no hepatosplenomegaly, no masses and bowel sounds normal  MS: no gross musculoskeletal defects noted, no edema            Signed Electronically by: Milana Roman CNP    "

## 2024-06-28 LAB
ALBUMIN SERPL BCG-MCNC: 4.5 G/DL (ref 3.5–5.2)
ALP SERPL-CCNC: 45 U/L (ref 40–150)
ALT SERPL W P-5'-P-CCNC: 20 U/L (ref 0–50)
ANION GAP SERPL CALCULATED.3IONS-SCNC: 11 MMOL/L (ref 7–15)
AST SERPL W P-5'-P-CCNC: 24 U/L (ref 0–45)
BILIRUB SERPL-MCNC: 0.4 MG/DL
BUN SERPL-MCNC: 13.5 MG/DL (ref 6–20)
CALCIUM SERPL-MCNC: 9.6 MG/DL (ref 8.6–10)
CHLORIDE SERPL-SCNC: 102 MMOL/L (ref 98–107)
CREAT SERPL-MCNC: 0.69 MG/DL (ref 0.51–0.95)
CRP SERPL-MCNC: 3.72 MG/L
DEPRECATED HCO3 PLAS-SCNC: 24 MMOL/L (ref 22–29)
EGFRCR SERPLBLD CKD-EPI 2021: >90 ML/MIN/1.73M2
GLUCOSE SERPL-MCNC: 63 MG/DL (ref 70–99)
POTASSIUM SERPL-SCNC: 4.6 MMOL/L (ref 3.4–5.3)
PROT SERPL-MCNC: 8.1 G/DL (ref 6.4–8.3)
SODIUM SERPL-SCNC: 137 MMOL/L (ref 135–145)
TSH SERPL DL<=0.005 MIU/L-ACNC: 0.94 UIU/ML (ref 0.3–4.2)

## 2024-09-17 ENCOUNTER — E-VISIT (OUTPATIENT)
Dept: URGENT CARE | Facility: CLINIC | Age: 29
End: 2024-09-17
Payer: COMMERCIAL

## 2024-09-17 DIAGNOSIS — B96.89 ACUTE BACTERIAL SINUSITIS: Primary | ICD-10-CM

## 2024-09-17 DIAGNOSIS — J01.90 ACUTE BACTERIAL SINUSITIS: Primary | ICD-10-CM

## 2024-09-17 PROCEDURE — 99421 OL DIG E/M SVC 5-10 MIN: CPT | Performed by: EMERGENCY MEDICINE

## 2024-09-17 NOTE — PATIENT INSTRUCTIONS
Acute Sinusitis: Care Instructions  Overview     Acute sinusitis is an inflammation of the mucous membranes inside the nose and sinuses. Sinuses are the hollow spaces in your skull around the eyes and nose. Acute sinusitis often follows a cold. Acute sinusitis causes thick, discolored mucus that drains from the nose or down the back of the throat. It also can cause pain and pressure in your head and face along with a stuffy or blocked nose.  In most cases, sinusitis gets better on its own in 1 to 2 weeks. But some mild symptoms may last for several weeks. Sometimes antibiotics are needed if there is a bacterial infection.  Follow-up care is a key part of your treatment and safety. Be sure to make and go to all appointments, and call your doctor if you are having problems. It's also a good idea to know your test results and keep a list of the medicines you take.  How can you care for yourself at home?  Use saline (saltwater) nasal washes. This can help keep your nasal passages open and wash out mucus and allergens.  You can buy saline nose washes at a grocery store or drugstore. Follow the instructions on the package.  You can make your own at home. Add 1 teaspoon of non-iodized salt and 1 teaspoon of baking soda to 2 cups of distilled or boiled and cooled water. Fill a squeeze bottle or a nasal cleansing pot (such as a neti pot) with the nasal wash. Then put the tip into your nostril, and lean over the sink. With your mouth open, gently squirt the liquid. Repeat on the other side.  Try a decongestant nasal spray like oxymetazoline (Afrin). Do not use it for more than 3 days in a row. Using it for more than 3 days can make your congestion worse.  If needed, take an over-the-counter pain medicine, such as acetaminophen (Tylenol), ibuprofen (Advil, Motrin), or naproxen (Aleve). Read and follow all instructions on the label.  If the doctor prescribed antibiotics, take them as directed. Do not stop taking them just  "because you feel better. You need to take the full course of antibiotics.  Be careful when taking over-the-counter cold or flu medicines and Tylenol at the same time. Many of these medicines have acetaminophen, which is Tylenol. Read the labels to make sure that you are not taking more than the recommended dose. Too much acetaminophen (Tylenol) can be harmful.  Try a steroid nasal spray. It may help with your symptoms.  Breathe warm, moist air. You can use a steamy shower, a hot bath, or a sink filled with hot water. Avoid cold, dry air. Using a humidifier in your home may help. Follow the directions for cleaning the machine.  When should you call for help?   Call your doctor now or seek immediate medical care if:    You have new or worse swelling, redness, or pain in your face or around one or both of your eyes.     You have double vision or a change in your vision.     You have a high fever.     You have a severe headache and a stiff neck.     You have mental changes, such as feeling confused or much less alert.   Watch closely for changes in your health, and be sure to contact your doctor if:    You are not getting better as expected.   Where can you learn more?  Go to https://www.[employer].net/patiented  Enter I933 in the search box to learn more about \"Acute Sinusitis: Care Instructions.\"  Current as of: September 27, 2023               Content Version: 14.0    7814-2159 [employer].   Care instructions adapted under license by your healthcare professional. If you have questions about a medical condition or this instruction, always ask your healthcare professional. [employer] disclaims any warranty or liability for your use of this information.      Dear Rayray Contreras       Based on your responses and diagnosis, I have prescribed Augmentin to treat your symptoms. I have sent this to your pharmacy.?     It is also important to stay well hydrated, get lots of rest and take " over-the-counter decongestants,?tylenol?or ibuprofen if you?are able to?take those medications per your primary care provider to help relieve discomfort.?     It is important that you take?all of?your prescribed medication even if your symptoms are improving after a few doses.? Taking?all of?your medicine helps prevent the symptoms from returning.?     If your symptoms worsen, you develop severe headache, vomiting, high fever (>102), or are not improving in 7 days, please contact your primary care provider for an appointment or visit any of our convenient Walk-in Care or Urgent Care Centers to be seen which can be found on our website?here.?     Thanks again for choosing?us?as your health care partner,?   ?  Herbie Smith MD?   Thank you for choosing us for your care. I have placed an order for a prescription so that you can start treatment. View your full visit summary for details by clicking on the link below. Your pharmacist will able to address any questions you may have about the medication.     If you're not feeling better within 5-7 days, please schedule an appointment.  You can schedule an appointment right here in [facility], or call [phone]  If the visit is for the same symptoms as your eVisit, we'll refund the cost of your eVisit if seen within seven days.

## 2024-10-02 ENCOUNTER — E-VISIT (OUTPATIENT)
Dept: URGENT CARE | Facility: CLINIC | Age: 29
End: 2024-10-02
Payer: COMMERCIAL

## 2024-10-02 DIAGNOSIS — J02.9 PHARYNGITIS, UNSPECIFIED ETIOLOGY: Primary | ICD-10-CM

## 2024-10-02 PROCEDURE — 99207 PR NON-BILLABLE SERV PER CHARTING: CPT | Performed by: FAMILY MEDICINE

## 2024-10-02 NOTE — PATIENT INSTRUCTIONS
Dear Rayray,    After reviewing your responses, I would like you to come in for a swab to make sure we treat you correctly. This swab is to evaluate you for possible Strep Throat, and should be scheduled for today or tomorrow. Please use the Schedule Now button in [employer] to schedule your swab. Otherwise, click this link to schedule a lab only appointment.    Lab appointments are not available at most locations on the weekends. If no Lab Only appointment is available, you should be seen in any of our convenient Urgent Care Centers for an in person visit, which can be found on our website here.    You will receive instructions with your results in [employer] once they are available.     If your symptoms worsen, you develop difficulty breathing, difficulty with drinking enough to stay hydrated, difficulty swallowing your saliva or have fevers for more than 5 days, please contact your primary care provider for an appointment or visit an Urgent Care Center to be seen.      Thanks again for choosing us as your health care partner.   MD Rayray Parry,    Thank you for choosing us for your care. I have placed an order for a lab test(s). View your full visit summary for details by clicking on the link below. You can schedule a lab only appointment right here in [employer], or by calling [phone].    You will receive your lab results and next steps via [employer] when the lab results return.    Sincerely,  Dangelo Salazar MD

## 2024-10-28 ENCOUNTER — TELEPHONE (OUTPATIENT)
Dept: FAMILY MEDICINE | Facility: CLINIC | Age: 29
End: 2024-10-28
Payer: COMMERCIAL

## 2024-10-28 NOTE — TELEPHONE ENCOUNTER
Placed call to patient - unable to LM due to VM box being full. [employer] message sent     Physical this yr 11/4/24  Last yr 11/30/23    Advise to check with insurance regarding coverage.

## 2024-11-19 ENCOUNTER — HOSPITAL ENCOUNTER (EMERGENCY)
Facility: CLINIC | Age: 29
Discharge: HOME | End: 2024-11-19
Attending: EMERGENCY MEDICINE | Admitting: EMERGENCY MEDICINE
Payer: COMMERCIAL

## 2024-11-19 ENCOUNTER — APPOINTMENT (OUTPATIENT)
Dept: CT IMAGING | Facility: CLINIC | Age: 29
End: 2024-11-19
Attending: EMERGENCY MEDICINE
Payer: COMMERCIAL

## 2024-11-19 VITALS
BODY MASS INDEX: 39.82 KG/M2 | RESPIRATION RATE: 16 BRPM | WEIGHT: 209 LBS | HEART RATE: 76 BPM | OXYGEN SATURATION: 99 % | TEMPERATURE: 98 F | DIASTOLIC BLOOD PRESSURE: 86 MMHG | SYSTOLIC BLOOD PRESSURE: 128 MMHG

## 2024-11-19 DIAGNOSIS — R51.9 ACUTE NONINTRACTABLE HEADACHE, UNSPECIFIED HEADACHE TYPE: ICD-10-CM

## 2024-11-19 LAB
ANION GAP SERPL CALCULATED.3IONS-SCNC: 14 MMOL/L (ref 7–15)
BASOPHILS # BLD AUTO: 0.1 10E3/UL (ref 0–0.2)
BASOPHILS NFR BLD AUTO: 1 %
BUN SERPL-MCNC: 10.3 MG/DL (ref 6–20)
CALCIUM SERPL-MCNC: 9.7 MG/DL (ref 8.8–10.4)
CHLORIDE SERPL-SCNC: 106 MMOL/L (ref 98–107)
CREAT SERPL-MCNC: 0.61 MG/DL (ref 0.51–0.95)
EGFRCR SERPLBLD CKD-EPI 2021: >90 ML/MIN/1.73M2
EOSINOPHIL # BLD AUTO: 0.3 10E3/UL (ref 0–0.7)
EOSINOPHIL NFR BLD AUTO: 2 %
ERYTHROCYTE [DISTWIDTH] IN BLOOD BY AUTOMATED COUNT: 12.5 % (ref 10–15)
GLUCOSE SERPL-MCNC: 69 MG/DL (ref 70–99)
HCO3 SERPL-SCNC: 21 MMOL/L (ref 22–29)
HCT VFR BLD AUTO: 39 % (ref 35–47)
HGB BLD-MCNC: 13 G/DL (ref 11.7–15.7)
HOLD SPECIMEN: NORMAL
HOLD SPECIMEN: NORMAL
IMM GRANULOCYTES # BLD: 0 10E3/UL
IMM GRANULOCYTES NFR BLD: 0 %
LYMPHOCYTES # BLD AUTO: 3.7 10E3/UL (ref 0.8–5.3)
LYMPHOCYTES NFR BLD AUTO: 32 %
MCH RBC QN AUTO: 31.9 PG (ref 26.5–33)
MCHC RBC AUTO-ENTMCNC: 33.3 G/DL (ref 31.5–36.5)
MCV RBC AUTO: 96 FL (ref 78–100)
MONOCYTES # BLD AUTO: 1.2 10E3/UL (ref 0–1.3)
MONOCYTES NFR BLD AUTO: 11 %
NEUTROPHILS # BLD AUTO: 6.3 10E3/UL (ref 1.6–8.3)
NEUTROPHILS NFR BLD AUTO: 54 %
NRBC # BLD AUTO: 0 10E3/UL
NRBC BLD AUTO-RTO: 0 /100
PLATELET # BLD AUTO: 260 10E3/UL (ref 150–450)
POTASSIUM SERPL-SCNC: 3.9 MMOL/L (ref 3.4–5.3)
RBC # BLD AUTO: 4.08 10E6/UL (ref 3.8–5.2)
SODIUM SERPL-SCNC: 141 MMOL/L (ref 135–145)
TROPONIN T SERPL HS-MCNC: <6 NG/L
WBC # BLD AUTO: 11.6 10E3/UL (ref 4–11)

## 2024-11-19 PROCEDURE — 70450 CT HEAD/BRAIN W/O DYE: CPT

## 2024-11-19 PROCEDURE — 80048 BASIC METABOLIC PNL TOTAL CA: CPT | Performed by: EMERGENCY MEDICINE

## 2024-11-19 PROCEDURE — 96375 TX/PRO/DX INJ NEW DRUG ADDON: CPT

## 2024-11-19 PROCEDURE — 36415 COLL VENOUS BLD VENIPUNCTURE: CPT | Performed by: EMERGENCY MEDICINE

## 2024-11-19 PROCEDURE — 82435 ASSAY OF BLOOD CHLORIDE: CPT | Performed by: EMERGENCY MEDICINE

## 2024-11-19 PROCEDURE — 93005 ELECTROCARDIOGRAM TRACING: CPT

## 2024-11-19 PROCEDURE — 250N000011 HC RX IP 250 OP 636: Performed by: EMERGENCY MEDICINE

## 2024-11-19 PROCEDURE — 99285 EMERGENCY DEPT VISIT HI MDM: CPT | Mod: 25

## 2024-11-19 PROCEDURE — 85018 HEMOGLOBIN: CPT | Performed by: EMERGENCY MEDICINE

## 2024-11-19 PROCEDURE — 84484 ASSAY OF TROPONIN QUANT: CPT | Performed by: EMERGENCY MEDICINE

## 2024-11-19 PROCEDURE — 96374 THER/PROPH/DIAG INJ IV PUSH: CPT

## 2024-11-19 PROCEDURE — 85004 AUTOMATED DIFF WBC COUNT: CPT | Performed by: EMERGENCY MEDICINE

## 2024-11-19 RX ORDER — ONDANSETRON 4 MG/1
4 TABLET, ORALLY DISINTEGRATING ORAL EVERY 8 HOURS PRN
Qty: 10 TABLET | Refills: 0 | Status: SHIPPED | OUTPATIENT
Start: 2024-11-19 | End: 2024-11-22

## 2024-11-19 RX ORDER — METOCLOPRAMIDE HYDROCHLORIDE 5 MG/ML
10 INJECTION INTRAMUSCULAR; INTRAVENOUS ONCE
Status: COMPLETED | OUTPATIENT
Start: 2024-11-19 | End: 2024-11-19

## 2024-11-19 RX ORDER — DIPHENHYDRAMINE HYDROCHLORIDE 50 MG/ML
25 INJECTION INTRAMUSCULAR; INTRAVENOUS ONCE
Status: COMPLETED | OUTPATIENT
Start: 2024-11-19 | End: 2024-11-19

## 2024-11-19 RX ORDER — DEXAMETHASONE SODIUM PHOSPHATE 10 MG/ML
10 INJECTION, SOLUTION INTRAMUSCULAR; INTRAVENOUS ONCE
Status: COMPLETED | OUTPATIENT
Start: 2024-11-19 | End: 2024-11-19

## 2024-11-19 RX ADMIN — DIPHENHYDRAMINE HYDROCHLORIDE 25 MG: 50 INJECTION, SOLUTION INTRAMUSCULAR; INTRAVENOUS at 18:48

## 2024-11-19 RX ADMIN — DEXAMETHASONE SODIUM PHOSPHATE 10 MG: 10 INJECTION, SOLUTION INTRAMUSCULAR; INTRAVENOUS at 18:48

## 2024-11-19 RX ADMIN — METOCLOPRAMIDE 10 MG: 5 INJECTION, SOLUTION INTRAMUSCULAR; INTRAVENOUS at 18:48

## 2024-11-19 ASSESSMENT — COLUMBIA-SUICIDE SEVERITY RATING SCALE - C-SSRS
1. IN THE PAST MONTH, HAVE YOU WISHED YOU WERE DEAD OR WISHED YOU COULD GO TO SLEEP AND NOT WAKE UP?: NO
6. HAVE YOU EVER DONE ANYTHING, STARTED TO DO ANYTHING, OR PREPARED TO DO ANYTHING TO END YOUR LIFE?: NO
2. HAVE YOU ACTUALLY HAD ANY THOUGHTS OF KILLING YOURSELF IN THE PAST MONTH?: NO

## 2024-11-19 ASSESSMENT — ACTIVITIES OF DAILY LIVING (ADL)
ADLS_ACUITY_SCORE: 0
ADLS_ACUITY_SCORE: 0

## 2024-11-19 NOTE — ED TRIAGE NOTES
Pt reports that she woke up this am at 0130 and with finger numbness in the left hand, PT reports that these symptoms subsided and started having CP that radiates into the left arm, pt reports that she also is having a headache that has persisted since noon today, pt BEFAST negative, pt reports N/V as well. VSS and ABC's intact

## 2024-11-19 NOTE — Clinical Note
Rayray Contreras was seen and treated in our emergency department on 11/19/2024.  She may return to work on 11/21/2024.       If you have any questions or concerns, please don't hesitate to call.      Corona Randolph MD

## 2024-11-20 LAB
ATRIAL RATE - MUSE: 74 BPM
DIASTOLIC BLOOD PRESSURE - MUSE: NORMAL MMHG
INTERPRETATION ECG - MUSE: NORMAL
P AXIS - MUSE: 60 DEGREES
PR INTERVAL - MUSE: 150 MS
QRS DURATION - MUSE: 78 MS
QT - MUSE: 358 MS
QTC - MUSE: 397 MS
R AXIS - MUSE: 82 DEGREES
SYSTOLIC BLOOD PRESSURE - MUSE: NORMAL MMHG
T AXIS - MUSE: 58 DEGREES
VENTRICULAR RATE- MUSE: 74 BPM

## 2024-11-20 NOTE — ED PROVIDER NOTES
Emergency Department Note      History of Present Illness     Chief Complaint   Multiple Complaints    HPI   Rayray Contreras is a 29 year old female who presents to the ED with her mom for evaluation of multiple complaints. The patient reports that she woke up in the middle of the night at 0130 with numbness in her left thumb and pointer finger. She decided to use her moms oximeter, and her pulse was 92 and oxygen was 97. She then went to the bathroom and checked again, noting that her pule was lower. Patient went to lay back in bed and around 0200, she had chest pain. Once she woke up in the morning at 1096-1269, she developed a headache that starts in the front and radiates to the back. She did not take any Tylenol or ibuprofen, but states her headache was fine throughout the day. Patient then reports that at 1530, she began feeling nauseous. Denies shortness of breath, fever, cough, diarrhea, sick contacts, recent falls or head injuries. Patient notes she has tried to stay hydrated. Mom adds that patient has been complaining of a headache for weeks and has been taking Tylenol, as she can't take ibuprofen. Also states patient has complained of on and off nausea. Denies chance of pregnancy or history of migraines.    Independent Historian   Mother as detailed above.    Review of External Notes   none    Past Medical History     Medical History and Problem List   Depression  Developmental delay  PCOS  Obesity  Von Willebrand's disease    Medications   Medrol    Surgical History   The patient has no pertinent past surgical history.     Physical Exam     Patient Vitals for the past 24 hrs:   BP Temp Temp src Pulse Resp SpO2 Weight   11/19/24 1746 (!) 151/87 98  F (36.7  C) Temporal 70 18 99 % 94.8 kg (208 lb 15.9 oz)     Physical Exam  Constitutional:       Appearance: She is well-developed.   HENT:      Right Ear: Tympanic membrane and external ear normal.      Left Ear: Tympanic membrane and external ear normal.       Mouth/Throat:      Mouth: Mucous membranes are moist.      Pharynx: Oropharynx is clear. No oropharyngeal exudate or posterior oropharyngeal erythema.   Eyes:      General: No scleral icterus.     Extraocular Movements: Extraocular movements intact.      Conjunctiva/sclera: Conjunctivae normal.      Pupils: Pupils are equal, round, and reactive to light.   Cardiovascular:      Rate and Rhythm: Normal rate and regular rhythm.      Heart sounds: Normal heart sounds. No murmur heard.     No friction rub. No gallop.   Pulmonary:      Effort: Pulmonary effort is normal. No respiratory distress.      Breath sounds: Normal breath sounds. No stridor. No wheezing, rhonchi or rales.   Abdominal:      General: Bowel sounds are normal. There is no distension.      Palpations: Abdomen is soft. There is no mass.      Tenderness: There is no abdominal tenderness.   Musculoskeletal:         General: Normal range of motion.      Cervical back: Normal range of motion and neck supple. No rigidity or tenderness.   Skin:     General: Skin is warm and dry.      Capillary Refill: Capillary refill takes less than 2 seconds.      Findings: No rash.   Neurological:      General: No focal deficit present.      Mental Status: She is alert.      Cranial Nerves: No cranial nerve deficit.      Motor: No weakness.      Coordination: Coordination normal.      Gait: Gait normal.           Diagnostics     Lab Results   Labs Ordered and Resulted from Time of ED Arrival to Time of ED Departure   BASIC METABOLIC PANEL - Abnormal       Result Value    Sodium 141      Potassium 3.9      Chloride 106      Carbon Dioxide (CO2) 21 (*)     Anion Gap 14      Urea Nitrogen 10.3      Creatinine 0.61      GFR Estimate >90      Calcium 9.7      Glucose 69 (*)    CBC WITH PLATELETS AND DIFFERENTIAL - Abnormal    WBC Count 11.6 (*)     RBC Count 4.08      Hemoglobin 13.0      Hematocrit 39.0      MCV 96      MCH 31.9      MCHC 33.3      RDW 12.5      Platelet  Count 260      % Neutrophils 54      % Lymphocytes 32      % Monocytes 11      % Eosinophils 2      % Basophils 1      % Immature Granulocytes 0      NRBCs per 100 WBC 0      Absolute Neutrophils 6.3      Absolute Lymphocytes 3.7      Absolute Monocytes 1.2      Absolute Eosinophils 0.3      Absolute Basophils 0.1      Absolute Immature Granulocytes 0.0      Absolute NRBCs 0.0     TROPONIN T, HIGH SENSITIVITY - Normal    Troponin T, High Sensitivity <6       Imaging   CT Head w/o Contrast   Final Result   IMPRESSION:   1.  Normal head CT.        EKG   ECG taken at 1809, ECG read at 1817  Normal sinus rhythm with sinus arrhythmia   Rate 74 bpm. MN interval 150 ms. QRS duration 78 ms. QT/QTc 358/397 ms. P-R-T axes 60 82 58.    Independent Interpretation   none    ED Course      Medications Administered   Medications   metoclopramide (REGLAN) injection 10 mg (10 mg Intravenous $Given 11/19/24 1848)   diphenhydrAMINE (BENADRYL) injection 25 mg (25 mg Intravenous $Given 11/19/24 1848)   dexAMETHasone PF (DECADRON) injection 10 mg (10 mg Intravenous $Given 11/19/24 1848)     Procedures   None      Discussion of Management   None    ED Course   ED Course as of 11/19/24 2024 Tue Nov 19, 2024 1828 I obtained history and examined the patient as noted above.    2015 I rechecked and updated the patient.      Additional Documentation  None    Medical Decision Making / Diagnosis     CMS Diagnoses: None    MIPS       None    MDM   Rayray Contreras is a 29 year old female who presents with above symptoms.  She is otherwise well-appearing.  No meningismus to suggest meningitis.  I have low suspicion for subarachnoid hemorrhage given her well appearance and exam.  Since she does not have a formal diagnosis of recurrent headaches or migraine, CT was performed.  Her labs and CT are reassuring.  She responded well to the medications.  Headache was minimal and she had resolution of her nausea.  Most likely this could be migraine  induced headache.  She is advised to follow-up with her doctor in the next week or so.  I did send her with Zofran for nausea as needed.  She is asked to push fluids and rest and use Tylenol.  Work note was given today.  Patient discharged in stable and improved condition.    Disposition   The patient was discharged.     Diagnosis     ICD-10-CM    1. Acute nonintractable headache, unspecified headache type  R51.9          Discharge Medications   New Prescriptions    ONDANSETRON (ZOFRAN ODT) 4 MG ODT TAB    Take 1 tablet (4 mg) by mouth every 8 hours as needed for nausea or vomiting.     Scribe Disclosure:  LUDY YOU, am serving as a scribe at 6:29 PM on 11/19/2024 to document services personally performed by Corona Randolph MD based on my observations and the provider's statements to me.      Corona Randolph MD  11/20/24 0038

## 2024-11-20 NOTE — DISCHARGE INSTRUCTIONS
Tylenol 1000mg every 8 hours for pain  Zofran for nausea as needed  Push fluids and rest  Recheck with your doctor in 2 days  Return if worsening symptoms

## 2024-11-20 NOTE — ED NOTES
Pt discharged home by writer. PIV removed. Pt looks well, AVS given with note and script- agreeable to plan of care. Pt left amb with good gait to exit.

## 2024-11-25 ENCOUNTER — VIRTUAL VISIT (OUTPATIENT)
Dept: FAMILY MEDICINE | Facility: CLINIC | Age: 29
End: 2024-11-25
Payer: COMMERCIAL

## 2024-11-25 DIAGNOSIS — G43.909 MIGRAINE WITHOUT STATUS MIGRAINOSUS, NOT INTRACTABLE, UNSPECIFIED MIGRAINE TYPE: Primary | ICD-10-CM

## 2024-11-25 PROCEDURE — 99214 OFFICE O/P EST MOD 30 MIN: CPT | Mod: 95 | Performed by: NURSE PRACTITIONER

## 2024-11-25 RX ORDER — SUMATRIPTAN SUCCINATE 25 MG/1
25 TABLET ORAL
Qty: 18 TABLET | Refills: 0 | Status: SHIPPED | OUTPATIENT
Start: 2024-11-25

## 2024-11-25 RX ORDER — ONDANSETRON 4 MG/1
4 TABLET, ORALLY DISINTEGRATING ORAL EVERY 8 HOURS PRN
Qty: 20 TABLET | Refills: 0 | Status: SHIPPED | OUTPATIENT
Start: 2024-11-25

## 2024-11-25 ASSESSMENT — ANXIETY QUESTIONNAIRES
7. FEELING AFRAID AS IF SOMETHING AWFUL MIGHT HAPPEN: SEVERAL DAYS
2. NOT BEING ABLE TO STOP OR CONTROL WORRYING: SEVERAL DAYS
5. BEING SO RESTLESS THAT IT IS HARD TO SIT STILL: NOT AT ALL
GAD7 TOTAL SCORE: 2
3. WORRYING TOO MUCH ABOUT DIFFERENT THINGS: NOT AT ALL
IF YOU CHECKED OFF ANY PROBLEMS ON THIS QUESTIONNAIRE, HOW DIFFICULT HAVE THESE PROBLEMS MADE IT FOR YOU TO DO YOUR WORK, TAKE CARE OF THINGS AT HOME, OR GET ALONG WITH OTHER PEOPLE: NOT DIFFICULT AT ALL
1. FEELING NERVOUS, ANXIOUS, OR ON EDGE: NOT AT ALL
8. IF YOU CHECKED OFF ANY PROBLEMS, HOW DIFFICULT HAVE THESE MADE IT FOR YOU TO DO YOUR WORK, TAKE CARE OF THINGS AT HOME, OR GET ALONG WITH OTHER PEOPLE?: NOT DIFFICULT AT ALL
4. TROUBLE RELAXING: NOT AT ALL
6. BECOMING EASILY ANNOYED OR IRRITABLE: NOT AT ALL
GAD7 TOTAL SCORE: 2
7. FEELING AFRAID AS IF SOMETHING AWFUL MIGHT HAPPEN: SEVERAL DAYS
GAD7 TOTAL SCORE: 2

## 2024-11-25 NOTE — PROGRESS NOTES
Rayray is a 29 year old who is being evaluated via a billable video visit.    How would you like to obtain your AVS? MyChart  If the video visit is dropped, the invitation should be resent by: Text to cell phone: [phone]  Will anyone else be joining your video visit? No      Assessment & Plan     Migraine without status migrainosus, not intractable, unspecified migraine type  Trial below. Let me know if ongoing symptoms.   - REVIEW OF HEALTH MAINTENANCE PROTOCOL ORDERS  - SUMAtriptan (IMITREX) 25 MG tablet  Dispense: 18 tablet; Refill: 0  - ondansetron (ZOFRAN ODT) 4 MG ODT tab  Dispense: 20 tablet; Refill: 0          MED REC REQUIRED  Post Medication Reconciliation Status: discharge medications reconciled and changed, per note/orders    See Patient Instructions    Subjective   Rayray is a 29 year old, presenting for the following health issues:  Hospital F/U      11/25/2024    10:45 AM   Additional Questions   Roomed by Miladys Mallory RN   Accompanied by Self     HPI       Hospital Follow-up Visit:    Hospital/Nursing Home/IP Rehab Facility: [facility]  Date of Admission: 11/19  Date of Discharge: 11/19  Reason(s) for Admission: nausea, numbness in hand, headache, arm pain, cardiac concerns  Was the patient in the ICU or did the patient experience delirium during hospitalization?  No  Do you have any other stressors you would like to discuss with your provider? Health Concerns, discuss glucose value    Problems taking medications regularly:  None  Medication changes since discharge: None  Problems adhering to non-medication therapy:  None    Summary of hospitalization:  [facility] discharge summary reviewed  Diagnostic Tests/Treatments reviewed.  Follow up needed: none  Other Healthcare Providers Involved in Patient s Care:         None  Update since discharge: improved.         Plan of care communicated with patient         Drinking plenty of water but still dealing with  lightheadedness. Aleve when needed but not helping. 8/10. Having pain in the sinus area and frontal. Sinuses on CT were normal.     BP was high in ED. Thinks it has been normal but has not checked. Physical coming up in about 2 weeks.       Constitutional, HEENT, cardiovascular, pulmonary, GI, , musculoskeletal, neuro, skin, endocrine and psych systems are negative, except as otherwise noted.        Objective           Vitals:  No vitals were obtained today due to virtual visit.    Physical Exam   GENERAL: alert and no distress  EYES: Eyes grossly normal to inspection.  No discharge or erythema, or obvious scleral/conjunctival abnormalities.  RESP: No audible wheeze, cough, or visible cyanosis.    SKIN: Visible skin clear. No significant rash, abnormal pigmentation or lesions.  NEURO: Cranial nerves grossly intact.  Mentation and speech appropriate for age.  PSYCH: Appropriate affect, tone, and pace of words          Video-Visit Details    Type of service:  Video Visit   Originating Location (pt. Location): Home    Distant Location (provider location):  On-site  Platform used for Video Visit: Bharat  Signed Electronically by: Milana Roman CNP    .undefined[^^  Answers submitted by the patient for this visit:  Patient Health Questionnaire (Submitted on 11/25/2024)  If you checked off any problems, how difficult have these problems made it for you to do your work, take care of things at home, or get along with other people?: Not difficult at all  PHQ9 TOTAL SCORE: 5  Patient Health Questionnaire (G7) (Submitted on 11/25/2024)  KWAKU 7 TOTAL SCORE: 2

## 2025-01-25 ENCOUNTER — HEALTH MAINTENANCE LETTER (OUTPATIENT)
Age: 30
End: 2025-01-25

## 2025-02-20 ENCOUNTER — APPOINTMENT (OUTPATIENT)
Dept: GENERAL RADIOLOGY | Facility: CLINIC | Age: 30
End: 2025-02-20
Attending: EMERGENCY MEDICINE
Payer: COMMERCIAL

## 2025-02-20 ENCOUNTER — HOSPITAL ENCOUNTER (EMERGENCY)
Facility: CLINIC | Age: 30
Discharge: HOME | End: 2025-02-20
Attending: EMERGENCY MEDICINE
Payer: COMMERCIAL

## 2025-02-20 VITALS
WEIGHT: 205.03 LBS | SYSTOLIC BLOOD PRESSURE: 127 MMHG | DIASTOLIC BLOOD PRESSURE: 81 MMHG | HEART RATE: 73 BPM | RESPIRATION RATE: 16 BRPM | TEMPERATURE: 98.5 F | BODY MASS INDEX: 39.06 KG/M2 | OXYGEN SATURATION: 97 %

## 2025-02-20 DIAGNOSIS — R07.9 CHEST PAIN, UNSPECIFIED TYPE: ICD-10-CM

## 2025-02-20 DIAGNOSIS — M25.512 ACUTE PAIN OF LEFT SHOULDER: ICD-10-CM

## 2025-02-20 LAB
ANION GAP SERPL CALCULATED.3IONS-SCNC: 11 MMOL/L (ref 7–15)
ATRIAL RATE - MUSE: 82 BPM
BASOPHILS # BLD AUTO: 0.1 10E3/UL (ref 0–0.2)
BASOPHILS NFR BLD AUTO: 1 %
BUN SERPL-MCNC: 14.2 MG/DL (ref 6–20)
CALCIUM SERPL-MCNC: 9.4 MG/DL (ref 8.8–10.4)
CHLORIDE SERPL-SCNC: 102 MMOL/L (ref 98–107)
CREAT SERPL-MCNC: 0.64 MG/DL (ref 0.51–0.95)
DIASTOLIC BLOOD PRESSURE - MUSE: NORMAL MMHG
EGFRCR SERPLBLD CKD-EPI 2021: >90 ML/MIN/1.73M2
EOSINOPHIL # BLD AUTO: 0.2 10E3/UL (ref 0–0.7)
EOSINOPHIL NFR BLD AUTO: 2 %
ERYTHROCYTE [DISTWIDTH] IN BLOOD BY AUTOMATED COUNT: 12.2 % (ref 10–15)
GLUCOSE SERPL-MCNC: 87 MG/DL (ref 70–99)
HCO3 SERPL-SCNC: 21 MMOL/L (ref 22–29)
HCT VFR BLD AUTO: 39.6 % (ref 35–47)
HGB BLD-MCNC: 13.3 G/DL (ref 11.7–15.7)
IMM GRANULOCYTES # BLD: 0 10E3/UL
IMM GRANULOCYTES NFR BLD: 0 %
INTERPRETATION ECG - MUSE: NORMAL
LYMPHOCYTES # BLD AUTO: 2.9 10E3/UL (ref 0.8–5.3)
LYMPHOCYTES NFR BLD AUTO: 33 %
MCH RBC QN AUTO: 31.3 PG (ref 26.5–33)
MCHC RBC AUTO-ENTMCNC: 33.6 G/DL (ref 31.5–36.5)
MCV RBC AUTO: 93 FL (ref 78–100)
MONOCYTES # BLD AUTO: 1 10E3/UL (ref 0–1.3)
MONOCYTES NFR BLD AUTO: 12 %
NEUTROPHILS # BLD AUTO: 4.7 10E3/UL (ref 1.6–8.3)
NEUTROPHILS NFR BLD AUTO: 53 %
NRBC # BLD AUTO: 0 10E3/UL
NRBC BLD AUTO-RTO: 0 /100
P AXIS - MUSE: 56 DEGREES
PLATELET # BLD AUTO: 249 10E3/UL (ref 150–450)
POTASSIUM SERPL-SCNC: 4.3 MMOL/L (ref 3.4–5.3)
PR INTERVAL - MUSE: 146 MS
QRS DURATION - MUSE: 80 MS
QT - MUSE: 362 MS
QTC - MUSE: 422 MS
R AXIS - MUSE: 79 DEGREES
RBC # BLD AUTO: 4.25 10E6/UL (ref 3.8–5.2)
SODIUM SERPL-SCNC: 134 MMOL/L (ref 135–145)
SYSTOLIC BLOOD PRESSURE - MUSE: NORMAL MMHG
T AXIS - MUSE: 44 DEGREES
TROPONIN T SERPL HS-MCNC: <6 NG/L
VENTRICULAR RATE- MUSE: 82 BPM
WBC # BLD AUTO: 8.9 10E3/UL (ref 4–11)

## 2025-02-20 PROCEDURE — 82310 ASSAY OF CALCIUM: CPT | Performed by: EMERGENCY MEDICINE

## 2025-02-20 PROCEDURE — 85004 AUTOMATED DIFF WBC COUNT: CPT | Performed by: EMERGENCY MEDICINE

## 2025-02-20 PROCEDURE — 80048 BASIC METABOLIC PNL TOTAL CA: CPT | Performed by: EMERGENCY MEDICINE

## 2025-02-20 PROCEDURE — 85041 AUTOMATED RBC COUNT: CPT | Performed by: EMERGENCY MEDICINE

## 2025-02-20 PROCEDURE — 73030 X-RAY EXAM OF SHOULDER: CPT | Mod: LT

## 2025-02-20 PROCEDURE — 93005 ELECTROCARDIOGRAM TRACING: CPT

## 2025-02-20 PROCEDURE — 71046 X-RAY EXAM CHEST 2 VIEWS: CPT

## 2025-02-20 PROCEDURE — 99285 EMERGENCY DEPT VISIT HI MDM: CPT | Mod: 25

## 2025-02-20 PROCEDURE — 84484 ASSAY OF TROPONIN QUANT: CPT | Performed by: EMERGENCY MEDICINE

## 2025-02-20 PROCEDURE — 36415 COLL VENOUS BLD VENIPUNCTURE: CPT | Performed by: EMERGENCY MEDICINE

## 2025-02-20 ASSESSMENT — COLUMBIA-SUICIDE SEVERITY RATING SCALE - C-SSRS
6. HAVE YOU EVER DONE ANYTHING, STARTED TO DO ANYTHING, OR PREPARED TO DO ANYTHING TO END YOUR LIFE?: YES
2. HAVE YOU ACTUALLY HAD ANY THOUGHTS OF KILLING YOURSELF IN THE PAST MONTH?: NO
1. IN THE PAST MONTH, HAVE YOU WISHED YOU WERE DEAD OR WISHED YOU COULD GO TO SLEEP AND NOT WAKE UP?: NO

## 2025-02-20 ASSESSMENT — ACTIVITIES OF DAILY LIVING (ADL)
ADLS_ACUITY_SCORE: 41
ADLS_ACUITY_SCORE: 41

## 2025-02-20 NOTE — ED TRIAGE NOTES
Arrives from home with complaints of left shoulder pain x 2 days. Pt states also had an episode of nausea/heart palpitations that lasted about a minute. Denies recent illness or feeling nauseated. Pain 8/10 but varies depending on what she is doing. Denies having any cardiac history.       Triage Assessment (Adult)       Row Name 02/20/25 0249          Triage Assessment    Airway WDL WDL        Respiratory WDL    Respiratory WDL WDL        Skin Circulation/Temperature WDL    Skin Circulation/Temperature WDL WDL        Cardiac WDL    Cardiac WDL WDL        Peripheral/Neurovascular WDL    Peripheral Neurovascular WDL WDL        Cognitive/Neuro/Behavioral WDL    Cognitive/Neuro/Behavioral WDL WDL

## 2025-02-20 NOTE — ED PROVIDER NOTES
Emergency Department Note      History of Present Illness     Chief Complaint   Chest Pain    HPI   Rayray Contreras is a 29 year old female with a history of depressive disorder who presents to the emergency department for chest pain. The patient states that she injured her back a few years ago and has since been experiencing pain in her back but reports that recently, she began experiencing left upper chest pain. She notes that she works as a  and that her chest pain is exacerbated upon repetitive movements. She adds that for 2 days, she has also been experiencing a cough. She notes that she is experiencing ongoing numbness in her bilateral hands. Her mother states that last night, the patient called her and reported that she was experiencing an onset of palpitations as well. No trauma to her chest or shoulder. No pain upon inspiration or exertion. No nausea, vomiting, diarrhea, or abdominal pain. No fever or chills. No back pain. The patient adds that she has a hx of anxiety and depression and several years ago, took herself off of her depression medications.    Independent Historian   Mother as detailed above.    Past Medical History     Medical History and Problem List   Depressive disorder  PCOS    Medications   levonorgestrel-ethinyl estradiol (SEASONALE) 0.15-0.03 MG tablet  ondansetron (ZOFRAN ODT) 4 MG ODT tab  SUMAtriptan (IMITREX) 25 MG tablet    Physical Exam     Patient Vitals for the past 24 hrs:   BP Temp Temp src Pulse Resp SpO2 Weight   02/20/25 0347 (!) 146/89 98.5  F (36.9  C) Oral 83 16 100 % 93 kg (205 lb 0.4 oz)     Physical Exam  General: Patient is awake, alert and interactive  Head: The scalp, face, and head appear normal  Eyes: The pupils are equal, round, and reactive to light. Conjunctivae and sclerae are normal  Neck: Normal range of motion.  CV: Regular rate and rhythm. Peripheral pulses including radial pulses are symmetric.   Resp: Lungs are clear without wheezes or  rales. No respiratory distress.   GI: Abdomen is soft, no rigidity, guarding, or rebound. No distension. No tenderness to palpation in any quadrant.    MS: Chest wall is non tender to palpation. No asymmetric leg swelling, calf or thigh tenderness.    Skin: No rash or lesions noted. Normal capillary refill noted  Neuro: Speech is normal and fluent. Face is symmetric. Moving all extremities.   Psych:  Normal affect.  Appropriate interactions.    Diagnostics     Lab Results   Labs Ordered and Resulted from Time of ED Arrival to Time of ED Departure   BASIC METABOLIC PANEL - Abnormal       Result Value    Sodium 134 (*)     Potassium 4.3      Chloride 102      Carbon Dioxide (CO2) 21 (*)     Anion Gap 11      Urea Nitrogen 14.2      Creatinine 0.64      GFR Estimate >90      Calcium 9.4      Glucose 87     TROPONIN T, HIGH SENSITIVITY - Normal    Troponin T, High Sensitivity <6     CBC WITH PLATELETS AND DIFFERENTIAL    WBC Count 8.9      RBC Count 4.25      Hemoglobin 13.3      Hematocrit 39.6      MCV 93      MCH 31.3      MCHC 33.6      RDW 12.2      Platelet Count 249      % Neutrophils 53      % Lymphocytes 33      % Monocytes 12      % Eosinophils 2      % Basophils 1      % Immature Granulocytes 0      NRBCs per 100 WBC 0      Absolute Neutrophils 4.7      Absolute Lymphocytes 2.9      Absolute Monocytes 1.0      Absolute Eosinophils 0.2      Absolute Basophils 0.1      Absolute Immature Granulocytes 0.0      Absolute NRBCs 0.0       Imaging   XR Shoulder Left G/E 3 Views   Final Result   IMPRESSION: Normal joint spaces and alignment. No fracture.      XR Chest 2 Views   Final Result   IMPRESSION: Negative chest.        EKG   ECG results from 02/20/25   EKG 12-lead, tracing only     Value    Systolic Blood Pressure     Diastolic Blood Pressure     Ventricular Rate 82    Atrial Rate 82    NY Interval 146    QRS Duration 80        QTc 422    P Axis 56    R AXIS 79    T Axis 44    Interpretation ECG       Sinus rhythm with sinus arrhythmia  Normal ECG  When compared with ECG of 19-Nov-2024 18:09,  No significant change was found       Independent Interpretation   CXR: No pneumothorax, infiltrate, or pleural effusion.    ED Course      Medications Administered   Medications - No data to display    Discussion of Management   None    ED Course   ED Course as of 02/20/25 0409   Thu Feb 20, 2025   0304 I obtained history and examined the patient as noted above.        Additional Documentation  None    Medical Decision Making / Diagnosis     CMS Diagnoses: None    MIPS   None    [facility]   Rayray Contreras is a 29 year old female who presents emergency department with left shoulder and left chest pain that is been worse over the last 24 hours.  Patient works as a  and there certainly might be an exacerbation of her shoulder pain due to repetitive movements.  Her workup here today is very reassuring.  No evidence of an anginal equivalent on her workup.  X-rays were negative.  Recommended symptomatic cares.    Disposition   The patient was discharged.     Diagnosis     ICD-10-CM    1. Acute pain of left shoulder  M25.512       2. Chest pain, unspecified type  R07.9         Scribe Disclosure:  I, Skyler Kemp, am serving as a scribe at 3:12 AM on 2/20/2025 to document services personally performed by Jared Auguste MD based on my observations and the provider's statements to me.        Jared Auguste MD  02/20/25 1071

## 2025-03-01 ENCOUNTER — MYC REFILL (OUTPATIENT)
Dept: FAMILY MEDICINE | Facility: CLINIC | Age: 30
End: 2025-03-01
Payer: COMMERCIAL

## 2025-03-01 DIAGNOSIS — E28.2 PCOS (POLYCYSTIC OVARIAN SYNDROME): ICD-10-CM

## 2025-03-03 RX ORDER — LEVONORGESTREL AND ETHINYL ESTRADIOL 0.15-0.03
1 KIT ORAL DAILY
Qty: 91 TABLET | Refills: 2 | Status: SHIPPED | OUTPATIENT
Start: 2025-03-03

## 2025-05-07 ENCOUNTER — PATIENT OUTREACH (OUTPATIENT)
Dept: CARE COORDINATION | Facility: CLINIC | Age: 30
End: 2025-05-07
Payer: COMMERCIAL

## 2025-05-28 SDOH — HEALTH STABILITY: PHYSICAL HEALTH: ON AVERAGE, HOW MANY DAYS PER WEEK DO YOU ENGAGE IN MODERATE TO STRENUOUS EXERCISE (LIKE A BRISK WALK)?: 5 DAYS

## 2025-05-28 SDOH — HEALTH STABILITY: PHYSICAL HEALTH: ON AVERAGE, HOW MANY MINUTES DO YOU ENGAGE IN EXERCISE AT THIS LEVEL?: 30 MIN

## 2025-05-28 ASSESSMENT — SOCIAL DETERMINANTS OF HEALTH (SDOH): HOW OFTEN DO YOU GET TOGETHER WITH FRIENDS OR RELATIVES?: ONCE A WEEK

## 2025-05-29 ENCOUNTER — PATIENT OUTREACH (OUTPATIENT)
Dept: CARE COORDINATION | Facility: CLINIC | Age: 30
End: 2025-05-29
Payer: COMMERCIAL

## 2025-06-02 ENCOUNTER — RESULTS FOLLOW-UP (OUTPATIENT)
Dept: FAMILY MEDICINE | Facility: CLINIC | Age: 30
End: 2025-06-02

## 2025-06-02 ENCOUNTER — OFFICE VISIT (OUTPATIENT)
Dept: FAMILY MEDICINE | Facility: CLINIC | Age: 30
End: 2025-06-02
Payer: COMMERCIAL

## 2025-06-02 VITALS
BODY MASS INDEX: 38.8 KG/M2 | HEIGHT: 61 IN | TEMPERATURE: 98.4 F | WEIGHT: 205.5 LBS | RESPIRATION RATE: 18 BRPM | DIASTOLIC BLOOD PRESSURE: 84 MMHG | SYSTOLIC BLOOD PRESSURE: 122 MMHG | HEART RATE: 100 BPM | OXYGEN SATURATION: 100 %

## 2025-06-02 DIAGNOSIS — G89.29 CHRONIC LEFT SHOULDER PAIN: ICD-10-CM

## 2025-06-02 DIAGNOSIS — Z12.4 CERVICAL CANCER SCREENING: ICD-10-CM

## 2025-06-02 DIAGNOSIS — M79.641 BILATERAL HAND PAIN: ICD-10-CM

## 2025-06-02 DIAGNOSIS — M79.642 BILATERAL HAND PAIN: ICD-10-CM

## 2025-06-02 DIAGNOSIS — Z13.0 SCREENING FOR DEFICIENCY ANEMIA: ICD-10-CM

## 2025-06-02 DIAGNOSIS — M25.512 CHRONIC LEFT SHOULDER PAIN: ICD-10-CM

## 2025-06-02 DIAGNOSIS — Z00.00 ROUTINE PHYSICAL EXAMINATION: Primary | ICD-10-CM

## 2025-06-02 DIAGNOSIS — F33.1 MODERATE EPISODE OF RECURRENT MAJOR DEPRESSIVE DISORDER (H): ICD-10-CM

## 2025-06-02 DIAGNOSIS — Z13.21 ENCOUNTER FOR VITAMIN DEFICIENCY SCREENING: ICD-10-CM

## 2025-06-02 DIAGNOSIS — D68.00 VON WILLEBRAND'S DISEASE (H): ICD-10-CM

## 2025-06-02 DIAGNOSIS — Z13.1 SCREENING FOR DIABETES MELLITUS: ICD-10-CM

## 2025-06-02 DIAGNOSIS — Z13.220 SCREENING CHOLESTEROL LEVEL: ICD-10-CM

## 2025-06-02 DIAGNOSIS — Z13.29 THYROID DISORDER SCREENING: ICD-10-CM

## 2025-06-02 DIAGNOSIS — R20.0 LEFT ARM NUMBNESS: ICD-10-CM

## 2025-06-02 LAB
ALBUMIN SERPL BCG-MCNC: 4.4 G/DL (ref 3.5–5.2)
ALBUMIN UR-MCNC: NEGATIVE MG/DL
ALP SERPL-CCNC: 57 U/L (ref 40–150)
ALT SERPL W P-5'-P-CCNC: 41 U/L (ref 0–50)
ANION GAP SERPL CALCULATED.3IONS-SCNC: 16 MMOL/L (ref 7–15)
APPEARANCE UR: CLEAR
AST SERPL W P-5'-P-CCNC: 30 U/L (ref 0–45)
BILIRUB SERPL-MCNC: 0.5 MG/DL
BILIRUB UR QL STRIP: NEGATIVE
BUN SERPL-MCNC: 10.4 MG/DL (ref 6–20)
CALCIUM SERPL-MCNC: 9.6 MG/DL (ref 8.8–10.4)
CHLORIDE SERPL-SCNC: 103 MMOL/L (ref 98–107)
CHOLEST SERPL-MCNC: 163 MG/DL
COLOR UR AUTO: YELLOW
CREAT SERPL-MCNC: 0.69 MG/DL (ref 0.51–0.95)
EGFRCR SERPLBLD CKD-EPI 2021: >90 ML/MIN/1.73M2
ERYTHROCYTE [DISTWIDTH] IN BLOOD BY AUTOMATED COUNT: 12.7 % (ref 10–15)
EST. AVERAGE GLUCOSE BLD GHB EST-MCNC: 103 MG/DL
FASTING STATUS PATIENT QL REPORTED: NO
FASTING STATUS PATIENT QL REPORTED: NO
GLUCOSE SERPL-MCNC: 99 MG/DL (ref 70–99)
GLUCOSE UR STRIP-MCNC: NEGATIVE MG/DL
HBA1C MFR BLD: 5.2 % (ref 0–5.6)
HCO3 SERPL-SCNC: 17 MMOL/L (ref 22–29)
HCT VFR BLD AUTO: 44.8 % (ref 35–47)
HDLC SERPL-MCNC: 56 MG/DL
HGB BLD-MCNC: 14.7 G/DL (ref 11.7–15.7)
HGB UR QL STRIP: ABNORMAL
KETONES UR STRIP-MCNC: NEGATIVE MG/DL
LDLC SERPL CALC-MCNC: 96 MG/DL
LEUKOCYTE ESTERASE UR QL STRIP: NEGATIVE
MCH RBC QN AUTO: 31.7 PG (ref 26.5–33)
MCHC RBC AUTO-ENTMCNC: 32.8 G/DL (ref 31.5–36.5)
MCV RBC AUTO: 97 FL (ref 78–100)
NITRATE UR QL: NEGATIVE
NONHDLC SERPL-MCNC: 107 MG/DL
PH UR STRIP: 5.5 [PH] (ref 5–7)
PLATELET # BLD AUTO: 247 10E3/UL (ref 150–450)
POTASSIUM SERPL-SCNC: 4 MMOL/L (ref 3.4–5.3)
PROT SERPL-MCNC: 8.1 G/DL (ref 6.4–8.3)
RBC # BLD AUTO: 4.64 10E6/UL (ref 3.8–5.2)
RBC #/AREA URNS AUTO: ABNORMAL /HPF
SODIUM SERPL-SCNC: 136 MMOL/L (ref 135–145)
SP GR UR STRIP: 1.02 (ref 1–1.03)
SQUAMOUS #/AREA URNS AUTO: ABNORMAL /LPF
TRIGL SERPL-MCNC: 57 MG/DL
TSH SERPL DL<=0.005 MIU/L-ACNC: 1.62 UIU/ML (ref 0.3–4.2)
UROBILINOGEN UR STRIP-ACNC: 0.2 E.U./DL
VIT D+METAB SERPL-MCNC: 15 NG/ML (ref 20–50)
WBC # BLD AUTO: 7.5 10E3/UL (ref 4–11)
WBC #/AREA URNS AUTO: ABNORMAL /HPF

## 2025-06-02 PROCEDURE — 3074F SYST BP LT 130 MM HG: CPT | Performed by: NURSE PRACTITIONER

## 2025-06-02 PROCEDURE — 80053 COMPREHEN METABOLIC PANEL: CPT | Performed by: NURSE PRACTITIONER

## 2025-06-02 PROCEDURE — 83036 HEMOGLOBIN GLYCOSYLATED A1C: CPT | Performed by: NURSE PRACTITIONER

## 2025-06-02 PROCEDURE — 82306 VITAMIN D 25 HYDROXY: CPT | Performed by: NURSE PRACTITIONER

## 2025-06-02 PROCEDURE — G0145 SCR C/V CYTO,THINLAYER,RESCR: HCPCS | Performed by: NURSE PRACTITIONER

## 2025-06-02 PROCEDURE — 3079F DIAST BP 80-89 MM HG: CPT | Performed by: NURSE PRACTITIONER

## 2025-06-02 PROCEDURE — 84443 ASSAY THYROID STIM HORMONE: CPT | Performed by: NURSE PRACTITIONER

## 2025-06-02 PROCEDURE — 36415 COLL VENOUS BLD VENIPUNCTURE: CPT | Performed by: NURSE PRACTITIONER

## 2025-06-02 PROCEDURE — 3044F HG A1C LEVEL LT 7.0%: CPT | Performed by: NURSE PRACTITIONER

## 2025-06-02 PROCEDURE — 87624 HPV HI-RISK TYP POOLED RSLT: CPT | Performed by: NURSE PRACTITIONER

## 2025-06-02 PROCEDURE — 85027 COMPLETE CBC AUTOMATED: CPT | Performed by: NURSE PRACTITIONER

## 2025-06-02 PROCEDURE — 99395 PREV VISIT EST AGE 18-39: CPT | Mod: 25 | Performed by: NURSE PRACTITIONER

## 2025-06-02 PROCEDURE — 80061 LIPID PANEL: CPT | Performed by: NURSE PRACTITIONER

## 2025-06-02 PROCEDURE — 99213 OFFICE O/P EST LOW 20 MIN: CPT | Mod: 25 | Performed by: NURSE PRACTITIONER

## 2025-06-02 PROCEDURE — 81001 URINALYSIS AUTO W/SCOPE: CPT | Performed by: NURSE PRACTITIONER

## 2025-06-02 NOTE — PROGRESS NOTES
"Preventive Care Visit  [facility] [place]  Milana Roman CNP, Nurse Practitioner - Family  Jun 2, 2025      Assessment & Plan     Routine physical examination    Cervical cancer screening  - HPV and Gynecologic Cytology Panel - Recommended Age 30 - 65 Years    Encounter for vitamin deficiency screening  - Vitamin D Deficiency  - Vitamin D Deficiency    Screening cholesterol level  - Lipid panel reflex to direct LDL Non-fasting  - Lipid panel reflex to direct LDL Non-fasting    Screening for diabetes mellitus  - Comprehensive metabolic panel  - Hemoglobin A1c  - Comprehensive metabolic panel  - Hemoglobin A1c    Thyroid disorder screening  - TSH with free T4 reflex  - TSH with free T4 reflex    Screening for deficiency anemia  - CBC with platelets  - CBC with platelets    Moderate episode of recurrent major depressive disorder (H)  Stable off medications.     Von Willebrand's disease (H)  Stable, no recent issues.     Chronic left shoulder pain  PT  - UA Macroscopic with reflex to Microscopic and Culture - Lab Collect  - Physical Therapy  Referral  - UA Macroscopic with reflex to Microscopic and Culture - Lab Collect  - UA Microscopic with Reflex to Culture    Left arm numbness    Bilateral hand pain  - Orthopedic  Referral      Patient has been advised of split billing requirements and indicates understanding: Yes        BMI  Estimated body mass index is 38.83 kg/m  as calculated from the following:    Height as of this encounter: 1.549 m (5' 1\").    Weight as of this encounter: 93.2 kg (205 lb 8 oz).   Weight management plan: Discussed healthy diet and exercise guidelines    Counseling  Appropriate preventive services were addressed with this patient via screening, questionnaire, or discussion as appropriate for fall prevention, nutrition, physical activity, Tobacco-use cessation, social engagement, weight loss and cognition.  Checklist reviewing preventive services available has " been given to the patient.  Reviewed patient's diet, addressing concerns and/or questions.   The patient was instructed to see the dentist every 6 months.   She is at risk for psychosocial distress and has been provided with information to reduce risk.           Molina Seo is a 30 year old, presenting for the following:  Physical        6/2/2025     6:48 AM   Additional Questions   Roomed by Miladys HA   Accompanied by Self          HPI    Left shoulder pain, worked up at ED. Feels mostly after work.     Also having bilateral hand numbness for some time.   Has occasionally braced but does not tolerate them well.      Advance Care Planning            5/28/2025   General Health   How would you rate your overall physical health? (!) FAIR   Feel stress (tense, anxious, or unable to sleep) To some extent   (!) STRESS CONCERN      5/28/2025   Nutrition   Three or more servings of calcium each day? (!) I DON'T KNOW   Diet: Regular (no restrictions)   How many servings of fruit and vegetables per day? (!) 0-1   How many sweetened beverages each day? 0-1         5/28/2025   Exercise   Days per week of moderate/strenous exercise 5 days   Average minutes spent exercising at this level 30 min         5/28/2025   Social Factors   Frequency of gathering with friends or relatives Once a week   Worry food won't last until get money to buy more No   Food not last or not have enough money for food? No   Do you have housing? (Housing is defined as stable permanent housing and does not include staying outside in a car, in a tent, in an abandoned building, in an overnight shelter, or couch-surfing.) No   Are you worried about losing your housing? No   Lack of transportation? No   Unable to get utilities (heat,electricity)? No   Want help with housing or utility concern? No   (!) HOUSING CONCERN PRESENT      5/28/2025   Dental   Dentist two times every year? (!) NO       Today's PHQ-9 Score:       6/2/2025     6:32 AM   PHQ-9  SCORE   PHQ-9 Total Score MyChart 3 (Minimal depression)   PHQ-9 Total Score 3        Patient-reported         5/28/2025   Substance Use   Alcohol more than 3/day or more than 7/wk No   Do you use any other substances recreationally? No     Social History     Tobacco Use    Smoking status: Never    Smokeless tobacco: Never   Vaping Use    Vaping status: Never Used   Substance Use Topics    Alcohol use: No     Comment: once every few months     Drug use: No           11/29/2023   LAST FHS-7 RESULTS   1st degree relative breast or ovarian cancer No   Any relative bilateral breast cancer No   Any male have breast cancer No   Any ONE woman have BOTH breast AND ovarian cancer No   Any woman with breast cancer before 50yrs No   2 or more relatives with breast AND/OR ovarian cancer No   2 or more relatives with breast AND/OR bowel cancer No        Mammogram Screening - Patient under 40 years of age: Routine Mammogram Screening not recommended.         5/28/2025   STI Screening   New sexual partner(s) since last STI/HIV test? No     History of abnormal Pap smear: No - age 30-64 HPV with reflex Pap every 5 years recommended        10/3/2022     9:51 AM 5/15/2018     9:47 AM 10/13/2015    12:00 AM   PAP / HPV   PAP Negative for Intraepithelial Lesion or Malignancy (NILM)      PAP (Historical)  NIL  NIL            5/28/2025   Contraception/Family Planning   Questions about contraception or family planning No        Reviewed and updated as needed this visit by Provider                    Past Medical History:   Diagnosis Date    Concussion without loss of consciousness 9/1/2015    Depressive disorder 7/15/2010    Developmental delay     Muscle cramp 5/14/2009    Has botox injection in gastrocnemius.    PCOS (polycystic ovarian syndrome) 7/15/2010     Past Surgical History:   Procedure Laterality Date    NO HISTORY OF SURGERY           Review of Systems  Constitutional, HEENT, cardiovascular, pulmonary, GI, , musculoskeletal,  "neuro, skin, endocrine and psych systems are negative, except as otherwise noted.     Objective    Exam  There were no vitals taken for this visit.   Estimated body mass index is 39.06 kg/m  as calculated from the following:    Height as of 6/27/24: 1.543 m (5' 0.75\").    Weight as of 2/20/25: 93 kg (205 lb 0.4 oz).    Physical Exam  GENERAL: alert and no distress  EYES: Eyes grossly normal to inspection, PERRL and conjunctivae and sclerae normal  HENT: ear canals and TM's normal, nose and mouth without ulcers or lesions  NECK: no adenopathy, no asymmetry, masses, or scars  RESP: lungs clear to auscultation - no rales, rhonchi or wheezes  BREAST: normal without masses, tenderness or nipple discharge and no palpable axillary masses or adenopathy  CV: regular rate and rhythm, normal S1 S2, no S3 or S4, no murmur, click or rub, no peripheral edema  ABDOMEN: soft, nontender, no hepatosplenomegaly, no masses and bowel sounds normal   (female) w/bimanual: normal female external genitalia, normal urethral meatus, normal vaginal mucosa, and normal cervix/adnexa/uterus without masses or discharge  MS: no gross musculoskeletal defects noted, no edema  SKIN: no suspicious lesions or rashes  NEURO: Normal strength and tone, mentation intact and speech normal  PSYCH: mentation appears normal, affect normal/bright        Signed Electronically by: Milana Roman CNP    Answers submitted by the patient for this visit:  Patient Health Questionnaire (Submitted on 6/2/2025)  If you checked off any problems, how difficult have these problems made it for you to do your work, take care of things at home, or get along with other people?: Somewhat difficult  PHQ9 TOTAL SCORE: 3    "

## 2025-06-03 ENCOUNTER — PATIENT OUTREACH (OUTPATIENT)
Dept: CARE COORDINATION | Facility: CLINIC | Age: 30
End: 2025-06-03
Payer: COMMERCIAL

## 2025-06-03 LAB
HPV HR 12 DNA CVX QL NAA+PROBE: NEGATIVE
HPV16 DNA CVX QL NAA+PROBE: NEGATIVE
HPV18 DNA CVX QL NAA+PROBE: NEGATIVE
HUMAN PAPILLOMA VIRUS FINAL DIAGNOSIS: NORMAL

## 2025-06-04 LAB
BKR AP ASSOCIATED HPV REPORT: NORMAL
BKR LAB AP GYN ADEQUACY: NORMAL
BKR LAB AP GYN INTERPRETATION: NORMAL
BKR LAB AP LMP: NORMAL
BKR LAB AP PREVIOUS ABNORMAL: NORMAL
PATH REPORT.COMMENTS IMP SPEC: NORMAL
PATH REPORT.COMMENTS IMP SPEC: NORMAL
PATH REPORT.RELEVANT HX SPEC: NORMAL

## 2025-06-05 ENCOUNTER — PATIENT OUTREACH (OUTPATIENT)
Dept: CARE COORDINATION | Facility: CLINIC | Age: 30
End: 2025-06-05
Payer: COMMERCIAL

## 2025-06-21 ENCOUNTER — NURSE TRIAGE (OUTPATIENT)
Dept: FAMILY MEDICINE | Facility: CLINIC | Age: 30
End: 2025-06-21
Payer: COMMERCIAL

## 2025-06-21 DIAGNOSIS — M54.50 LOW BACK PAIN POTENTIALLY ASSOCIATED WITH RADICULOPATHY: Primary | ICD-10-CM

## 2025-06-23 NOTE — TELEPHONE ENCOUNTER
Are you aware of left leg pain & right ankle pain - pt wants to be see physical therapy for this.    See [employer] message below

## 2025-06-26 NOTE — TELEPHONE ENCOUNTER
Nurse Triage SBAR    Is this a 2nd Level Triage? NO    Situation: Called Pt about MyChart - back and leg pain    Background: Pt had herniated disc in 2020     Assessment: Since herniated disc in 2020 Pt has moderate pain in left buttock and down back of thigh. She states she has discussed with PCP in the past but not this last appointment, she just states this is more of a concern than the shoulder pain that PT was ordered for. She also states she can't go to PT because of her schedule. Pt also complains of some right ankle pain she is unsure if this is related to back issue as well. 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   Protocol Recommended Disposition:   See in Office Within 3 Days - Pt would prefer to see ortho spine, writer advise that PCP may ask for her to be seen in clinic about this first.     Recommendation: Pt requesting ortho spine consult, please advise.     Routed to provider    Does the patient meet one of the following criteria for ADS visit consideration? 16+ years old, with an [facility] PCP     TIP  Providers, please consider if this condition is appropriate for management at one of our Acute and Diagnostic Services sites.     If patient is a good candidate, please use dotphrase <dot>triageresponse and select Refer to ADS to document.   Reason for Disposition   MODERATE back pain (e.g., interferes with normal activities) and present > 3 days     Pt wants a referral to spine.    Additional Information   Negative: Passed out (e.g., fainted, lost consciousness, blacked out and was not responding)   Negative: Shock suspected (e.g., cold/pale/clammy skin, too weak to stand, low BP, rapid pulse)   Negative: Sounds like a life-threatening emergency to the triager   Negative: Major injury to the back (e.g., MVA, fall > 10 feet or 3 meters, penetrating injury, etc.)   Negative: Pain in the upper back over the ribs (rib cage) that radiates (travels)  "into the chest   Negative: Pain in the upper back over the ribs (rib cage) and worsened by coughing (or clearly increases with breathing)   Negative: Back pain during pregnancy   Negative: SEVERE back pain of sudden onset and age > 60 years   Negative: SEVERE abdominal pain (e.g., excruciating)   Negative: Abdominal pain and age > 60 years   Negative: Unable to urinate (or only a few drops) and bladder feels very full   Negative: Loss of bladder or bowel control (urine or bowel incontinence; wetting self, leaking stool) of new-onset   Negative: Numbness (loss of sensation) in groin or rectal area   Negative: Pain radiates into groin, scrotum   Negative: Blood in urine (red, pink, or tea-colored)   Negative: Vomiting and pain over lower ribs of back (i.e., flank - kidney area)   Negative: Weakness of a leg or foot (e.g., unable to bear weight, dragging foot)   Negative: Patient sounds very sick or weak to the triager   Negative: Fever > 100.4 F (38.0 C) and flank pain   Negative: Pain or burning with passing urine (urination)   Negative: SEVERE back pain (e.g., excruciating, unable to do any normal activities) and not improved after pain medicine and CARE ADVICE   Negative: Numbness in an arm or hand (i.e., loss of sensation) and upper back pain   Negative: Numbness in a leg or foot (i.e., loss of sensation)   Negative: High-risk adult (e.g., history of cancer, history of HIV, or history of IV Drug Use)   Negative: Soft tissue infection (e.g., abscess, cellulitis) or other serious infection (e.g., bacteremia) in last 2 weeks   Negative: Painful rash with multiple small blisters grouped together (i.e., dermatomal distribution or 'band' or 'stripe')   Negative: Pain radiates into the thigh or further down the leg, and in both legs   Negative: Age > 50 and no history of prior similar back pain    Answer Assessment - Initial Assessment Questions  1. ONSET: \"When did the pain begin?\" (e.g., minutes, hours, days)      " "2020  2. LOCATION: \"Where does it hurt?\" (upper, mid or lower back)      Sciatic nerve pain, left   3. SEVERITY: \"How bad is the pain?\"  (e.g., Scale 1-10; mild, moderate, or severe)      Moderate  4. PATTERN: \"Is the pain constant?\" (e.g., yes, no; constant, intermittent)       Comes and goes  5. RADIATION: \"Does the pain shoot into your legs or somewhere else?\"      Yes into left leg, also has right ankle pain  6. CAUSE:  \"What do you think is causing the back pain?\"       Issue from herniated disc  7. 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   8. MEDICINES: \"What have you taken so far for the pain?\" (e.g., nothing, acetaminophen, NSAIDS)      No answer  9. NEUROLOGIC SYMPTOMS: \"Do you have any weakness, numbness, or problems with bowel/bladder control?\"      No  10. OTHER SYMPTOMS: \"Do you have any other symptoms?\" (e.g., fever, abdomen pain, burning with urination, blood in urine)        No  11. PREGNANCY: \"Is there any chance you are pregnant?\" \"When was your last menstrual period?\"        No answer    Protocols used: Back Pain-A-[place]    "

## 2025-06-26 NOTE — TELEPHONE ENCOUNTER
Spoke with pt and advised of information below. Will send number via [employer] per pt request.    Sophie ENRIQUE, RN, PHN

## 2025-06-26 NOTE — TELEPHONE ENCOUNTER
We have talked about carpal tunnel this would be orthopedic hand referral. Not sure if we discussed leg. Please have nurse triage symptoms.    Milana Roman, CNP

## 2025-06-26 NOTE — TELEPHONE ENCOUNTER
Attempt # 1    Called # [phone]     Left a non detailed VM to call back at [phone] and ask for any available Triage Nurse.    # for referral [phone]    Miladys Mallory RN on 6/26/2025 at 11:31 AM   [facility]

## 2025-06-30 ENCOUNTER — PATIENT OUTREACH (OUTPATIENT)
Dept: CARE COORDINATION | Facility: CLINIC | Age: 30
End: 2025-06-30
Payer: COMMERCIAL

## 2025-07-30 SDOH — HEALTH STABILITY: PHYSICAL HEALTH: ON AVERAGE, HOW MANY MINUTES DO YOU ENGAGE IN EXERCISE AT THIS LEVEL?: 30 MIN

## 2025-07-30 SDOH — HEALTH STABILITY: PHYSICAL HEALTH: ON AVERAGE, HOW MANY DAYS PER WEEK DO YOU ENGAGE IN MODERATE TO STRENUOUS EXERCISE (LIKE A BRISK WALK)?: 5 DAYS

## 2025-08-04 ENCOUNTER — OFFICE VISIT (OUTPATIENT)
Dept: ORTHOPEDICS | Facility: CLINIC | Age: 30
End: 2025-08-04
Attending: NURSE PRACTITIONER
Payer: COMMERCIAL

## 2025-08-04 ENCOUNTER — ANCILLARY PROCEDURE (OUTPATIENT)
Dept: GENERAL RADIOLOGY | Facility: CLINIC | Age: 30
End: 2025-08-04
Attending: STUDENT IN AN ORGANIZED HEALTH CARE EDUCATION/TRAINING PROGRAM
Payer: COMMERCIAL

## 2025-08-04 VITALS
HEART RATE: 68 BPM | BODY MASS INDEX: 38.71 KG/M2 | DIASTOLIC BLOOD PRESSURE: 97 MMHG | SYSTOLIC BLOOD PRESSURE: 150 MMHG | HEIGHT: 61 IN | WEIGHT: 205 LBS | OXYGEN SATURATION: 100 %

## 2025-08-04 DIAGNOSIS — M51.370 DEGENERATION OF INTERVERTEBRAL DISC OF LUMBOSACRAL REGION WITH DISCOGENIC BACK PAIN: ICD-10-CM

## 2025-08-04 DIAGNOSIS — M79.642 BILATERAL HAND PAIN: Primary | ICD-10-CM

## 2025-08-04 DIAGNOSIS — M79.641 BILATERAL HAND PAIN: Primary | ICD-10-CM

## 2025-08-04 DIAGNOSIS — R20.2 NUMBNESS AND TINGLING IN BOTH HANDS: ICD-10-CM

## 2025-08-04 DIAGNOSIS — M51.24 THORACIC DISC HERNIATION: ICD-10-CM

## 2025-08-04 DIAGNOSIS — M79.641 BILATERAL HAND PAIN: ICD-10-CM

## 2025-08-04 DIAGNOSIS — M54.50 LOW BACK PAIN POTENTIALLY ASSOCIATED WITH RADICULOPATHY: ICD-10-CM

## 2025-08-04 DIAGNOSIS — R20.0 NUMBNESS AND TINGLING IN BOTH HANDS: ICD-10-CM

## 2025-08-04 DIAGNOSIS — M79.18 MYOFASCIAL PAIN SYNDROME OF THORACIC SPINE: ICD-10-CM

## 2025-08-04 DIAGNOSIS — M79.642 BILATERAL HAND PAIN: ICD-10-CM

## 2025-08-04 DIAGNOSIS — M51.34 THORACIC DEGENERATIVE DISC DISEASE: ICD-10-CM

## 2025-08-04 DIAGNOSIS — M54.17 LUMBOSACRAL RADICULOPATHY: ICD-10-CM

## 2025-08-04 DIAGNOSIS — M51.27 LUMBOSACRAL DISC HERNIATION: ICD-10-CM

## 2025-08-04 PROCEDURE — 99245 OFF/OP CONSLTJ NEW/EST HI 55: CPT | Performed by: PHYSICAL MEDICINE & REHABILITATION

## 2025-08-04 PROCEDURE — 1125F AMNT PAIN NOTED PAIN PRSNT: CPT | Performed by: PHYSICAL MEDICINE & REHABILITATION

## 2025-08-04 PROCEDURE — 3077F SYST BP >= 140 MM HG: CPT | Performed by: PHYSICAL MEDICINE & REHABILITATION

## 2025-08-04 PROCEDURE — 3080F DIAST BP >= 90 MM HG: CPT | Performed by: PHYSICAL MEDICINE & REHABILITATION

## 2025-08-04 PROCEDURE — 99213 OFFICE O/P EST LOW 20 MIN: CPT | Performed by: STUDENT IN AN ORGANIZED HEALTH CARE EDUCATION/TRAINING PROGRAM

## 2025-08-04 PROCEDURE — G2211 COMPLEX E/M VISIT ADD ON: HCPCS | Performed by: STUDENT IN AN ORGANIZED HEALTH CARE EDUCATION/TRAINING PROGRAM

## 2025-08-04 ASSESSMENT — PAIN SCALES - GENERAL: PAINLEVEL_OUTOF10: SEVERE PAIN (7)

## 2025-08-16 ENCOUNTER — HOSPITAL ENCOUNTER (OUTPATIENT)
Dept: MRI IMAGING | Facility: CLINIC | Age: 30
Discharge: HOME | End: 2025-08-16
Attending: PHYSICAL MEDICINE & REHABILITATION | Admitting: PHYSICAL MEDICINE & REHABILITATION
Payer: COMMERCIAL

## 2025-08-16 DIAGNOSIS — M51.34 THORACIC DEGENERATIVE DISC DISEASE: ICD-10-CM

## 2025-08-16 DIAGNOSIS — M51.24 THORACIC DISC HERNIATION: ICD-10-CM

## 2025-08-16 DIAGNOSIS — M51.27 LUMBOSACRAL DISC HERNIATION: ICD-10-CM

## 2025-08-16 DIAGNOSIS — M51.370 DEGENERATION OF INTERVERTEBRAL DISC OF LUMBOSACRAL REGION WITH DISCOGENIC BACK PAIN: ICD-10-CM

## 2025-08-16 PROCEDURE — 72148 MRI LUMBAR SPINE W/O DYE: CPT

## 2025-08-16 PROCEDURE — 72146 MRI CHEST SPINE W/O DYE: CPT
